# Patient Record
Sex: MALE | Race: BLACK OR AFRICAN AMERICAN | Employment: UNEMPLOYED | URBAN - METROPOLITAN AREA
[De-identification: names, ages, dates, MRNs, and addresses within clinical notes are randomized per-mention and may not be internally consistent; named-entity substitution may affect disease eponyms.]

---

## 2017-01-27 ENCOUNTER — OFFICE VISIT (OUTPATIENT)
Dept: FAMILY MEDICINE CLINIC | Age: 67
End: 2017-01-27

## 2017-01-27 VITALS
BODY MASS INDEX: 31.82 KG/M2 | DIASTOLIC BLOOD PRESSURE: 95 MMHG | HEART RATE: 72 BPM | TEMPERATURE: 97.1 F | SYSTOLIC BLOOD PRESSURE: 157 MMHG | WEIGHT: 191 LBS | RESPIRATION RATE: 18 BRPM | OXYGEN SATURATION: 97 % | HEIGHT: 65 IN

## 2017-01-27 DIAGNOSIS — R04.0 EPISTAXIS: ICD-10-CM

## 2017-01-27 DIAGNOSIS — R39.9 LOWER URINARY TRACT SYMPTOMS (LUTS): ICD-10-CM

## 2017-01-27 DIAGNOSIS — Z11.59 NEED FOR HEPATITIS C SCREENING TEST: ICD-10-CM

## 2017-01-27 DIAGNOSIS — R51.9 HEADACHE, UNSPECIFIED HEADACHE TYPE: ICD-10-CM

## 2017-01-27 DIAGNOSIS — F39 MOOD DISORDER (HCC): ICD-10-CM

## 2017-01-27 DIAGNOSIS — I10 ESSENTIAL HYPERTENSION: Primary | ICD-10-CM

## 2017-01-27 DIAGNOSIS — Z12.5 SCREENING FOR PROSTATE CANCER: ICD-10-CM

## 2017-01-27 RX ORDER — SERTRALINE HYDROCHLORIDE 50 MG/1
75 TABLET, FILM COATED ORAL DAILY
Qty: 135 TAB | Refills: 3 | Status: SHIPPED | OUTPATIENT
Start: 2017-01-27 | End: 2017-02-20 | Stop reason: SDUPTHER

## 2017-01-27 RX ORDER — SERTRALINE HYDROCHLORIDE 50 MG/1
TABLET, FILM COATED ORAL
Refills: 5 | COMMUNITY
Start: 2016-12-23 | End: 2017-01-27 | Stop reason: SDUPTHER

## 2017-01-27 RX ORDER — LOSARTAN POTASSIUM 50 MG/1
TABLET ORAL
Refills: 1 | COMMUNITY
Start: 2016-12-20 | End: 2017-01-27

## 2017-01-27 RX ORDER — PNEUMOCOCCAL 13-VALENT CONJUGATE VACCINE 2.2; 2.2; 2.2; 2.2; 2.2; 4.4; 2.2; 2.2; 2.2; 2.2; 2.2; 2.2; 2.2 UG/.5ML; UG/.5ML; UG/.5ML; UG/.5ML; UG/.5ML; UG/.5ML; UG/.5ML; UG/.5ML; UG/.5ML; UG/.5ML; UG/.5ML; UG/.5ML; UG/.5ML
INJECTION, SUSPENSION INTRAMUSCULAR
Refills: 0 | COMMUNITY
Start: 2017-01-09 | End: 2017-06-09

## 2017-01-27 RX ORDER — LOSARTAN POTASSIUM 100 MG/1
100 TABLET ORAL DAILY
Qty: 90 TAB | Refills: 1 | Status: SHIPPED | OUTPATIENT
Start: 2017-01-27 | End: 2017-08-30 | Stop reason: SDUPTHER

## 2017-01-27 RX ORDER — BUTALBITAL, ACETAMINOPHEN, CAFFEINE AND CODEINE PHOSPHATE 50; 325; 40; 30 MG/1; MG/1; MG/1; MG/1
1 CAPSULE ORAL
Qty: 30 CAP | Refills: 0 | Status: SHIPPED | OUTPATIENT
Start: 2017-01-27 | End: 2017-02-20

## 2017-01-27 RX ORDER — AMLODIPINE BESYLATE 10 MG/1
TABLET ORAL
Refills: 2 | COMMUNITY
Start: 2016-12-20 | End: 2017-10-25 | Stop reason: SDUPTHER

## 2017-01-27 NOTE — PATIENT INSTRUCTIONS
DASH Diet: Care Instructions  Your Care Instructions  The DASH diet is an eating plan that can help lower your blood pressure. DASH stands for Dietary Approaches to Stop Hypertension. Hypertension is high blood pressure. The DASH diet focuses on eating foods that are high in calcium, potassium, and magnesium. These nutrients can lower blood pressure. The foods that are highest in these nutrients are fruits, vegetables, low-fat dairy products, nuts, seeds, and legumes. But taking calcium, potassium, and magnesium supplements instead of eating foods that are high in those nutrients does not have the same effect. The DASH diet also includes whole grains, fish, and poultry. The DASH diet is one of several lifestyle changes your doctor may recommend to lower your high blood pressure. Your doctor may also want you to decrease the amount of sodium in your diet. Lowering sodium while following the DASH diet can lower blood pressure even further than just the DASH diet alone. Follow-up care is a key part of your treatment and safety. Be sure to make and go to all appointments, and call your doctor if you are having problems. It's also a good idea to know your test results and keep a list of the medicines you take. How can you care for yourself at home? Following the DASH diet  · Eat 4 to 5 servings of fruit each day. A serving is 1 medium-sized piece of fruit, ½ cup chopped or canned fruit, 1/4 cup dried fruit, or 4 ounces (½ cup) of fruit juice. Choose fruit more often than fruit juice. · Eat 4 to 5 servings of vegetables each day. A serving is 1 cup of lettuce or raw leafy vegetables, ½ cup of chopped or cooked vegetables, or 4 ounces (½ cup) of vegetable juice. Choose vegetables more often than vegetable juice. · Get 2 to 3 servings of low-fat and fat-free dairy each day. A serving is 8 ounces of milk, 1 cup of yogurt, or 1 ½ ounces of cheese. · Eat 6 to 8 servings of grains each day.  A serving is 1 slice of bread, 1 ounce of dry cereal, or ½ cup of cooked rice, pasta, or cooked cereal. Try to choose whole-grain products as much as possible. · Limit lean meat, poultry, and fish to 2 servings each day. A serving is 3 ounces, about the size of a deck of cards. · Eat 4 to 5 servings of nuts, seeds, and legumes (cooked dried beans, lentils, and split peas) each week. A serving is 1/3 cup of nuts, 2 tablespoons of seeds, or ½ cup of cooked beans or peas. · Limit fats and oils to 2 to 3 servings each day. A serving is 1 teaspoon of vegetable oil or 2 tablespoons of salad dressing. · Limit sweets and added sugars to 5 servings or less a week. A serving is 1 tablespoon jelly or jam, ½ cup sorbet, or 1 cup of lemonade. · Eat less than 2,300 milligrams (mg) of sodium a day. If you limit your sodium to 1,500 mg a day, you can lower your blood pressure even more. Tips for success  · Start small. Do not try to make dramatic changes to your diet all at once. You might feel that you are missing out on your favorite foods and then be more likely to not follow the plan. Make small changes, and stick with them. Once those changes become habit, add a few more changes. · Try some of the following:  ¨ Make it a goal to eat a fruit or vegetable at every meal and at snacks. This will make it easy to get the recommended amount of fruits and vegetables each day. ¨ Try yogurt topped with fruit and nuts for a snack or healthy dessert. ¨ Add lettuce, tomato, cucumber, and onion to sandwiches. ¨ Combine a ready-made pizza crust with low-fat mozzarella cheese and lots of vegetable toppings. Try using tomatoes, squash, spinach, broccoli, carrots, cauliflower, and onions. ¨ Have a variety of cut-up vegetables with a low-fat dip as an appetizer instead of chips and dip. ¨ Sprinkle sunflower seeds or chopped almonds over salads. Or try adding chopped walnuts or almonds to cooked vegetables. ¨ Try some vegetarian meals using beans and peas. Add garbanzo or kidney beans to salads. Make burritos and tacos with mashed ambrocio beans or black beans. Where can you learn more? Go to http://lavonne-frances.info/. Enter F474 in the search box to learn more about \"DASH Diet: Care Instructions. \"  Current as of: March 23, 2016  Content Version: 11.1  © 3856-2704 [employer]. Care instructions adapted under license by [employer] (which disclaims liability or warranty for this information). If you have questions about a medical condition or this instruction, always ask your healthcare professional. [place] [address] any warranty or liability for your use of this information. Headache: Care Instructions  Your Care Instructions    Headaches have many possible causes. Most headaches aren't a sign of a more serious problem, and they will get better on their own. Home treatment may help you feel better faster. The doctor has checked you carefully, but problems can develop later. If you notice any problems or new symptoms, get medical treatment right away. Follow-up care is a key part of your treatment and safety. Be sure to make and go to all appointments, and call your doctor if you are having problems. It's also a good idea to know your test results and keep a list of the medicines you take. How can you care for yourself at home? · Do not drive if you have taken a prescription pain medicine. · Rest in a quiet, dark room until your headache is gone. Close your eyes and try to relax or go to sleep. Don't watch TV or read. · Put a cold, moist cloth or cold pack on the painful area for 10 to 20 minutes at a time. Put a thin cloth between the cold pack and your skin. · Use a warm, moist towel or a heating pad set on low to relax tight shoulder and neck muscles. · Have someone gently massage your neck and shoulders. · Take pain medicines exactly as directed.   ¨ If the doctor gave you a prescription medicine for pain, take it as prescribed. ¨ If you are not taking a prescription pain medicine, ask your doctor if you can take an over-the-counter medicine. · Be careful not to take pain medicine more often than the instructions allow, because you may get worse or more frequent headaches when the medicine wears off. · Do not ignore new symptoms that occur with a headache, such as a fever, weakness or numbness, vision changes, or confusion. These may be signs of a more serious problem. To prevent headaches  · Keep a headache diary so you can figure out what triggers your headaches. Avoiding triggers may help you prevent headaches. Record when each headache began, how long it lasted, and what the pain was like (throbbing, aching, stabbing, or dull). Write down any other symptoms you had with the headache, such as nausea, flashing lights or dark spots, or sensitivity to bright light or loud noise. Note if the headache occurred near your period. List anything that might have triggered the headache, such as certain foods (chocolate, cheese, wine) or odors, smoke, bright light, stress, or lack of sleep. · Find healthy ways to deal with stress. Headaches are most common during or right after stressful times. Take time to relax before and after you do something that has caused a headache in the past.  · Try to keep your muscles relaxed by keeping good posture. Check your jaw, face, neck, and shoulder muscles for tension, and try relaxing them. When sitting at a desk, change positions often, and stretch for 30 seconds each hour. · Get plenty of sleep and exercise. · Eat regularly and well. Long periods without food can trigger a headache. · Treat yourself to a massage. Some people find that regular massages are very helpful in relieving tension. · Limit caffeine by not drinking too much coffee, tea, or soda. But don't quit caffeine suddenly, because that can also give you headaches.   · Reduce eyestrain from computers by blinking frequently and looking away from the computer screen every so often. Make sure you have proper eyewear and that your monitor is set up properly, about an arm's length away. · Seek help if you have depression or anxiety. Your headaches may be linked to these conditions. Treatment can both prevent headaches and help with symptoms of anxiety or depression. When should you call for help? Call 911 anytime you think you may need emergency care. For example, call if:  · You have signs of a stroke. These may include:  ¨ Sudden numbness, paralysis, or weakness in your face, arm, or leg, especially on only one side of your body. ¨ Sudden vision changes. ¨ Sudden trouble speaking. ¨ Sudden confusion or trouble understanding simple statements. ¨ Sudden problems with walking or balance. ¨ A sudden, severe headache that is different from past headaches. Call your doctor now or seek immediate medical care if:  · You have a new or worse headache. · Your headache gets much worse. Where can you learn more? Go to http://lavonne-frances.info/. Enter M271 in the search box to learn more about \"Headache: Care Instructions. \"  Current as of: February 19, 2016  Content Version: 11.1  © 6384-0175 [employer]. Care instructions adapted under license by [employer] (which disclaims liability or warranty for this information). If you have questions about a medical condition or this instruction, always ask your healthcare professional. [place] [address] any warranty or liability for your use of this information.
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
## 2017-01-27 NOTE — MR AVS SNAPSHOT
Visit Information Date & Time Provider Department Dept. Phone Encounter #  
 1/27/2017  9:30 AM Garethed Raymon Quevedo MD [facility] [phone] [id number] Follow-up Instructions Return in about 2 weeks (around 2/10/2017), or if symptoms worsen or fail to improve, for htn, luts. Upcoming Health Maintenance Date Due Hepatitis C Screening 1950 DTaP/Tdap/Td series (1 - Tdap) 12/12/1970 FOBT Q 1 YEAR AGE 50-75 12/12/1999 ZOSTER VACCINE AGE 60> 12/12/2009 GLAUCOMA SCREENING Q2Y 12/12/2014 MEDICARE YEARLY EXAM 12/12/2014 Pneumococcal 65+ Low/Medium Risk (2 of 2 - PPSV23) 1/19/2018 Allergies as of 1/27/2017  Review Complete On: 1/27/2017 By: Migel Pruett MD  
 No Known Allergies Current Immunizations  Never Reviewed No immunizations on file. Not reviewed this visit You Were Diagnosed With   
  
 Codes Comments Essential hypertension    -  Primary ICD-10-CM: I10 
ICD-9-CM: 401.9 Lower urinary tract symptoms (LUTS)     ICD-10-CM: R39.9 ICD-9-CM: 788.99 Headache, unspecified headache type     ICD-10-CM: R51 ICD-9-CM: 784.0 Mood disorder ([facility] [address]     ICD-10-CM: F39 
ICD-9-CM: 296.90 Screening for prostate cancer     ICD-10-CM: Z12.5 ICD-9-CM: V76.44 Need for hepatitis C screening test     ICD-10-CM: Z11.59 
ICD-9-CM: V73.89 Vitals BP Pulse Temp Resp Height(growth percentile) Weight(growth percentile) (!) 157/95 (BP 1 Location: Left arm, BP Patient Position: Sitting) 72 97.1 °F (36.2 °C) (Oral) 18 5' 5\" (1.651 m) 191 lb (86.6 kg) SpO2 BMI Smoking Status 97% 31.78 kg/m2 Never Smoker BMI and BSA Data Body Mass Index Body Surface Area 31.78 kg/m 2 1.99 m 2 Preferred Pharmacy Pharmacy Name Phone [facility], [address] [place] [address] [phone] Your Updated Medication List  
  
   
 This list is accurate as of: 1/27/17 10:48 AM.  Always use your most recent med list. amLODIPine 10 mg tablet Commonly known as:  Tad Mendel TK 1 T PO D FOR BP  
  
 codeine-butalbital-acetaminophen-caffeine -83-30 mg per capsule Commonly known as:  FIORICET WITH CODEINE Take 1 Cap by mouth every six (6) hours as needed for Headache. Max Daily Amount: 4 Caps. FLUAD 5533-2592 (65 YR UP)(PF) Syrg injection Generic drug:  influenza vaccine 2016-17 (65 yr+)(PF)  
ADM 0.5ML IM UTD  
  
 losartan 100 mg tablet Commonly known as:  COZAAR Take 1 Tab by mouth daily. Indications: Hypertension PREVNAR 13 (PF) 0.5 mL Syrg injection Generic drug:  pneumococcal 13 jim conj dip ADM 0.5ML IM UTD  
  
 sertraline 50 mg tablet Commonly known as:  ZOLOFT Take 1.5 Tabs by mouth daily. Prescriptions Printed Refills  
 codeine-butalbital-acetaminophen-caffeine (FIORICET WITH CODEINE) -12-30 mg per capsule 0 Sig: Take 1 Cap by mouth every six (6) hours as needed for Headache. Max Daily Amount: 4 Caps. Class: Print Route: Oral  
  
Prescriptions Sent to Pharmacy Refills  
 sertraline (ZOLOFT) 50 mg tablet 3 Sig: Take 1.5 Tabs by mouth daily. Class: Normal  
 Pharmacy: [address] Ph #: [phone] Route: Oral  
 losartan (COZAAR) 100 mg tablet 1 Sig: Take 1 Tab by mouth daily. Indications: Hypertension Class: Normal  
 Pharmacy: [address] Ph #: [phone] Route: Oral  
  
Follow-up Instructions Return in about 2 weeks (around 2/10/2017), or if symptoms worsen or fail to improve, for htn, luts. To-Do List   
 01/27/2017 Lab:  CBC WITH AUTOMATED DIFF   
  
 01/27/2017 Lab:  HEPATITIS C AB   
  
 01/27/2017 Lab:  LIPID PANEL   
  
 01/27/2017 Lab: METABOLIC PANEL, COMPREHENSIVE   
  
 01/27/2017 Lab:  PSA SCREENING (SCREENING) Patient Instructions DASH Diet: Care Instructions Your Care Instructions The DASH diet is an eating plan that can help lower your blood pressure. DASH stands for Dietary Approaches to Stop Hypertension. Hypertension is high blood pressure. The DASH diet focuses on eating foods that are high in calcium, potassium, and magnesium. These nutrients can lower blood pressure. The foods that are highest in these nutrients are fruits, vegetables, low-fat dairy products, nuts, seeds, and legumes. But taking calcium, potassium, and magnesium supplements instead of eating foods that are high in those nutrients does not have the same effect. The DASH diet also includes whole grains, fish, and poultry. The DASH diet is one of several lifestyle changes your doctor may recommend to lower your high blood pressure. Your doctor may also want you to decrease the amount of sodium in your diet. Lowering sodium while following the DASH diet can lower blood pressure even further than just the DASH diet alone. Follow-up care is a key part of your treatment and safety. Be sure to make and go to all appointments, and call your doctor if you are having problems. It's also a good idea to know your test results and keep a list of the medicines you take. How can you care for yourself at home? Following the DASH diet · Eat 4 to 5 servings of fruit each day. A serving is 1 medium-sized piece of fruit, ½ cup chopped or canned fruit, 1/4 cup dried fruit, or 4 ounces (½ cup) of fruit juice. Choose fruit more often than fruit juice. · Eat 4 to 5 servings of vegetables each day. A serving is 1 cup of lettuce or raw leafy vegetables, ½ cup of chopped or cooked vegetables, or 4 ounces (½ cup) of vegetable juice. Choose vegetables more often than vegetable juice. · Get 2 to 3 servings of low-fat and fat-free dairy each day.  A serving is 8 ounces of milk, 1 cup of yogurt, or 1 ½ ounces of cheese. · Eat 6 to 8 servings of grains each day. A serving is 1 slice of bread, 1 ounce of dry cereal, or ½ cup of cooked rice, pasta, or cooked cereal. Try to choose whole-grain products as much as possible. · Limit lean meat, poultry, and fish to 2 servings each day. A serving is 3 ounces, about the size of a deck of cards. · Eat 4 to 5 servings of nuts, seeds, and legumes (cooked dried beans, lentils, and split peas) each week. A serving is 1/3 cup of nuts, 2 tablespoons of seeds, or ½ cup of cooked beans or peas. · Limit fats and oils to 2 to 3 servings each day. A serving is 1 teaspoon of vegetable oil or 2 tablespoons of salad dressing. · Limit sweets and added sugars to 5 servings or less a week. A serving is 1 tablespoon jelly or jam, ½ cup sorbet, or 1 cup of lemonade. · Eat less than 2,300 milligrams (mg) of sodium a day. If you limit your sodium to 1,500 mg a day, you can lower your blood pressure even more. Tips for success · Start small. Do not try to make dramatic changes to your diet all at once. You might feel that you are missing out on your favorite foods and then be more likely to not follow the plan. Make small changes, and stick with them. Once those changes become habit, add a few more changes. · Try some of the following: ¨ Make it a goal to eat a fruit or vegetable at every meal and at snacks. This will make it easy to get the recommended amount of fruits and vegetables each day. ¨ Try yogurt topped with fruit and nuts for a snack or healthy dessert. ¨ Add lettuce, tomato, cucumber, and onion to sandwiches. ¨ Combine a ready-made pizza crust with low-fat mozzarella cheese and lots of vegetable toppings. Try using tomatoes, squash, spinach, broccoli, carrots, cauliflower, and onions. ¨ Have a variety of cut-up vegetables with a low-fat dip as an appetizer instead of chips and dip. ¨ Sprinkle sunflower seeds or chopped almonds over salads. Or try adding chopped walnuts or almonds to cooked vegetables. ¨ Try some vegetarian meals using beans and peas. Add garbanzo or kidney beans to salads. Make burritos and tacos with mashed ambrocio beans or black beans. Where can you learn more? Go to http://lavonne-frances.info/. Enter S827 in the search box to learn more about \"DASH Diet: Care Instructions. \" Current as of: March 23, 2016 Content Version: 11.1 © 7119-8232 [employer]. Care instructions adapted under license by [employer] (which disclaims liability or warranty for this information). If you have questions about a medical condition or this instruction, always ask your healthcare professional. [address] any warranty or liability for your use of this information. Headache: Care Instructions Your Care Instructions Headaches have many possible causes. Most headaches aren't a sign of a more serious problem, and they will get better on their own. Home treatment may help you feel better faster. The doctor has checked you carefully, but problems can develop later. If you notice any problems or new symptoms, get medical treatment right away. Follow-up care is a key part of your treatment and safety. Be sure to make and go to all appointments, and call your doctor if you are having problems. It's also a good idea to know your test results and keep a list of the medicines you take. How can you care for yourself at home? · Do not drive if you have taken a prescription pain medicine. · Rest in a quiet, dark room until your headache is gone. Close your eyes and try to relax or go to sleep. Don't watch TV or read. · Put a cold, moist cloth or cold pack on the painful area for 10 to 20 minutes at a time. Put a thin cloth between the cold pack and your skin. · Use a warm, moist towel or a heating pad set on low to relax tight shoulder and neck muscles. · Have someone gently massage your neck and shoulders. · Take pain medicines exactly as directed. ¨ If the doctor gave you a prescription medicine for pain, take it as prescribed. ¨ If you are not taking a prescription pain medicine, ask your doctor if you can take an over-the-counter medicine. · Be careful not to take pain medicine more often than the instructions allow, because you may get worse or more frequent headaches when the medicine wears off. · Do not ignore new symptoms that occur with a headache, such as a fever, weakness or numbness, vision changes, or confusion. These may be signs of a more serious problem. To prevent headaches · Keep a headache diary so you can figure out what triggers your headaches. Avoiding triggers may help you prevent headaches. Record when each headache began, how long it lasted, and what the pain was like (throbbing, aching, stabbing, or dull). Write down any other symptoms you had with the headache, such as nausea, flashing lights or dark spots, or sensitivity to bright light or loud noise. Note if the headache occurred near your period. List anything that might have triggered the headache, such as certain foods (chocolate, cheese, wine) or odors, smoke, bright light, stress, or lack of sleep. · Find healthy ways to deal with stress. Headaches are most common during or right after stressful times. Take time to relax before and after you do something that has caused a headache in the past. 
· Try to keep your muscles relaxed by keeping good posture. Check your jaw, face, neck, and shoulder muscles for tension, and try relaxing them. When sitting at a desk, change positions often, and stretch for 30 seconds each hour. · Get plenty of sleep and exercise. · Eat regularly and well. Long periods without food can trigger a headache. · Treat yourself to a massage. Some people find that regular massages are very helpful in relieving tension. · Limit caffeine by not drinking too much coffee, tea, or soda. But don't quit caffeine suddenly, because that can also give you headaches. · Reduce eyestrain from computers by blinking frequently and looking away from the computer screen every so often. Make sure you have proper eyewear and that your monitor is set up properly, about an arm's length away. · Seek help if you have depression or anxiety. Your headaches may be linked to these conditions. Treatment can both prevent headaches and help with symptoms of anxiety or depression. When should you call for help? Call 911 anytime you think you may need emergency care. For example, call if: 
· You have signs of a stroke. These may include: 
¨ Sudden numbness, paralysis, or weakness in your face, arm, or leg, especially on only one side of your body. ¨ Sudden vision changes. ¨ Sudden trouble speaking. ¨ Sudden confusion or trouble understanding simple statements. ¨ Sudden problems with walking or balance. ¨ A sudden, severe headache that is different from past headaches. Call your doctor now or seek immediate medical care if: 
· You have a new or worse headache. · Your headache gets much worse. Where can you learn more? Go to http://lavonne-frances.info/. Enter M271 in the search box to learn more about \"Headache: Care Instructions. \" Current as of: February 19, 2016 Content Version: 11.1 © 0325-3795 [employer]. Care instructions adapted under license by [employer] (which disclaims liability or warranty for this information). If you have questions about a medical condition or this instruction, always ask your healthcare professional. [place] [address] any warranty or liability for your use of this information. Introducing [facility]! [facility] introduces [employer] patient portal. Now you can access parts of your medical record, email your doctor's office, and request medication refills online. 1. In your internet browser, go to [email or web address] 2. Click on the First Time User? Click Here link in the Sign In box. You will see the New Member Sign Up page. 3. Enter your [employer] Access Code exactly as it appears below. You will not need to use this code after youve completed the sign-up process. If you do not sign up before the expiration date, you must request a new code. · [employer] Access Code: [email or web address] Expires: 4/27/2017  9:49 AM 
 
4. Enter the last four digits of your Social Security Number (xxxx) and Date of Birth (mm/dd/yyyy) as indicated and click Submit. You will be taken to the next sign-up page. 5. Create a [employer] ID. This will be your [employer] login ID and cannot be changed, so think of one that is secure and easy to remember. 6. Create a [employer] password. You can change your password at any time. 7. Enter your Password Reset Question and Answer. This can be used at a later time if you forget your password. 8. Enter your e-mail address. You will receive e-mail notification when new information is available in [address] 9. Click Sign Up. You can now view and download portions of your medical record. 10. Click the Download Summary menu link to download a portable copy of your medical information. If you have questions, please visit the Frequently Asked Questions section of the [employer] website. Remember, [employer] is NOT to be used for urgent needs. For medical emergencies, dial 911. Now available from your iPhone and Android! Please provide this summary of care documentation to your next provider. Your primary care clinician is listed as Cami Mcgee. If you have any questions after today's visit, please call [phone].

## 2017-01-28 LAB
ALBUMIN SERPL-MCNC: 4.4 G/DL (ref 3.6–4.8)
ALBUMIN/GLOB SERPL: 1.4 {RATIO} (ref 1.1–2.5)
ALP SERPL-CCNC: 102 IU/L (ref 39–117)
ALT SERPL-CCNC: 32 IU/L (ref 0–44)
AST SERPL-CCNC: 25 IU/L (ref 0–40)
BASOPHILS # BLD AUTO: 0 X10E3/UL (ref 0–0.2)
BASOPHILS NFR BLD AUTO: 0 %
BILIRUB SERPL-MCNC: 0.4 MG/DL (ref 0–1.2)
BUN SERPL-MCNC: 9 MG/DL (ref 8–27)
BUN/CREAT SERPL: 8 (ref 10–22)
CALCIUM SERPL-MCNC: 9.6 MG/DL (ref 8.6–10.2)
CHLORIDE SERPL-SCNC: 100 MMOL/L (ref 96–106)
CHOLEST SERPL-MCNC: 217 MG/DL (ref 100–199)
CO2 SERPL-SCNC: 27 MMOL/L (ref 18–29)
CREAT SERPL-MCNC: 1.11 MG/DL (ref 0.76–1.27)
EOSINOPHIL # BLD AUTO: 0.3 X10E3/UL (ref 0–0.4)
EOSINOPHIL NFR BLD AUTO: 8 %
ERYTHROCYTE [DISTWIDTH] IN BLOOD BY AUTOMATED COUNT: 15 % (ref 12.3–15.4)
GLOBULIN SER CALC-MCNC: 3.2 G/DL (ref 1.5–4.5)
GLUCOSE SERPL-MCNC: 104 MG/DL (ref 65–99)
HCT VFR BLD AUTO: 43.6 % (ref 37.5–51)
HCV AB S/CO SERPL IA: <0.1 S/CO RATIO (ref 0–0.9)
HDLC SERPL-MCNC: 54 MG/DL
HGB BLD-MCNC: 14.6 G/DL (ref 12.6–17.7)
IMM GRANULOCYTES # BLD: 0 X10E3/UL (ref 0–0.1)
IMM GRANULOCYTES NFR BLD: 0 %
INTERPRETATION, 910389: NORMAL
LDLC SERPL CALC-MCNC: 137 MG/DL (ref 0–99)
LYMPHOCYTES # BLD AUTO: 1.6 X10E3/UL (ref 0.7–3.1)
LYMPHOCYTES NFR BLD AUTO: 42 %
MCH RBC QN AUTO: 29.7 PG (ref 26.6–33)
MCHC RBC AUTO-ENTMCNC: 33.5 G/DL (ref 31.5–35.7)
MCV RBC AUTO: 89 FL (ref 79–97)
MONOCYTES # BLD AUTO: 0.4 X10E3/UL (ref 0.1–0.9)
MONOCYTES NFR BLD AUTO: 11 %
NEUTROPHILS # BLD AUTO: 1.5 X10E3/UL (ref 1.4–7)
NEUTROPHILS NFR BLD AUTO: 39 %
PLATELET # BLD AUTO: 218 X10E3/UL (ref 150–379)
POTASSIUM SERPL-SCNC: 4.1 MMOL/L (ref 3.5–5.2)
PROT SERPL-MCNC: 7.6 G/DL (ref 6–8.5)
PSA SERPL-MCNC: 1.2 NG/ML (ref 0–4)
RBC # BLD AUTO: 4.92 X10E6/UL (ref 4.14–5.8)
SODIUM SERPL-SCNC: 142 MMOL/L (ref 134–144)
TRIGL SERPL-MCNC: 128 MG/DL (ref 0–149)
VLDLC SERPL CALC-MCNC: 26 MG/DL (ref 5–40)
WBC # BLD AUTO: 3.9 X10E3/UL (ref 3.4–10.8)

## 2017-02-01 DIAGNOSIS — E78.5 DYSLIPIDEMIA: Primary | ICD-10-CM

## 2017-02-01 RX ORDER — ATORVASTATIN CALCIUM 40 MG/1
40 TABLET, FILM COATED ORAL DAILY
Qty: 30 TAB | Refills: 2 | Status: SHIPPED | OUTPATIENT
Start: 2017-02-01 | End: 2017-02-01 | Stop reason: SDUPTHER

## 2017-02-01 NOTE — PROGRESS NOTES
Please let patient know his cholesterol level is elevated. I will send in a cholesterol lowering medication to take once a day. He should also eat diet low in poly saturated fats and exercise. He should get his head CT scan done as discussed and f/u in clinic as scheduled.   Pipe Daniels MD

## 2017-02-03 NOTE — PROGRESS NOTES
Informed patient his cholesterol level is elevated. Dr. Florecita Lambert will send in a cholesterol lowering medication to take once a day. He should also eat a diet low in poly saturated fats and exercise. He should get his head CT scan done as discussed and follow up in clinica as scheduled. Patient verbalized understanding.

## 2017-02-20 ENCOUNTER — OFFICE VISIT (OUTPATIENT)
Dept: FAMILY MEDICINE CLINIC | Age: 67
End: 2017-02-20

## 2017-02-20 VITALS
SYSTOLIC BLOOD PRESSURE: 154 MMHG | BODY MASS INDEX: 31.06 KG/M2 | WEIGHT: 186.4 LBS | HEIGHT: 65 IN | DIASTOLIC BLOOD PRESSURE: 94 MMHG | HEART RATE: 69 BPM | RESPIRATION RATE: 18 BRPM | OXYGEN SATURATION: 98 % | TEMPERATURE: 96.7 F

## 2017-02-20 DIAGNOSIS — R51.9 HEADACHE, UNSPECIFIED HEADACHE TYPE: ICD-10-CM

## 2017-02-20 DIAGNOSIS — Z13.39 SCREENING FOR ALCOHOLISM: ICD-10-CM

## 2017-02-20 DIAGNOSIS — Z00.00 ROUTINE GENERAL MEDICAL EXAMINATION AT A HEALTH CARE FACILITY: ICD-10-CM

## 2017-02-20 DIAGNOSIS — B35.6 TINEA CRURIS: ICD-10-CM

## 2017-02-20 DIAGNOSIS — Z71.89 ACP (ADVANCE CARE PLANNING): ICD-10-CM

## 2017-02-20 DIAGNOSIS — I10 ESSENTIAL HYPERTENSION: ICD-10-CM

## 2017-02-20 DIAGNOSIS — Z13.5 SCREENING FOR GLAUCOMA: ICD-10-CM

## 2017-02-20 DIAGNOSIS — Z12.11 SCREEN FOR COLON CANCER: ICD-10-CM

## 2017-02-20 DIAGNOSIS — R39.9 LOWER URINARY TRACT SYMPTOMS (LUTS): ICD-10-CM

## 2017-02-20 DIAGNOSIS — R35.0 URINARY FREQUENCY: ICD-10-CM

## 2017-02-20 DIAGNOSIS — F39 MOOD DISORDER (HCC): Primary | ICD-10-CM

## 2017-02-20 DIAGNOSIS — R04.0 EPISTAXIS: ICD-10-CM

## 2017-02-20 DIAGNOSIS — H54.7 DECREASED VISUAL ACUITY: ICD-10-CM

## 2017-02-20 LAB
BILIRUB UR QL STRIP: NEGATIVE
GLUCOSE UR-MCNC: NEGATIVE MG/DL
KETONES P FAST UR STRIP-MCNC: NEGATIVE MG/DL
PH UR STRIP: 7 [PH] (ref 4.6–8)
PROT UR QL STRIP: NEGATIVE MG/DL
SP GR UR STRIP: 1.02 (ref 1–1.03)
UA UROBILINOGEN AMB POC: NORMAL (ref 0.2–1)
URINALYSIS CLARITY POC: CLEAR
URINALYSIS COLOR POC: NORMAL
URINE BLOOD POC: NEGATIVE
URINE LEUKOCYTES POC: NEGATIVE
URINE NITRITES POC: NEGATIVE

## 2017-02-20 RX ORDER — SERTRALINE HYDROCHLORIDE 50 MG/1
100 TABLET, FILM COATED ORAL DAILY
Qty: 180 TAB | Refills: 3 | Status: SHIPPED | OUTPATIENT
Start: 2017-02-20 | End: 2018-04-02 | Stop reason: SDUPTHER

## 2017-02-20 RX ORDER — HYDROCHLOROTHIAZIDE 25 MG/1
25 TABLET ORAL DAILY
Qty: 60 TAB | Refills: 1 | Status: SHIPPED | OUTPATIENT
Start: 2017-02-20 | End: 2017-02-20 | Stop reason: SDUPTHER

## 2017-02-20 RX ORDER — CLOTRIMAZOLE AND BETAMETHASONE DIPROPIONATE 10; .64 MG/G; MG/G
CREAM TOPICAL 2 TIMES DAILY
Qty: 45 G | Refills: 1 | Status: SHIPPED | OUTPATIENT
Start: 2017-02-20 | End: 2017-04-07

## 2017-02-20 RX ORDER — SUMATRIPTAN 50 MG/1
50 TABLET, FILM COATED ORAL
Qty: 20 TAB | Refills: 0 | Status: SHIPPED | OUTPATIENT
Start: 2017-02-20 | End: 2017-03-03 | Stop reason: SDUPTHER

## 2017-02-20 NOTE — PROGRESS NOTES
HPI  [employer]. comes in for f/u care. 1) HTN: BP still elevated. Discussed options. Will add HCTZ in addition to the amlodipine and losartan that he is taking. F/u in 2 weeks. 2) Headache: continues to have intractable headache x2 per week. Headache is right temporal and parietal areas. Slight visual disturbances and epistaxis noted with the episodes. taking fioricet. Initially this helped but now no longer doing much. Would like to try different medication. Will give imitrex. He is yet to do the head CT scan. Will get this done ASAP. I will also refer to neurology for evaluation. 3) Visual changes: these ongoing for a while. Feels some frontal sinus congestion and blurry vision both eyes. This has been ongoing for a long time. Will send to ophthalmology for evaluation. 4) Urinary urgency: patient has urgency of micturition. He denies dysuria or frequency. UA is stable. He also has some hesitancy. ? LUTS or OAB. Did PSA test few weeks ago that was normal. May need to see urologist.  5) Rash: patient has generalized itchy rash in the groin area. Also has rash on the arms and back. Would like some medication for this. Will give lotrisone cream to apply. 6) Epistaxis: comes with headache, lasts for about 5 minutes. He has had 2 episodes of this. Will refer to ENT for evaluation. 7) Mood d/o: continues to have anger though now less especially in the mornings. Wife wonders about increase in medication dose. Will go up on the zoloft to 100 mg daily. May take 50 mg in the AM and 50 mg in the PM.  8) Dyslipidemia: on lipitor, continue with current treatment. Past Medical History  Past Medical History   Diagnosis Date    BPH (benign prostatic hyperplasia)     Depression     Hypertension     Irritability and anger        Surgical History  No past surgical history on file.      Medications  Current Outpatient Prescriptions   Medication Sig Dispense Refill    sertraline (ZOLOFT) 50 mg tablet Take 2 Tabs by mouth daily. 180 Tab 3    hydroCHLOROthiazide (HYDRODIURIL) 25 mg tablet Take 1 Tab by mouth daily. 60 Tab 1    clotrimazole-betamethasone (LOTRISONE) topical cream Apply  to affected area two (2) times a day. 45 g 1    SUMAtriptan (IMITREX) 50 mg tablet Take 1 Tab by mouth once as needed for Migraine for up to 1 dose. May take another 50 mg tab in 2 hrs if headache persists. Max 2 doses in 24 hrs. 20 Tab 0    atorvastatin (LIPITOR) 40 mg tablet TAKE 1 TABLET BY MOUTH DAILY 90 Tab 2    amLODIPine (NORVASC) 10 mg tablet TK 1 T PO D FOR BP  2    losartan (COZAAR) 100 mg tablet Take 1 Tab by mouth daily. Indications: Hypertension 90 Tab 1    FLUAD 0116-0625, 65 YR UP,,PF, syrg injection ADM 0.5ML IM UTD  0    PREVNAR 13, PF, 0.5 mL syrg injection ADM 0.5ML IM UTD  0       Allergies  No Known Allergies    Family History  Family History   Problem Relation Age of Onset    Diabetes Mother     Heart Disease Father     Diabetes Father        Social History  Social History     Social History    Marital status: SINGLE     Spouse name: N/A    Number of children: N/A    Years of education: N/A     Occupational History    Not on file.      Social History Main Topics    Smoking status: Never Smoker    Smokeless tobacco: Not on file    Alcohol use No    Drug use: Not on file    Sexual activity: Yes     Partners: Female     Other Topics Concern    Not on file     Social History Narrative       Review of Systems  Review of Systems - History obtained from spouse, chart review and the patient  General ROS: positive for  - fatigue and malaise  negative for - chills or fever  Psychological ROS: positive for - behavioral disorder, irritability and mood swings  Ophthalmic ROS: positive for - blurry vision and decreased vision  ENT ROS: positive for - epistaxis, headaches, nasal congestion and sinus pain  Respiratory ROS: no cough, shortness of breath, or wheezing  Cardiovascular ROS: no chest pain or dyspnea on exertion  Gastrointestinal ROS: no abdominal pain, change in bowel habits, or black or bloody stools  Genito-Urinary ROS: positive for - urinary frequency/urgency  negative for - dysuria or hematuria  Musculoskeletal ROS: negative  Neurological ROS: positive for - headaches, speech problems and visual changes  negative for - confusion, numbness/tingling or weakness  Dermatological ROS: positive for - pruritus and rash    Vital Signs  Visit Vitals    BP (!) 154/94    Pulse 69    Temp 96.7 °F (35.9 °C) (Temporal)    Resp 18    Ht 5' 5\" (1.651 m)    Wt 186 lb 6.4 oz (84.6 kg)    SpO2 98%    BMI 31.02 kg/m2         Physical Exam  Physical Examination: General appearance - oriented to person, place, and time, acyanotic, in no respiratory distress and anxious  Mental status - alert, oriented to person, place, and time, affect appropriate to mood  Eyes - sclera anicteric  Nose - normal and patent, no erythema, discharge or polyps and normal nontender sinuses  Mouth - mucous membranes moist, pharynx normal without lesions  Neck - supple, no significant adenopathy  Lymphatics - no palpable lymphadenopathy  Chest - no tachypnea, retractions or cyanosis  Heart - S1 and S2 normal  Back exam - limited range of motion  Neurological - motor and sensory grossly normal bilaterally, normal muscle tone, no tremors, strength 5/5  Musculoskeletal - no muscular tenderness noted  Extremities - intact peripheral pulses  Skin - tinea cruris rash in groin area    Diagnostics  Orders Placed This Encounter    REFERRAL TO NEUROLOGY     Referral Priority:   Routine     Referral Type:   Consultation     Referral Reason:   Specialty Services Required    REFERRAL TO ENT-OTOLARYNGOLOGY     Referral Priority:   Routine     Referral Type:   Consultation     Referral Reason:   Specialty Services Required    REFERRAL FOR COLONOSCOPY     Referral Priority:   Routine     Referral Type:   Consultation     Referral Reason:   Specialty Services Required    REFERRAL TO OPHTHALMOLOGY     Referral Priority:   Routine     Referral Type:   Consultation     Referral Reason:   Specialty Services Required    AMB POC URINALYSIS DIP STICK AUTO W/O MICRO    sertraline (ZOLOFT) 50 mg tablet     Sig: Take 2 Tabs by mouth daily. Dispense:  180 Tab     Refill:  3    hydroCHLOROthiazide (HYDRODIURIL) 25 mg tablet     Sig: Take 1 Tab by mouth daily. Dispense:  60 Tab     Refill:  1    clotrimazole-betamethasone (LOTRISONE) topical cream     Sig: Apply  to affected area two (2) times a day. Dispense:  45 g     Refill:  1    SUMAtriptan (IMITREX) 50 mg tablet     Sig: Take 1 Tab by mouth once as needed for Migraine for up to 1 dose. May take another 50 mg tab in 2 hrs if headache persists. Max 2 doses in 24 hrs. Dispense:  20 Tab     Refill:  0         Results  Results for orders placed or performed in visit on 02/20/17   AMB POC URINALYSIS DIP STICK AUTO W/O MICRO   Result Value Ref Range    Color (UA POC) Dark Yellow     Clarity (UA POC) Clear     Glucose (UA POC) Negative Negative    Bilirubin (UA POC) Negative Negative    Ketones (UA POC) Negative Negative    Specific gravity (UA POC) 1.020 1.001 - 1.035    Blood (UA POC) Negative Negative    pH (UA POC) 7.0 4.6 - 8.0    Protein (UA POC) Negative Negative mg/dL    Urobilinogen (UA POC) 1 mg/dL 0.2 - 1    Nitrites (UA POC) Negative Negative    Leukocyte esterase (UA POC) Negative Negative     ASSESSMENT and PLAN  1. Mood disorder (HCC)  - sertraline (ZOLOFT) 50 mg tablet; Take 2 Tabs by mouth daily. Dispense: 180 Tab; Refill: 3    2. Headache, unspecified headache type  - REFERRAL TO NEUROLOGY  - SUMAtriptan (IMITREX) 50 mg tablet; Take 1 Tab by mouth once as needed for Migraine for up to 1 dose. May take another 50 mg tab in 2 hrs if headache persists. Max 2 doses in 24 hrs. Dispense: 20 Tab; Refill: 0    3.  Decreased visual acuity  - REFERRAL TO OPHTHALMOLOGY    4. Epistaxis  - REFERRAL TO ENT-OTOLARYNGOLOGY    5. Essential hypertension  - hydroCHLOROthiazide (HYDRODIURIL) 25 mg tablet; Take 1 Tab by mouth daily. Dispense: 60 Tab; Refill: 1    6. Tinea cruris  - clotrimazole-betamethasone (LOTRISONE) topical cream; Apply  to affected area two (2) times a day. Dispense: 45 g; Refill: 1    7. Urinary frequency  - AMB POC URINALYSIS DIP STICK AUTO W/O MICRO    lab results and schedule of future lab studies reviewed with patient  reviewed diet, exercise and weight control  reviewed medications and side effects in detail    I have discussed the diagnosis with the patient and the intended plan of care as seen in the above orders. The patient has received an after-visit summary and questions were answered concerning future plans. I have discussed medication, side effects, and warnings with the patient in detail. The patient verbalized understanding and is in agreement with the plan of care. The patient will contact the office with any additional concerns.     Migel Pruett MD

## 2017-02-20 NOTE — MR AVS SNAPSHOT
Visit Information Date & Time Provider Department Dept. Phone Encounter #  
 2/20/2017  3:45 PM Cami Pardo Ma, MD [facility] [phone] [id number] Follow-up Instructions Return in about 2 weeks (around 3/6/2017), or if symptoms worsen or fail to improve, for htn, rash, . Upcoming Health Maintenance Date Due DTaP/Tdap/Td series (1 - Tdap) 12/12/1970 FOBT Q 1 YEAR AGE 50-75 12/12/1999 ZOSTER VACCINE AGE 60> 12/12/2009 GLAUCOMA SCREENING Q2Y 12/12/2014 MEDICARE YEARLY EXAM 12/12/2014 Pneumococcal 65+ Low/Medium Risk (2 of 2 - PPSV23) 1/19/2018 Allergies as of 2/20/2017  Review Complete On: 2/20/2017 By: Elena Chauhan MD  
 No Known Allergies Current Immunizations  Never Reviewed No immunizations on file. Not reviewed this visit You Were Diagnosed With   
  
 Codes Comments Headache, unspecified headache type    -  Primary ICD-10-CM: R51 ICD-9-CM: 784.0 Routine general medical examination at a health care facility     ICD-10-CM: Z00.00 ICD-9-CM: V70.0 Screening for alcoholism     ICD-10-CM: Z13.89 ICD-9-CM: V79.1 Mood disorder ([address]     ICD-10-CM: F39 
ICD-9-CM: 296.90 Screening for glaucoma     ICD-10-CM: Z13.5 ICD-9-CM: V80.1 Decreased visual acuity     ICD-10-CM: H54.7 ICD-9-CM: 369.9 Screen for colon cancer     ICD-10-CM: Z12.11 ICD-9-CM: V76.51 Epistaxis     ICD-10-CM: R04.0 ICD-9-CM: 784.7 Essential hypertension     ICD-10-CM: I10 
ICD-9-CM: 401.9 Tinea cruris     ICD-10-CM: B35.6 ICD-9-CM: 110.3 Vitals BP Pulse Temp Resp Height(growth percentile) Weight(growth percentile) (!) 154/94 69 96.7 °F (35.9 °C) (Temporal) 18 5' 5\" (1.651 m) 186 lb 6.4 oz (84.6 kg) SpO2 BMI Smoking Status 98% 31.02 kg/m2 Never Smoker Vitals History BMI and BSA Data Body Mass Index Body Surface Area  31.02 kg/m 2 1.97 m 2  
  
  
 Preferred Pharmacy Pharmacy Name Phone [facility], [address] [place] [address] [phone] Your Updated Medication List  
  
   
This list is accurate as of: 2/20/17  4:19 PM.  Always use your most recent med list. amLODIPine 10 mg tablet Commonly known as:  Kayla Kofi TK 1 T PO D FOR BP  
  
 atorvastatin 40 mg tablet Commonly known as:  LIPITOR  
TAKE 1 TABLET BY MOUTH DAILY  
  
 clotrimazole-betamethasone topical cream  
Commonly known as:  Josr Calender Apply  to affected area two (2) times a day. codeine-butalbital-acetaminophen-caffeine -76-30 mg per capsule Commonly known as:  FIORICET WITH CODEINE Take 1 Cap by mouth every six (6) hours as needed for Headache. Max Daily Amount: 4 Caps. FLUAD [id number] (65 YR UP)(PF) Syrg injection Generic drug:  influenza vaccine 2016-17 (65 yr+)(PF)  
ADM 0.5ML IM UTD  
  
 hydroCHLOROthiazide 25 mg tablet Commonly known as:  HYDRODIURIL Take 1 Tab by mouth daily. losartan 100 mg tablet Commonly known as:  COZAAR Take 1 Tab by mouth daily. Indications: Hypertension PREVNAR 13 (PF) 0.5 mL Syrg injection Generic drug:  pneumococcal 13 jim conj dip ADM 0.5ML IM UTD  
  
 sertraline 50 mg tablet Commonly known as:  ZOLOFT Take 2 Tabs by mouth daily. Prescriptions Sent to Pharmacy Refills  
 sertraline (ZOLOFT) 50 mg tablet 3 Sig: Take 2 Tabs by mouth daily. Class: Normal  
 Pharmacy: [facility] [address] Ph #: [phone] Route: Oral  
 hydroCHLOROthiazide (HYDRODIURIL) 25 mg tablet 1 Sig: Take 1 Tab by mouth daily. Class: Normal  
 Pharmacy: [facility] [address] Ph #: [phone]  Route: Oral  
 clotrimazole-betamethasone (LOTRISONE) topical cream 1  
 Sig: Apply  to affected area two (2) times a day. Class: Normal  
 Pharmacy: [facility] [address] [facility] #: [phone] Route: Topical  
  
We Performed the Following REFERRAL FOR COLONOSCOPY [[id number] Custom] Comments:  
 Please evaluate patient for colon cancer screening. REFERRAL TO ENT-OTOLARYNGOLOGY [QCV00 Custom] Comments:  
 Please evaluate patient for epistaxis. REFERRAL TO NEUROLOGY [GMZ09 Custom] Comments:  
 Please evaluate patient for intractable headache. REFERRAL TO OPHTHALMOLOGY [REF57 Custom] Comments:  
 Please evaluate patient for glaucoma screening, diminished vision. Follow-up Instructions Return in about 2 weeks (around 3/6/2017), or if symptoms worsen or fail to improve, for htn, rash, . Referral Information Referral ID Referred By Referred To  
  
 [id number] Roambi N Not Available Visits Status Start Date End Date 1 New Request 2/20/17 2/20/18 If your referral has a status of pending review or denied, additional information will be sent to support the outcome of this decision. Referral ID Referred By Referred To  
 [id number] Roambi N Not Available Visits Status Start Date End Date 1 New Request 2/20/17 2/20/18 If your referral has a status of pending review or denied, additional information will be sent to support the outcome of this decision. Referral ID Referred By Referred To  
 [id number] Roambi N Not Available Visits Status Start Date End Date 1 New Request 2/20/17 2/20/18 If your referral has a status of pending review or denied, additional information will be sent to support the outcome of this decision. Referral ID Referred By Referred To  
 [id number] Roambi N Not Available Visits Status Start Date End Date 1 New Request 2/20/17 2/20/18 If your referral has a status of pending review or denied, additional information will be sent to support the outcome of this decision. Patient Instructions Medicare Part B Preventive Services Limitations Recommendation Scheduled Bone Mass Measurement 
(age 72 & older, biennial) Requires diagnosis related to osteoporosis or estrogen deficiency. Biennial benefit unless patient has history of long-term glucocorticoid tx or baseline is needed because initial test was by other method n/a Cardiovascular Screening Blood Tests (every 5 years) Total cholesterol, HDL, Triglycerides Order as a panel if possible UTD 1/21/17 Colorectal Cancer Screening 
-Fecal occult blood test (annual) -Flexible sigmoidoscopy (5y) 
-Screening colonoscopy (10y) -Barium Enema  Due    
Counseling to Prevent Tobacco Use (up to 8 sessions per year) - Counseling greater than 3 and up to 10 minutes - Counseling greater than 10 minutes Patients must be asymptomatic of tobacco-related conditions to receive as preventive service n/a Diabetes Screening Tests (at least every 3 years, Medicare covers annually or at 6-month intervals for prediabetic patients) Fasting blood sugar (FBS) or glucose tolerance test (GTT) Patient must be diagnosed with one of the following: 
-Hypertension, Dyslipidemia, obesity, previous impaired FBS or GTT 
Or any two of the following: overweight, FH of diabetes, age ? 72, history of gestational diabetes, birth of baby weighing more than 9 pounds n/a Diabetes Self-Management Training (DSMT) (no USPSTF recommendation) Requires referral by treating physician for patient with diabetes or renal disease. 10 hours of initial DSMT session of no less than 30 minutes each in a continuous 12-month period. 2 hours of follow-up DSMT in subsequent years. n/a Glaucoma Screening (no USPSTF recommendation) Diabetes mellitus, family history, , age 48 or over,  American, age 72 or over Due Human Immunodeficiency Virus (HIV) Screening (annually for increased risk patients) HIV-1 and HIV-2 by EIA, MERYL, rapid antibody test, or oral mucosa transudate Patient must be at increased risk for HIV infection per USPSTF guidelines or pregnant. Tests covered annually for patients at increased risk. Pregnant patients may receive up to 3 test during pregnancy. n/a Medical Nutrition Therapy (MNT) (for diabetes or renal disease not recommended schedule) Requires referral by treating physician for patient with diabetes or renal disease. Can be provided in same year as diabetes self-management training (DSMT), and CMS recommends medical nutrition therapy take place after DSMT. Up to 3 hours for initial year and 2 hours in subsequent years. n/a Prostate Cancer Screening (annually up to age 76) - Digital rectal exam (SHAKIRA) - Prostate specific antigen (PSA) Annually (age 48 or over), SHAKIRA not paid separately when covered E/M service is provided on same date Men up to age 76 may need a screening blood test for prostate cancer at certain intervals, depending on their personal and family history. This decision is between the patient and his provider. Due Seasonal Influenza Vaccination (annually)  UTD 1/19/17 Pneumococcal Vaccination (once after 65)  UTD 1/19/17 Hepatitis B Vaccinations (if medium/high risk) Medium/high risk factors:  End-stage renal disease, Hemophiliacs who received Factor VIII or IX concentrates, Clients of institutions for the mentally retarded, Persons who live in the same house as a HepB virus carrier, Homosexual men, Illicit injectable drug abusers. n/a Shingles Vaccination A shingles vaccine is also recommended once in a lifetime after age 61 Due Ultrasound Screening for Abdominal Aortic Aneurysm (AAA) (once) Patient must be referred through IPPE and not have had a screening for abdominal aortic aneurysm before under Medicare. Limited to patients who meet one of the following criteria: 
- Men who are 73-68 years old and have smoked more than 100 cigarettes in their lifetime. 
-Anyone with a FH of AAA 
-Anyone recommended for screening by USPSTF n/a Headache: Care Instructions Your Care Instructions Headaches have many possible causes. Most headaches aren't a sign of a more serious problem, and they will get better on their own. Home treatment may help you feel better faster. The doctor has checked you carefully, but problems can develop later. If you notice any problems or new symptoms, get medical treatment right away. Follow-up care is a key part of your treatment and safety. Be sure to make and go to all appointments, and call your doctor if you are having problems. It's also a good idea to know your test results and keep a list of the medicines you take. How can you care for yourself at home? · Do not drive if you have taken a prescription pain medicine. · Rest in a quiet, dark room until your headache is gone. Close your eyes and try to relax or go to sleep. Don't watch TV or read. · Put a cold, moist cloth or cold pack on the painful area for 10 to 20 minutes at a time. Put a thin cloth between the cold pack and your skin. · Use a warm, moist towel or a heating pad set on low to relax tight shoulder and neck muscles. · Have someone gently massage your neck and shoulders. · Take pain medicines exactly as directed. ¨ If the doctor gave you a prescription medicine for pain, take it as prescribed. ¨ If you are not taking a prescription pain medicine, ask your doctor if you can take an over-the-counter medicine. · Be careful not to take pain medicine more often than the instructions allow, because you may get worse or more frequent headaches when the medicine wears off.  
· Do not ignore new symptoms that occur with a headache, such as a fever, weakness or numbness, vision changes, or confusion. These may be signs of a more serious problem. To prevent headaches · Keep a headache diary so you can figure out what triggers your headaches. Avoiding triggers may help you prevent headaches. Record when each headache began, how long it lasted, and what the pain was like (throbbing, aching, stabbing, or dull). Write down any other symptoms you had with the headache, such as nausea, flashing lights or dark spots, or sensitivity to bright light or loud noise. Note if the headache occurred near your period. List anything that might have triggered the headache, such as certain foods (chocolate, cheese, wine) or odors, smoke, bright light, stress, or lack of sleep. · Find healthy ways to deal with stress. Headaches are most common during or right after stressful times. Take time to relax before and after you do something that has caused a headache in the past. 
· Try to keep your muscles relaxed by keeping good posture. Check your jaw, face, neck, and shoulder muscles for tension, and try relaxing them. When sitting at a desk, change positions often, and stretch for 30 seconds each hour. · Get plenty of sleep and exercise. · Eat regularly and well. Long periods without food can trigger a headache. · Treat yourself to a massage. Some people find that regular massages are very helpful in relieving tension. · Limit caffeine by not drinking too much coffee, tea, or soda. But don't quit caffeine suddenly, because that can also give you headaches. · Reduce eyestrain from computers by blinking frequently and looking away from the computer screen every so often. Make sure you have proper eyewear and that your monitor is set up properly, about an arm's length away. · Seek help if you have depression or anxiety. Your headaches may be linked to these conditions. Treatment can both prevent headaches and help with symptoms of anxiety or depression. When should you call for help? Call 911 anytime you think you may need emergency care. For example, call if: 
· You have signs of a stroke. These may include: 
¨ Sudden numbness, paralysis, or weakness in your face, arm, or leg, especially on only one side of your body. ¨ Sudden vision changes. ¨ Sudden trouble speaking. ¨ Sudden confusion or trouble understanding simple statements. ¨ Sudden problems with walking or balance. ¨ A sudden, severe headache that is different from past headaches. Call your doctor now or seek immediate medical care if: 
· You have a new or worse headache. · Your headache gets much worse. Where can you learn more? Go to http://lavonne-frances.info/. Enter M271 in the search box to learn more about \"Headache: Care Instructions. \" Current as of: February 19, 2016 Content Version: 11.1 © 4132-4686 [employer]. Care instructions adapted under license by [employer] (which disclaims liability or warranty for this information). If you have questions about a medical condition or this instruction, always ask your healthcare professional. [place] [address] any warranty or liability for your use of this information. High Blood Pressure: Care Instructions Your Care Instructions If your blood pressure is usually above 140/90, you have high blood pressure, or hypertension. That means the top number is 140 or higher or the bottom number is 90 or higher, or both. Despite what a lot of people think, high blood pressure usually doesn't cause headaches or make you feel dizzy or lightheaded. It usually has no symptoms. But it does increase your risk for heart attack, stroke, and kidney or eye damage. The higher your blood pressure, the more your risk increases. Your doctor will give you a goal for your blood pressure. Your goal will be based on your health and your age.  An example of a goal is to keep your blood pressure below 140/90. Lifestyle changes, such as eating healthy and being active, are always important to help lower blood pressure. You might also take medicine to reach your blood pressure goal. 
Follow-up care is a key part of your treatment and safety. Be sure to make and go to all appointments, and call your doctor if you are having problems. It's also a good idea to know your test results and keep a list of the medicines you take. How can you care for yourself at home? Medical treatment · If you stop taking your medicine, your blood pressure will go back up. You may take one or more types of medicine to lower your blood pressure. Be safe with medicines. Take your medicine exactly as prescribed. Call your doctor if you think you are having a problem with your medicine. · Talk to your doctor before you start taking aspirin every day. Aspirin can help certain people lower their risk of a heart attack or stroke. But taking aspirin isn't right for everyone, because it can cause serious bleeding. · See your doctor regularly. You may need to see the doctor more often at first or until your blood pressure comes down. · If you are taking blood pressure medicine, talk to your doctor before you take decongestants or anti-inflammatory medicine, such as ibuprofen. Some of these medicines can raise blood pressure. · Learn how to check your blood pressure at home. Lifestyle changes · Stay at a healthy weight. This is especially important if you put on weight around the waist. Losing even 10 pounds can help you lower your blood pressure. · If your doctor recommends it, get more exercise. Walking is a good choice. Bit by bit, increase the amount you walk every day. Try for at least 30 minutes on most days of the week. You also may want to swim, bike, or do other activities. · Avoid or limit alcohol. Talk to your doctor about whether you can drink any alcohol. · Try to limit how much sodium you eat to less than 2,300 milligrams (mg) a day. Your doctor may ask you to try to eat less than 1,500 mg a day. · Eat plenty of fruits (such as bananas and oranges), vegetables, legumes, whole grains, and low-fat dairy products. · Lower the amount of saturated fat in your diet. Saturated fat is found in animal products such as milk, cheese, and meat. Limiting these foods may help you lose weight and also lower your risk for heart disease. · Do not smoke. Smoking increases your risk for heart attack and stroke. If you need help quitting, talk to your doctor about stop-smoking programs and medicines. These can increase your chances of quitting for good. When should you call for help? Call 911 anytime you think you may need emergency care. This may mean having symptoms that suggest that your blood pressure is causing a serious heart or blood vessel problem. Your blood pressure may be over 180/110. For example, call 911 if: 
· You have symptoms of a heart attack. These may include: ¨ Chest pain or pressure, or a strange feeling in the chest. 
¨ Sweating. ¨ Shortness of breath. ¨ Nausea or vomiting. ¨ Pain, pressure, or a strange feeling in the back, neck, jaw, or upper belly or in one or both shoulders or arms. ¨ Lightheadedness or sudden weakness. ¨ A fast or irregular heartbeat. · You have symptoms of a stroke. These may include: 
¨ Sudden numbness, tingling, weakness, or loss of movement in your face, arm, or leg, especially on only one side of your body. ¨ Sudden vision changes. ¨ Sudden trouble speaking. ¨ Sudden confusion or trouble understanding simple statements. ¨ Sudden problems with walking or balance. ¨ A sudden, severe headache that is different from past headaches. · You have severe back or belly pain. Do not wait until your blood pressure comes down on its own. Get help right away. Call your doctor now or seek immediate care if: · Your blood pressure is much higher than normal (such as 180/110 or higher), but you don't have symptoms. · You think high blood pressure is causing symptoms, such as: ¨ Severe headache. ¨ Blurry vision. Watch closely for changes in your health, and be sure to contact your doctor if: 
· Your blood pressure measures 140/90 or higher at least 2 times. That means the top number is 140 or higher or the bottom number is 90 or higher, or both. · You think you may be having side effects from your blood pressure medicine. · Your blood pressure is usually normal, but it goes above normal at least 2 times. Where can you learn more? Go to http://lavonne-frances.info/. Enter U408 in the search box to learn more about \"High Blood Pressure: Care Instructions. \" Current as of: August 8, 2016 Content Version: 11.1 © 0366-2835 [employer]. Care instructions adapted under license by [employer] (which disclaims liability or warranty for this information). If you have questions about a medical condition or this instruction, always ask your healthcare professional. [address] any warranty or liability for your use of this information. Jock Itch: Care Instructions Your Care Instructions Jock itch is a fungal infection of the groin. The fungus that causes jock itch lives on your skin. It often affects male athletes, but anyone can get jock itch. You may get an itchy rash on your inner thighs and rear end (buttocks). It spreads and starts to itch when you sweat or are in steamy showers or locker rooms. Jock itch should end soon if you keep your skin dry after you clean it. You can treat jock itch at home with antifungal creams and powders that you can buy without a prescription. Follow-up care is a key part of your treatment and safety.  Be sure to make and go to all appointments, and call your doctor if you are having problems. Its also a good idea to know your test results and keep a list of the medicines you take. How can you care for yourself at home? · Wash the rash with soap and water. · Wet a soft cloth with cool water alone or mixed with baking soda or essential oils such as lavender or bari. Put the cool compress on the skin to relieve itching. · If you have large areas of blisters or sores, use compresses such as those made with Burow's solution (available without a prescription) to soothe and dry out the blisters. · Spread antifungal cream or powder over, and beyond the edge of, the rash. Follow the directions on the package. · If your doctor prescribed medicine, take it exactly as directed. Call your doctor if you have any problems with your medicine. · Try not to scratch the rash. · Shower or bathe daily and after you exercise. · Keep your skin dry as much as possible to allow it to heal. 
· Until your jock itch is cured, wear loose-fitting cotton clothing. Avoid tight underwear, pants, and panty hose. · Wash your supporters and shorts after every wearing. · Do not share clothing, sports equipment, towels, or sheets to avoid spreading the fungi to other people. To prevent jock itch · Put on socks before you put on underwear if you have athlete's foot. This action helps prevent the fungus on your feet from spreading to your groin. · Wash your workout clothes, underwear, socks, and towels after each use. · Keep your groin, inner thighs, and buttocks clean and dry, especially after you exercise and shower. · Use a powder on your groin, inner thighs, and buttocks to help keep these areas dry. · Do not borrow or lend clothing, sports equipment, towels, or sheets. · Wear slippers or sandals in locker rooms, showers, and public bathing areas. When should you call for help? Call your doctor now or seek immediate medical care if: 
· You have signs of infection, such as: ¨ Increased pain, swelling, warmth, or redness. ¨ Red streaks leading from the rash. ¨ Pus draining from the rash. ¨ A fever. Watch closely for changes in your health, and be sure to contact your doctor if: 
· You do not get better after 2 weeks of home care. · Your rash appears to spread even after treatment. Where can you learn more? Go to http://lavonne-frances.info/. Enter G303 in the search box to learn more about \"Jock Itch: Care Instructions. \" Current as of: February 5, 2016 Content Version: 11.1 © 4459-3271 [employer]. Care instructions adapted under license by [employer] (which disclaims liability or warranty for this information). If you have questions about a medical condition or this instruction, always ask your healthcare professional. [address] any warranty or liability for your use of this information. Introducing \[facility] [place] [facility]\"" & HEALTH SERVICES! [facility] introduces CloudArena patient portal. Now you can access parts of your medical record, email your doctor's office, and request medication refills online. 1. In your internet browser, go to [email or web address] 2. Click on the First Time User? Click Here link in the Sign In box. You will see the New Member Sign Up page. 3. Enter your CloudArena Access Code exactly as it appears below. You will not need to use this code after youve completed the sign-up process. If you do not sign up before the expiration date, you must request a new code. · CloudArena Access Code: [email or web address] Expires: 4/27/2017  9:49 AM 
 
4. Enter the last four digits of your Social Security Number (xxxx) and Date of Birth (mm/dd/yyyy) as indicated and click Submit. You will be taken to the next sign-up page. 5. Create a CloudArena ID. This will be your CloudArena login ID and cannot be changed, so think of one that is secure and easy to remember. 6. Create a [employer] password. You can change your password at any time. 7. Enter your Password Reset Question and Answer. This can be used at a later time if you forget your password. 8. Enter your e-mail address. You will receive e-mail notification when new information is available in [address] 9. Click Sign Up. You can now view and download portions of your medical record. 10. Click the Download Summary menu link to download a portable copy of your medical information. If you have questions, please visit the Frequently Asked Questions section of the [employer] website. Remember, [employer] is NOT to be used for urgent needs. For medical emergencies, dial 911. Now available from your iPhone and Android! Please provide this summary of care documentation to your next provider. Your primary care clinician is listed as Cami Mcgee. If you have any questions after today's visit, please call [phone].

## 2017-02-20 NOTE — PROGRESS NOTES
Chief Complaint   Patient presents with    Headache     Has been having bad headaches the past month    Epistaxis     Bled twice in the past couple weeks    [employer] Visit     1. Have you been to the ER, urgent care clinic since your last visit? Hospitalized since your last visit? No    2. Have you seen or consulted any other health care providers outside of the [facility] since your last visit? Include any pap smears or colon screening. No    This is an Initial Medicare Annual Wellness Exam (AWV) (Performed 12 months after IPPE or effective date of Medicare Part B enrollment, Once in a lifetime)    I have reviewed the patient's medical history in detail and updated the computerized patient record. History   [employer]. comes in for medicare wellness visit. Past Medical History   Diagnosis Date    BPH (benign prostatic hyperplasia)     Depression     Hypertension     Irritability and anger       No past surgical history on file. Current Outpatient Prescriptions   Medication Sig Dispense Refill    atorvastatin (LIPITOR) 40 mg tablet TAKE 1 TABLET BY MOUTH DAILY 90 Tab 2    amLODIPine (NORVASC) 10 mg tablet TK 1 T PO D FOR BP  2    sertraline (ZOLOFT) 50 mg tablet Take 1.5 Tabs by mouth daily. 135 Tab 3    losartan (COZAAR) 100 mg tablet Take 1 Tab by mouth daily. Indications: Hypertension 90 Tab 1    codeine-butalbital-acetaminophen-caffeine (FIORICET WITH CODEINE) -47-30 mg per capsule Take 1 Cap by mouth every six (6) hours as needed for Headache. Max Daily Amount: 4 Caps.  30 Cap 0    FLUAD 4435-3047, 65 YR UP,,PF, syrg injection ADM 0.5ML IM UTD  0    PREVNAR 13, PF, 0.5 mL syrg injection ADM 0.5ML IM UTD  0     No Known Allergies  Family History   Problem Relation Age of Onset    Diabetes Mother     Heart Disease Father     Diabetes Father      Social History   Substance Use Topics    Smoking status: Never Smoker    Smokeless tobacco: Not on file  Alcohol use No     There is no problem list on file for this patient. Depression Risk Factor Screening:     PHQ 2 / 9, over the last two weeks 2/20/2017   Little interest or pleasure in doing things Not at all   Feeling down, depressed or hopeless Not at all   Total Score PHQ 2 0     Alcohol Risk Factor Screening: On any occasion during the past 3 months, have you had more than 4 drinks containing alcohol? No    Do you average more than 14 drinks per week? No    Functional Ability and Level of Safety:     Hearing Loss   none    Activities of Daily Living   Self-care. Requires assistance with: no ADLs    Fall Risk     Fall Risk Assessment, last 12 mths 2/20/2017   Able to walk? Yes   Fall in past 12 months? No     Abuse Screen   Patient is not abused    Review of Systems   Pertinent items are noted in HPI. Physical Examination     No exam data present    Evaluation of Cognitive Function:  Mood/affect:  neutral  Appearance: age appropriate and casually dressed  Family member/caregiver input: 1    Visit Vitals    BP (!) 154/94    Pulse 69    Temp 96.7 °F (35.9 °C) (Temporal)    Resp 18    Ht 5' 5\" (1.651 m)    Wt 186 lb 6.4 oz (84.6 kg)    SpO2 98%    BMI 31.02 kg/m2     General appearance: alert, cooperative, appears stated age  Lungs: clear to auscultation bilaterally  Heart: S1, S2 normal  Pulses: 2+ and symmetric  Neurologic: Grossly normal    Patient Care Team:  Kong Thakur MD as PCP - General (Family Practice)    Advice/Referrals/Counseling   Education and counseling provided:  Colorectal cancer screening tests  Screening for glaucoma    Assessment/Plan     1. Routine general medical examination at a health care facility    2. Screening for alcoholism    3. Screening for glaucoma  - REFERRAL TO OPHTHALMOLOGY    4. Screen for colon cancer  - REFERRAL FOR COLONOSCOPY    5.  ACP (advance care planning)    reviewed diet, exercise and weight control  reviewed medications and side effects in detail. I have discussed the diagnosis with the patient and the intended plan of care as seen in the above orders. The patient has received an after-visit summary and questions were answered concerning future plans. I have discussed medication, side effects, and warnings with the patient in detail. The patient verbalized understanding and is in agreement with the plan of care. The patient will contact the office with any additional concerns. Personalized preventive plan of care was discussed, printed and given to patient.     Marjorie Berg MD

## 2017-02-20 NOTE — PATIENT INSTRUCTIONS
Medicare Part B Preventive Services Limitations Recommendation Scheduled   Bone Mass Measurement  (age 72 & older, biennial) Requires diagnosis related to osteoporosis or estrogen deficiency. Biennial benefit unless patient has history of long-term glucocorticoid tx or baseline is needed because initial test was by other method n/a    Cardiovascular Screening Blood Tests (every 5 years)  Total cholesterol, HDL, Triglycerides Order as a panel if possible UTD 1/21/17    Colorectal Cancer Screening  -Fecal occult blood test (annual)  -Flexible sigmoidoscopy (5y)  -Screening colonoscopy (10y)  -Barium Enema  Due     Counseling to Prevent Tobacco Use (up to 8 sessions per year)  - Counseling greater than 3 and up to 10 minutes  - Counseling greater than 10 minutes Patients must be asymptomatic of tobacco-related conditions to receive as preventive service n/a    Diabetes Screening Tests (at least every 3 years, Medicare covers annually or at 6-month intervals for prediabetic patients)    Fasting blood sugar (FBS) or glucose tolerance test (GTT) Patient must be diagnosed with one of the following:  -Hypertension, Dyslipidemia, obesity, previous impaired FBS or GTT  Or any two of the following: overweight, FH of diabetes, age ? 72, history of gestational diabetes, birth of baby weighing more than 9 pounds n/a    Diabetes Self-Management Training (DSMT) (no USPSTF recommendation) Requires referral by treating physician for patient with diabetes or renal disease. 10 hours of initial DSMT session of no less than 30 minutes each in a continuous 12-month period. 2 hours of follow-up DSMT in subsequent years.  n/a    Glaucoma Screening (no USPSTF recommendation) Diabetes mellitus, family history, , age 48 or over,  American, age 72 or over Due    Human Immunodeficiency Virus (HIV) Screening (annually for increased risk patients)  HIV-1 and HIV-2 by EIA, MERYL, rapid antibody test, or oral mucosa transudate Patient must be at increased risk for HIV infection per USPSTF guidelines or pregnant. Tests covered annually for patients at increased risk. Pregnant patients may receive up to 3 test during pregnancy. n/a    Medical Nutrition Therapy (MNT) (for diabetes or renal disease not recommended schedule) Requires referral by treating physician for patient with diabetes or renal disease. Can be provided in same year as diabetes self-management training (DSMT), and CMS recommends medical nutrition therapy take place after DSMT. Up to 3 hours for initial year and 2 hours in subsequent years. n/a    Prostate Cancer Screening (annually up to age 76)  - Digital rectal exam (SHAKIRA)  - Prostate specific antigen (PSA) Annually (age 48 or over), SHAKIRA not paid separately when covered E/M service is provided on same date  Men up to age 76 may need a screening blood test for prostate cancer at certain intervals, depending on their personal and family history. This decision is between the patient and his provider. Due    Seasonal Influenza Vaccination (annually)  UTD 1/19/17      Pneumococcal Vaccination (once after 72)  UTD 1/19/17    Hepatitis B Vaccinations (if medium/high risk) Medium/high risk factors:  End-stage renal disease,  Hemophiliacs who received Factor VIII or IX concentrates, Clients of institutions for the mentally retarded, Persons who live in the same house as a HepB virus carrier, Homosexual men, Illicit injectable drug abusers. n/a    Shingles Vaccination A shingles vaccine is also recommended once in a lifetime after age 61 Due    Ultrasound Screening for Abdominal Aortic Aneurysm (AAA) (once) Patient must be referred through [facility] and not have had a screening for abdominal aortic aneurysm before under Medicare.   Limited to patients who meet one of the following criteria:  - Men who are 73-68 years old and have smoked more than 100 cigarettes in their lifetime.  -Anyone with a FH of AAA  -Anyone recommended for screening by USPSTF n/a           Headache: Care Instructions  Your Care Instructions    Headaches have many possible causes. Most headaches aren't a sign of a more serious problem, and they will get better on their own. Home treatment may help you feel better faster. The doctor has checked you carefully, but problems can develop later. If you notice any problems or new symptoms, get medical treatment right away. Follow-up care is a key part of your treatment and safety. Be sure to make and go to all appointments, and call your doctor if you are having problems. It's also a good idea to know your test results and keep a list of the medicines you take. How can you care for yourself at home? · Do not drive if you have taken a prescription pain medicine. · Rest in a quiet, dark room until your headache is gone. Close your eyes and try to relax or go to sleep. Don't watch TV or read. · Put a cold, moist cloth or cold pack on the painful area for 10 to 20 minutes at a time. Put a thin cloth between the cold pack and your skin. · Use a warm, moist towel or a heating pad set on low to relax tight shoulder and neck muscles. · Have someone gently massage your neck and shoulders. · Take pain medicines exactly as directed. ¨ If the doctor gave you a prescription medicine for pain, take it as prescribed. ¨ If you are not taking a prescription pain medicine, ask your doctor if you can take an over-the-counter medicine. · Be careful not to take pain medicine more often than the instructions allow, because you may get worse or more frequent headaches when the medicine wears off. · Do not ignore new symptoms that occur with a headache, such as a fever, weakness or numbness, vision changes, or confusion. These may be signs of a more serious problem. To prevent headaches  · Keep a headache diary so you can figure out what triggers your headaches. Avoiding triggers may help you prevent headaches.  Record when each headache began, how long it lasted, and what the pain was like (throbbing, aching, stabbing, or dull). Write down any other symptoms you had with the headache, such as nausea, flashing lights or dark spots, or sensitivity to bright light or loud noise. Note if the headache occurred near your period. List anything that might have triggered the headache, such as certain foods (chocolate, cheese, wine) or odors, smoke, bright light, stress, or lack of sleep. · Find healthy ways to deal with stress. Headaches are most common during or right after stressful times. Take time to relax before and after you do something that has caused a headache in the past.  · Try to keep your muscles relaxed by keeping good posture. Check your jaw, face, neck, and shoulder muscles for tension, and try relaxing them. When sitting at a desk, change positions often, and stretch for 30 seconds each hour. · Get plenty of sleep and exercise. · Eat regularly and well. Long periods without food can trigger a headache. · Treat yourself to a massage. Some people find that regular massages are very helpful in relieving tension. · Limit caffeine by not drinking too much coffee, tea, or soda. But don't quit caffeine suddenly, because that can also give you headaches. · Reduce eyestrain from computers by blinking frequently and looking away from the computer screen every so often. Make sure you have proper eyewear and that your monitor is set up properly, about an arm's length away. · Seek help if you have depression or anxiety. Your headaches may be linked to these conditions. Treatment can both prevent headaches and help with symptoms of anxiety or depression. When should you call for help? Call 911 anytime you think you may need emergency care. For example, call if:  · You have signs of a stroke.  These may include:  ¨ Sudden numbness, paralysis, or weakness in your face, arm, or leg, especially on only one side of your body.  ¨ Sudden vision changes. ¨ Sudden trouble speaking. ¨ Sudden confusion or trouble understanding simple statements. ¨ Sudden problems with walking or balance. ¨ A sudden, severe headache that is different from past headaches. Call your doctor now or seek immediate medical care if:  · You have a new or worse headache. · Your headache gets much worse. Where can you learn more? Go to http://lavonne-frances.info/. Enter M271 in the search box to learn more about \"Headache: Care Instructions. \"  Current as of: February 19, 2016  Content Version: 11.1  © 6486-4706 [employer]. Care instructions adapted under license by [employer] (which disclaims liability or warranty for this information). If you have questions about a medical condition or this instruction, always ask your healthcare professional. [address] any warranty or liability for your use of this information. High Blood Pressure: Care Instructions  Your Care Instructions  If your blood pressure is usually above 140/90, you have high blood pressure, or hypertension. That means the top number is 140 or higher or the bottom number is 90 or higher, or both. Despite what a lot of people think, high blood pressure usually doesn't cause headaches or make you feel dizzy or lightheaded. It usually has no symptoms. But it does increase your risk for heart attack, stroke, and kidney or eye damage. The higher your blood pressure, the more your risk increases. Your doctor will give you a goal for your blood pressure. Your goal will be based on your health and your age. An example of a goal is to keep your blood pressure below 140/90. Lifestyle changes, such as eating healthy and being active, are always important to help lower blood pressure. You might also take medicine to reach your blood pressure goal.  Follow-up care is a key part of your treatment and safety.  Be sure to make and go to all appointments, and call your doctor if you are having problems. It's also a good idea to know your test results and keep a list of the medicines you take. How can you care for yourself at home? Medical treatment  · If you stop taking your medicine, your blood pressure will go back up. You may take one or more types of medicine to lower your blood pressure. Be safe with medicines. Take your medicine exactly as prescribed. Call your doctor if you think you are having a problem with your medicine. · Talk to your doctor before you start taking aspirin every day. Aspirin can help certain people lower their risk of a heart attack or stroke. But taking aspirin isn't right for everyone, because it can cause serious bleeding. · See your doctor regularly. You may need to see the doctor more often at first or until your blood pressure comes down. · If you are taking blood pressure medicine, talk to your doctor before you take decongestants or anti-inflammatory medicine, such as ibuprofen. Some of these medicines can raise blood pressure. · Learn how to check your blood pressure at home. Lifestyle changes  · Stay at a healthy weight. This is especially important if you put on weight around the waist. Losing even 10 pounds can help you lower your blood pressure. · If your doctor recommends it, get more exercise. Walking is a good choice. Bit by bit, increase the amount you walk every day. Try for at least 30 minutes on most days of the week. You also may want to swim, bike, or do other activities. · Avoid or limit alcohol. Talk to your doctor about whether you can drink any alcohol. · Try to limit how much sodium you eat to less than 2,300 milligrams (mg) a day. Your doctor may ask you to try to eat less than 1,500 mg a day. · Eat plenty of fruits (such as bananas and oranges), vegetables, legumes, whole grains, and low-fat dairy products. · Lower the amount of saturated fat in your diet.  Saturated fat is found in animal products such as milk, cheese, and meat. Limiting these foods may help you lose weight and also lower your risk for heart disease. · Do not smoke. Smoking increases your risk for heart attack and stroke. If you need help quitting, talk to your doctor about stop-smoking programs and medicines. These can increase your chances of quitting for good. When should you call for help? Call 911 anytime you think you may need emergency care. This may mean having symptoms that suggest that your blood pressure is causing a serious heart or blood vessel problem. Your blood pressure may be over 180/110. For example, call 911 if:  · You have symptoms of a heart attack. These may include:  ¨ Chest pain or pressure, or a strange feeling in the chest.  ¨ Sweating. ¨ Shortness of breath. ¨ Nausea or vomiting. ¨ Pain, pressure, or a strange feeling in the back, neck, jaw, or upper belly or in one or both shoulders or arms. ¨ Lightheadedness or sudden weakness. ¨ A fast or irregular heartbeat. · You have symptoms of a stroke. These may include:  ¨ Sudden numbness, tingling, weakness, or loss of movement in your face, arm, or leg, especially on only one side of your body. ¨ Sudden vision changes. ¨ Sudden trouble speaking. ¨ Sudden confusion or trouble understanding simple statements. ¨ Sudden problems with walking or balance. ¨ A sudden, severe headache that is different from past headaches. · You have severe back or belly pain. Do not wait until your blood pressure comes down on its own. Get help right away. Call your doctor now or seek immediate care if:  · Your blood pressure is much higher than normal (such as 180/110 or higher), but you don't have symptoms. · You think high blood pressure is causing symptoms, such as:  ¨ Severe headache. ¨ Blurry vision. Watch closely for changes in your health, and be sure to contact your doctor if:  · Your blood pressure measures 140/90 or higher at least 2 times.  That means the top number is 140 or higher or the bottom number is 90 or higher, or both. · You think you may be having side effects from your blood pressure medicine. · Your blood pressure is usually normal, but it goes above normal at least 2 times. Where can you learn more? Go to http://lavonne-frances.info/. Enter P661 in the search box to learn more about \"High Blood Pressure: Care Instructions. \"  Current as of: August 8, 2016  Content Version: 11.1  © 1879-4367 [employer] Care instructions adapted under license by [employer] (which disclaims liability or warranty for this information). If you have questions about a medical condition or this instruction, always ask your healthcare professional. [address] any warranty or liability for your use of this information. Jock Itch: Care Instructions  Your Care Instructions  Jock itch is a fungal infection of the groin. The fungus that causes jock itch lives on your skin. It often affects male athletes, but anyone can get jock itch. You may get an itchy rash on your inner thighs and rear end (buttocks). It spreads and starts to itch when you sweat or are in steamy showers or locker rooms. Jock itch should end soon if you keep your skin dry after you clean it. You can treat jock itch at home with antifungal creams and powders that you can buy without a prescription. Follow-up care is a key part of your treatment and safety. Be sure to make and go to all appointments, and call your doctor if you are having problems. Its also a good idea to know your test results and keep a list of the medicines you take. How can you care for yourself at home? · Wash the rash with soap and water. · Wet a soft cloth with cool water alone or mixed with baking soda or essential oils such as lavender or bari. Put the cool compress on the skin to relieve itching.   · If you have large areas of blisters or sores, use compresses such as those made with Burow's solution (available without a prescription) to soothe and dry out the blisters. · Spread antifungal cream or powder over, and beyond the edge of, the rash. Follow the directions on the package. · If your doctor prescribed medicine, take it exactly as directed. Call your doctor if you have any problems with your medicine. · Try not to scratch the rash. · Shower or bathe daily and after you exercise. · Keep your skin dry as much as possible to allow it to heal.  · Until your jock itch is cured, wear loose-fitting cotton clothing. Avoid tight underwear, pants, and panty hose. · Wash your supporters and shorts after every wearing. · Do not share clothing, sports equipment, towels, or sheets to avoid spreading the fungi to other people. To prevent jock itch  · Put on socks before you put on underwear if you have athlete's foot. This action helps prevent the fungus on your feet from spreading to your groin. · Wash your workout clothes, underwear, socks, and towels after each use. · Keep your groin, inner thighs, and buttocks clean and dry, especially after you exercise and shower. · Use a powder on your groin, inner thighs, and buttocks to help keep these areas dry. · Do not borrow or lend clothing, sports equipment, towels, or sheets. · Wear slippers or sandals in locker rooms, showers, and public bathing areas. When should you call for help? Call your doctor now or seek immediate medical care if:  · You have signs of infection, such as:  ¨ Increased pain, swelling, warmth, or redness. ¨ Red streaks leading from the rash. ¨ Pus draining from the rash. ¨ A fever. Watch closely for changes in your health, and be sure to contact your doctor if:  · You do not get better after 2 weeks of home care. · Your rash appears to spread even after treatment. Where can you learn more? Go to http://lavonne-frances.info/.   Enter G303 in the search box to learn more about \"Jock Itch: Care Instructions. \"  Current as of: February 5, 2016  Content Version: 11.1  © 2269-7868 [employer]. Care instructions adapted under license by [employer] (which disclaims liability or warranty for this information). If you have questions about a medical condition or this instruction, always ask your healthcare professional. [address] any warranty or liability for your use of this information.

## 2017-02-21 RX ORDER — HYDROCHLOROTHIAZIDE 25 MG/1
TABLET ORAL
Qty: 90 TAB | Refills: 1 | Status: SHIPPED | OUTPATIENT
Start: 2017-02-21 | End: 2017-09-27 | Stop reason: SDUPTHER

## 2017-02-21 NOTE — ACP (ADVANCE CARE PLANNING)
Advance Care Planning (ACP) Provider Conversation Snapshot    Date of ACP Conversation: 02/20/17  Persons included in Conversation:  patient  Length of ACP Conversation in minutes:  16 minutes    Authorized Decision Maker (if patient is incapable of making informed decisions): This person is:   patient will discuss with wife and will fill out forms and bring them back at next visit.           For Patients with Decision Making Capacity:   Values/Goals: Exploration of values, goals, and preferences if recovery is not expected, even with continued medical treatment in the event of:  Imminent death  Severe, permanent brain injury    Conversation Outcomes / Follow-Up Plan:   Recommended completion of Advance Directive form after review of ACP materials and conversation with prospective healthcare agent      Cami Roman MD

## 2017-03-03 DIAGNOSIS — R51.9 HEADACHE, UNSPECIFIED HEADACHE TYPE: ICD-10-CM

## 2017-03-03 RX ORDER — SUMATRIPTAN 50 MG/1
50 TABLET, FILM COATED ORAL
Qty: 9 TAB | Refills: 0 | Status: SHIPPED | OUTPATIENT
Start: 2017-03-03 | End: 2017-04-07

## 2017-03-06 ENCOUNTER — OFFICE VISIT (OUTPATIENT)
Dept: FAMILY MEDICINE CLINIC | Age: 67
End: 2017-03-06

## 2017-03-06 VITALS
RESPIRATION RATE: 18 BRPM | SYSTOLIC BLOOD PRESSURE: 150 MMHG | BODY MASS INDEX: 31.02 KG/M2 | WEIGHT: 186.2 LBS | DIASTOLIC BLOOD PRESSURE: 94 MMHG | HEART RATE: 81 BPM | OXYGEN SATURATION: 97 % | HEIGHT: 65 IN

## 2017-03-06 DIAGNOSIS — E78.5 DYSLIPIDEMIA: ICD-10-CM

## 2017-03-06 DIAGNOSIS — F39 MOOD DISORDER (HCC): ICD-10-CM

## 2017-03-06 DIAGNOSIS — R04.0 EPISTAXIS: ICD-10-CM

## 2017-03-06 DIAGNOSIS — R51.9 HEADACHE, UNSPECIFIED HEADACHE TYPE: ICD-10-CM

## 2017-03-06 DIAGNOSIS — I10 ESSENTIAL HYPERTENSION: Primary | ICD-10-CM

## 2017-03-06 RX ORDER — ATENOLOL 50 MG/1
50 TABLET ORAL DAILY
Qty: 90 TAB | Refills: 2 | Status: SHIPPED | OUTPATIENT
Start: 2017-03-06 | End: 2017-09-27 | Stop reason: ALTCHOICE

## 2017-03-06 RX ORDER — ATENOLOL 50 MG/1
50 TABLET ORAL DAILY
Qty: 30 TAB | Refills: 2 | Status: SHIPPED | OUTPATIENT
Start: 2017-03-06 | End: 2017-03-06 | Stop reason: SDUPTHER

## 2017-03-06 NOTE — PROGRESS NOTES
Chief Complaint   Patient presents with    Follow-up     Nose bleeds have improved      1. Have you been to the ER, urgent care clinic since your last visit? Hospitalized since your last visit? No    2. Have you seen or consulted any other health care providers outside of the [address] [place] [address] since your last visit? Include any pap smears or colon screening. No      Patient here today for a follow up visit. Patients blood pressure is elevated today. Patient states he feels much better today. Blood pressure on left arm the first time 164/99, second time elavated is 150/94. [facility]. comes in for f/u care. 1) Headache: still has headache on and off but rare. No nausea or vomiting. He is yet to get head CT scan done. He is on imitrex as needed. 2) HTN: His BP is still elevated. Will add atenolol to treatment. He is on amlodipine, HCTZ and losartan. F/u in 1 month. 3) Epistaxis: resolved. This came with headaches but is now improved. 4) Mood disorder: patient has h/o mood changes and anger. Taking zoloft and this helped him in calming down. He also prefers to walk away in stressful situations. 5) Dyslipidemia: on lipitor, stable. Past Medical History  Past Medical History:   Diagnosis Date    BPH (benign prostatic hyperplasia)     Depression     Hypertension     Irritability and anger        Surgical History  No past surgical history on file. Medications  Current Outpatient Prescriptions   Medication Sig Dispense Refill    atenolol (TENORMIN) 50 mg tablet Take 1 Tab by mouth daily. 30 Tab 2    SUMAtriptan (IMITREX) 50 mg tablet Take 1 Tab by mouth once as needed for Migraine for up to 1 dose. May take another 50 mg tab in 2 hrs for headache. Max 2 doses in 24 hrs. 9 Tab 0    hydroCHLOROthiazide (HYDRODIURIL) 25 mg tablet TAKE 1 TABLET BY MOUTH DAILY 90 Tab 1    sertraline (ZOLOFT) 50 mg tablet Take 2 Tabs by mouth daily.  180 Tab 3    clotrimazole-betamethasone (LOTRISONE) topical cream Apply  to affected area two (2) times a day. 45 g 1    atorvastatin (LIPITOR) 40 mg tablet TAKE 1 TABLET BY MOUTH DAILY 90 Tab 2    amLODIPine (NORVASC) 10 mg tablet TK 1 T PO D FOR BP  2    FLUAD [id number], 65 YR UP,,PF, syrg injection ADM 0.5ML IM UTD  0    PREVNAR 13, PF, 0.5 mL syrg injection ADM 0.5ML IM UTD  0    losartan (COZAAR) 100 mg tablet Take 1 Tab by mouth daily. Indications: Hypertension 90 Tab 1       Allergies  No Known Allergies    Family History  Family History   Problem Relation Age of Onset    Diabetes Mother     Heart Disease Father     Diabetes Father        Social History  Social History     Social History    Marital status: SINGLE     Spouse name: N/A    Number of children: N/A    Years of education: N/A     Occupational History    Not on file.      Social History Main Topics    Smoking status: Never Smoker    Smokeless tobacco: Not on file    Alcohol use No    Drug use: Not on file    Sexual activity: Yes     Partners: Female     Other Topics Concern    Not on file     Social History Narrative       Review of Systems  Review of Systems - History obtained from chart review and the patient  General ROS: negative for - chills, fatigue or fever  Psychological ROS: positive for - behavioral disorder, concentration difficulties, depression and mood swings  Ophthalmic ROS: negative  Respiratory ROS: no cough, shortness of breath, or wheezing  Cardiovascular ROS: no chest pain or dyspnea on exertion  Gastrointestinal ROS: no abdominal pain, change in bowel habits, or black or bloody stools  Genito-Urinary ROS: no dysuria, trouble voiding, or hematuria  Musculoskeletal ROS: negative  Neurological ROS: positive for - headaches  negative for - impaired coordination/balance, numbness/tingling or weakness    Vital Signs  Visit Vitals    BP (!) 150/94 (BP 1 Location: Left arm, BP Patient Position: Sitting)    Pulse 81    Resp 18    Ht 5' 5\" (1.651 m)    Wt 186 lb 3.2 oz (84.5 kg)    SpO2 97%    BMI 30.99 kg/m2         Physical Exam  Physical Examination: General appearance - oriented to person, place, and time, acyanotic, in no respiratory distress and well hydrated  Mental status - alert, oriented to person, place, and time, affect appropriate to mood  Mouth - mucous membranes moist, pharynx normal without lesions  Chest - no tachypnea, retractions or cyanosis  Heart - S1 and S2 normal  Neurological - alert, oriented, normal speech, no focal findings or movement disorder noted, motor and sensory grossly normal bilaterally  Musculoskeletal - no joint tenderness, deformity or swelling    Diagnostics  Orders Placed This Encounter    atenolol (TENORMIN) 50 mg tablet     Sig: Take 1 Tab by mouth daily. Dispense:  30 Tab     Refill:  2         Results  Results for orders placed or performed in visit on 02/20/17   AMB POC URINALYSIS DIP STICK AUTO W/O MICRO   Result Value Ref Range    Color (UA POC) Dark Yellow     Clarity (UA POC) Clear     Glucose (UA POC) Negative Negative    Bilirubin (UA POC) Negative Negative    Ketones (UA POC) Negative Negative    Specific gravity (UA POC) 1.020 1.001 - 1.035    Blood (UA POC) Negative Negative    pH (UA POC) 7.0 4.6 - 8.0    Protein (UA POC) Negative Negative mg/dL    Urobilinogen (UA POC) 1 mg/dL 0.2 - 1    Nitrites (UA POC) Negative Negative    Leukocyte esterase (UA POC) Negative Negative       ASSESSMENT and PLAN    ICD-10-CM ICD-9-CM    1. Essential hypertension I10 401.9 atenolol (TENORMIN) 50 mg tablet   2. Mood disorder (HCC) F39 296.90    3. Headache, unspecified headache type R51 784.0    4. Epistaxis R04.0 784.7    5.  Dyslipidemia E78.5 272.4      reviewed diet, exercise and weight control  cardiovascular risk and specific lipid/LDL goals reviewed  reviewed medications and side effects in detail  radiology results and schedule of future radiology studies reviewed with patient    I have discussed the diagnosis with the patient and the intended plan of care as seen in the above orders. The patient has received an after-visit summary and questions were answered concerning future plans. I have discussed medication, side effects, and warnings with the patient in detail. The patient verbalized understanding and is in agreement with the plan of care. The patient will contact the office with any additional concerns.     Kong Thakur MD

## 2017-03-06 NOTE — PATIENT INSTRUCTIONS

## 2017-03-06 NOTE — MR AVS SNAPSHOT
Visit Information Date & Time Provider Department Dept. Phone Encounter #  
 3/6/2017  4:00 PM Cami Mckinley MD [facility] [phone] [id number] Follow-up Instructions Return in about 1 month (around 4/6/2017), or if symptoms worsen or fail to improve, for htn. Your Appointments 3/15/2017  3:30 PM  
COLON SCREEN with TSS HBV NURSE VISIT [address]na [address] ([facility]) Appt Note: Dr David Ward referring / Sam Medeiros [address] [place] [address] [address] [place] [address]  
[facility] [address] [place] [address] Upcoming Health Maintenance Date Due DTaP/Tdap/Td series (1 - Tdap) 12/12/1970 FOBT Q 1 YEAR AGE 50-75 12/12/1999 ZOSTER VACCINE AGE 60> 12/12/2009 GLAUCOMA SCREENING Q2Y 12/12/2014 Pneumococcal 65+ Low/Medium Risk (2 of 2 - PPSV23) 1/19/2018 MEDICARE YEARLY EXAM 2/21/2018 Allergies as of 3/6/2017  Review Complete On: 3/6/2017 By: Hayley Dunaway MD  
 No Known Allergies Current Immunizations  Never Reviewed No immunizations on file. Not reviewed this visit You Were Diagnosed With   
  
 Codes Comments Essential hypertension    -  Primary ICD-10-CM: I10 
ICD-9-CM: 401.9 Vitals BP Pulse Resp Height(growth percentile) Weight(growth percentile) SpO2  
 (!) 150/94 (BP 1 Location: Left arm, BP Patient Position: Sitting) 81 18 5' 5\" (1.651 m) 186 lb 3.2 oz (84.5 kg) 97% BMI Smoking Status 30.99 kg/m2 Never Smoker Vitals History BMI and BSA Data Body Mass Index Body Surface Area 30.99 kg/m 2 1.97 m 2 Preferred Pharmacy Pharmacy Name Phone [address] [facility], [address] [place] [address] [phone] Your Updated Medication List  
  
   
This list is accurate as of: 3/6/17  4:49 PM.  Always use your most recent med list. amLODIPine 10 mg tablet Commonly known as:  Cr Presser TK 1 T PO D FOR BP  
  
 atenolol 50 mg tablet Commonly known as:  TENORMIN Take 1 Tab by mouth daily. atorvastatin 40 mg tablet Commonly known as:  LIPITOR  
TAKE 1 TABLET BY MOUTH DAILY  
  
 clotrimazole-betamethasone topical cream  
Commonly known as:  Brenda Pick Apply  to affected area two (2) times a day. FLUAD 0111-6831 (65 YR UP)(PF) Syrg injection Generic drug:  influenza vaccine 2016-17 (65 yr+)(PF)  
ADM 0.5ML IM UTD  
  
 hydroCHLOROthiazide 25 mg tablet Commonly known as:  HYDRODIURIL  
TAKE 1 TABLET BY MOUTH DAILY losartan 100 mg tablet Commonly known as:  COZAAR Take 1 Tab by mouth daily. Indications: Hypertension PREVNAR 13 (PF) 0.5 mL Syrg injection Generic drug:  pneumococcal 13 jim conj dip ADM 0.5ML IM UTD  
  
 sertraline 50 mg tablet Commonly known as:  ZOLOFT Take 2 Tabs by mouth daily. SUMAtriptan 50 mg tablet Commonly known as:  IMITREX Take 1 Tab by mouth once as needed for Migraine for up to 1 dose. May take another 50 mg tab in 2 hrs for headache. Max 2 doses in 24 hrs. Prescriptions Sent to Pharmacy Refills  
 atenolol (TENORMIN) 50 mg tablet 2 Sig: Take 1 Tab by mouth daily. Class: Normal  
 Pharmacy: [facility], [address] [facility] #: [phone] Route: Oral  
  
Follow-up Instructions Return in about 1 month (around 4/6/2017), or if symptoms worsen or fail to improve, for htn. Patient Instructions High Blood Pressure: Care Instructions Your Care Instructions If your blood pressure is usually above 140/90, you have high blood pressure, or hypertension. That means the top number is 140 or higher or the bottom number is 90 or higher, or both.  
Despite what a lot of people think, high blood pressure usually doesn't cause headaches or make you feel dizzy or lightheaded. It usually has no symptoms. But it does increase your risk for heart attack, stroke, and kidney or eye damage. The higher your blood pressure, the more your risk increases. Your doctor will give you a goal for your blood pressure. Your goal will be based on your health and your age. An example of a goal is to keep your blood pressure below 140/90. Lifestyle changes, such as eating healthy and being active, are always important to help lower blood pressure. You might also take medicine to reach your blood pressure goal. 
Follow-up care is a key part of your treatment and safety. Be sure to make and go to all appointments, and call your doctor if you are having problems. It's also a good idea to know your test results and keep a list of the medicines you take. How can you care for yourself at home? Medical treatment · If you stop taking your medicine, your blood pressure will go back up. You may take one or more types of medicine to lower your blood pressure. Be safe with medicines. Take your medicine exactly as prescribed. Call your doctor if you think you are having a problem with your medicine. · Talk to your doctor before you start taking aspirin every day. Aspirin can help certain people lower their risk of a heart attack or stroke. But taking aspirin isn't right for everyone, because it can cause serious bleeding. · See your doctor regularly. You may need to see the doctor more often at first or until your blood pressure comes down. · If you are taking blood pressure medicine, talk to your doctor before you take decongestants or anti-inflammatory medicine, such as ibuprofen. Some of these medicines can raise blood pressure. · Learn how to check your blood pressure at home. Lifestyle changes · Stay at a healthy weight. This is especially important if you put on weight around the waist. Losing even 10 pounds can help you lower your blood pressure. · If your doctor recommends it, get more exercise. Walking is a good choice. Bit by bit, increase the amount you walk every day. Try for at least 30 minutes on most days of the week. You also may want to swim, bike, or do other activities. · Avoid or limit alcohol. Talk to your doctor about whether you can drink any alcohol. · Try to limit how much sodium you eat to less than 2,300 milligrams (mg) a day. Your doctor may ask you to try to eat less than 1,500 mg a day. · Eat plenty of fruits (such as bananas and oranges), vegetables, legumes, whole grains, and low-fat dairy products. · Lower the amount of saturated fat in your diet. Saturated fat is found in animal products such as milk, cheese, and meat. Limiting these foods may help you lose weight and also lower your risk for heart disease. · Do not smoke. Smoking increases your risk for heart attack and stroke. If you need help quitting, talk to your doctor about stop-smoking programs and medicines. These can increase your chances of quitting for good. When should you call for help? Call 911 anytime you think you may need emergency care. This may mean having symptoms that suggest that your blood pressure is causing a serious heart or blood vessel problem. Your blood pressure may be over 180/110. For example, call 911 if: 
· You have symptoms of a heart attack. These may include: ¨ Chest pain or pressure, or a strange feeling in the chest. 
¨ Sweating. ¨ Shortness of breath. ¨ Nausea or vomiting. ¨ Pain, pressure, or a strange feeling in the back, neck, jaw, or upper belly or in one or both shoulders or arms. ¨ Lightheadedness or sudden weakness. ¨ A fast or irregular heartbeat. · You have symptoms of a stroke. These may include: 
¨ Sudden numbness, tingling, weakness, or loss of movement in your face, arm, or leg, especially on only one side of your body. ¨ Sudden vision changes. ¨ Sudden trouble speaking. ¨ Sudden confusion or trouble understanding simple statements. ¨ Sudden problems with walking or balance. ¨ A sudden, severe headache that is different from past headaches. · You have severe back or belly pain. Do not wait until your blood pressure comes down on its own. Get help right away. Call your doctor now or seek immediate care if: 
· Your blood pressure is much higher than normal (such as 180/110 or higher), but you don't have symptoms. · You think high blood pressure is causing symptoms, such as: ¨ Severe headache. ¨ Blurry vision. Watch closely for changes in your health, and be sure to contact your doctor if: 
· Your blood pressure measures 140/90 or higher at least 2 times. That means the top number is 140 or higher or the bottom number is 90 or higher, or both. · You think you may be having side effects from your blood pressure medicine. · Your blood pressure is usually normal, but it goes above normal at least 2 times. Where can you learn more? Go to http://lavonne-frances.info/. Enter X210 in the search box to learn more about \"High Blood Pressure: Care Instructions. \" Current as of: August 8, 2016 Content Version: 11.1 © 5972-2476 [employer]. Care instructions adapted under license by [employer] (which disclaims liability or warranty for this information). If you have questions about a medical condition or this instruction, always ask your healthcare professional. [place] [address] any warranty or liability for your use of this information. Introducing [facility] & HEALTH SERVICES! [facility] introduces [employer] patient portal. Now you can access parts of your medical record, email your doctor's office, and request medication refills online. 1. In your internet browser, go to [email or web address] 2. Click on the First Time User? Click Here link in the Sign In box. You will see the New Member Sign Up page. 3. Enter your Fanchimp Access Code exactly as it appears below. You will not need to use this code after youve completed the sign-up process. If you do not sign up before the expiration date, you must request a new code. · F[employer] Access Code: [email or web address] Expires: 4/27/2017  9:49 AM 
 
4. Enter the last four digits of your Social Security Number (xxxx) and Date of Birth (mm/dd/yyyy) as indicated and click Submit. You will be taken to the next sign-up page. 5. Create a Fanchimp ID. This will be your Fanchimp login ID and cannot be changed, so think of one that is secure and easy to remember. 6. Create a Fanchimp password. You can change your password at any time. 7. Enter your Password Reset Question and Answer. This can be used at a later time if you forget your password. 8. Enter your e-mail address. You will receive e-mail notification when new information is available in [address] 9. Click Sign Up. You can now view and download portions of your medical record. 10. Click the Download Summary menu link to download a portable copy of your medical information. If you have questions, please visit the Frequently Asked Questions section of the [employer] website. Remember, [employer]imp is NOT to be used for urgent needs. For medical emergencies, dial 911. Now available from your iPhone and Android! Please provide this summary of care documentation to your next provider. Your primary care clinician is listed as Cami Mcgee. If you have any questions after today's visit, please call [phone].

## 2017-03-15 ENCOUNTER — OFFICE VISIT (OUTPATIENT)
Dept: SURGERY | Age: 67
End: 2017-03-15

## 2017-03-15 VITALS
HEIGHT: 65 IN | WEIGHT: 187.39 LBS | BODY MASS INDEX: 31.22 KG/M2 | OXYGEN SATURATION: 99 % | RESPIRATION RATE: 16 BRPM | HEART RATE: 68 BPM | TEMPERATURE: 98.1 F

## 2017-03-15 DIAGNOSIS — Z12.11 COLON CANCER SCREENING: Primary | ICD-10-CM

## 2017-03-15 NOTE — MR AVS SNAPSHOT
Visit Information Date & Time Provider Department Dept. Phone Encounter #  
 3/15/2017  3:30 PM TSS HBV NURSE VISIT [facility] [phone] [id number] Your Appointments 4/3/2017  3:45 PM  
Follow Up with MD Radha Jimenez ([facility]) Appt Note: f/u  
 [address] [facility] [address]  
  
   
 [address] [facility] Upcoming Health Maintenance Date Due DTaP/Tdap/Td series (1 - Tdap) 12/12/1970 FOBT Q 1 YEAR AGE 50-75 12/12/1999 ZOSTER VACCINE AGE 60> 12/12/2009 GLAUCOMA SCREENING Q2Y 12/12/2014 Pneumococcal 65+ Low/Medium Risk (2 of 2 - PPSV23) 1/19/2018 MEDICARE YEARLY EXAM 2/21/2018 Allergies as of 3/15/2017  Review Complete On: 3/6/2017 By: Cary Blum MD  
 No Known Allergies Current Immunizations  Never Reviewed No immunizations on file. Not reviewed this visit Vitals Resp Height(growth percentile) Weight(growth percentile) BMI Smoking Status 16 5' 5\" (1.651 m) 187 lb 6.3 oz (85 kg) 31.18 kg/m2 Never Smoker Vitals History BMI and BSA Data Body Mass Index Body Surface Area  
 31.18 kg/m 2 1.97 m 2 Preferred Pharmacy Pharmacy Name Phone 417 [facility], [address] [place] [address] [phone] Your Updated Medication List  
  
   
This list is accurate as of: 3/15/17  3:38 PM.  Always use your most recent med list. amLODIPine 10 mg tablet Commonly known as:  Brito Del TK 1 T PO D FOR BP  
  
 atenolol 50 mg tablet Commonly known as:  TENORMIN Take 1 Tab by mouth daily. atorvastatin 40 mg tablet Commonly known as:  LIPITOR  
TAKE 1 TABLET BY MOUTH DAILY  
  
 clotrimazole-betamethasone topical cream  
Commonly known as:  Mcwilliams Scriver Apply  to affected area two (2) times a day. FLUAD [id number] (65 YR UP)(PF) Syrg injection Generic drug:  influenza vaccine 2016-17 (65 yr+)(PF)  
ADM 0.5ML IM UTD  
  
 hydroCHLOROthiazide 25 mg tablet Commonly known as:  HYDRODIURIL  
TAKE 1 TABLET BY MOUTH DAILY losartan 100 mg tablet Commonly known as:  COZAAR Take 1 Tab by mouth daily. Indications: Hypertension PREVNAR 13 (PF) 0.5 mL Syrg injection Generic drug:  pneumococcal 13 jim conj dip ADM 0.5ML IM UTD  
  
 sertraline 50 mg tablet Commonly known as:  ZOLOFT Take 2 Tabs by mouth daily. SUMAtriptan 50 mg tablet Commonly known as:  IMITREX Take 1 Tab by mouth once as needed for Migraine for up to 1 dose. May take another 50 mg tab in 2 hrs for headache. Max 2 doses in 24 hrs. Introducing [facility] & HEALTH SERVICES! Steve Abbott introduces [employer] patient portal. Now you can access parts of your medical record, email your doctor's office, and request medication refills online. 1. In your internet browser, go to [email or web address] 2. Click on the First Time User? Click Here link in the Sign In box. You will see the New Member Sign Up page. 3. Enter your Powerphotonic Access Code exactly as it appears below. You will not need to use this code after youve completed the sign-up process. If you do not sign up before the expiration date, you must request a new code. · Power[employer]onic Access Code: [email or web address] Expires: 4/27/2017 10:49 AM 
 
4. Enter the last four digits of your Social Security Number (xxxx) and Date of Birth (mm/dd/yyyy) as indicated and click Submit. You will be taken to the next sign-up page. 5. Create a Powerphotonic ID. This will be your Powerphotonic login ID and cannot be changed, so think of one that is secure and easy to remember. 6. Create a Powerphotonic password. You can change your password at any time. 7. Enter your Password Reset Question and Answer. This can be used at a later time if you forget your password. 8. Enter your e-mail address. You will receive e-mail notification when new information is available in [address] 9. Click Sign Up. You can now view and download portions of your medical record. 10. Click the Download Summary menu link to download a portable copy of your medical information. If you have questions, please visit the Frequently Asked Questions section of the [employer] website. Remember, [employer] is NOT to be used for urgent needs. For medical emergencies, dial 911. Now available from your iPhone and Android! Please provide this summary of care documentation to your next provider. Your primary care clinician is listed as Cami Mcgee. If you have any questions after today's visit, please call [phone].

## 2017-03-15 NOTE — PROGRESS NOTES
Review of Systems   Constitutional: Negative. HENT: Positive for nosebleeds. Negative for congestion, ear discharge, ear pain, hearing loss, sore throat and tinnitus. Eyes: Positive for blurred vision. Negative for double vision, photophobia, pain, discharge and redness. Respiratory: Positive for wheezing. Negative for cough, hemoptysis, sputum production, shortness of breath and stridor. Cardiovascular: Positive for chest pain. Negative for palpitations, orthopnea, claudication, leg swelling and PND. Gastrointestinal: Positive for heartburn. Negative for abdominal pain, nausea and vomiting. Genitourinary: Negative. Dark colored urine   Musculoskeletal: Positive for joint pain. Negative for back pain, falls, myalgias and neck pain. Skin: Positive for itching and rash. Fingers, back and thigh   Neurological: Positive for dizziness and headaches. Negative for tingling, tremors, sensory change, speech change, focal weakness, seizures and loss of consciousness. Endo/Heme/Allergies: Negative for environmental allergies and polydipsia. Bruises/bleeds easily. Psychiatric/Behavioral: Negative. Colon Screen    Patient: Zenon Walters MRN: [id number]  SSN: [id number]    YOB: 1950  Age: 78 y.o. Sex: male        Subjective: Zenon Orantes. was referred by his PCP, Haresh Arias MD.  Patient referred for colonoscopy for   Screening colonoscopy. Patient denies rectal pain or bleeding. Abdominal surgeries as described below, specifically none. Family history as described below, specifically none. Patient has never had a colonoscopy.     No Known Allergies    Past Medical History:   Diagnosis Date    BPH (benign prostatic hyperplasia)     Depression     Hypertension     Irritability and anger      Past Surgical History:   Procedure Laterality Date    HX OTHER SURGICAL  1993    plate placed in head due to injury      Family History   Problem Relation Age of Onset    Diabetes Mother     Heart Disease Father     Diabetes Father      Social History   Substance Use Topics    Smoking status: Never Smoker    Smokeless tobacco: Not on file    Alcohol use No      Prior to Admission medications    Medication Sig Start Date End Date Taking? Authorizing Provider   atenolol (TENORMIN) 50 mg tablet Take 1 Tab by mouth daily. 3/6/17  Yes Cami Mcgee MD   SUMAtriptan (IMITREX) 50 mg tablet Take 1 Tab by mouth once as needed for Migraine for up to 1 dose. May take another 50 mg tab in 2 hrs for headache. Max 2 doses in 24 hrs. 3/3/17  Yes Cami Mcgee MD   hydroCHLOROthiazide (HYDRODIURIL) 25 mg tablet TAKE 1 TABLET BY MOUTH DAILY 2/21/17  Yes Cami Galindo MD   sertraline (ZOLOFT) 50 mg tablet Take 2 Tabs by mouth daily. 2/20/17  Yes Cami Galindo MD   clotrimazole-betamethasone (LOTRISONE) topical cream Apply  to affected area two (2) times a day. 2/20/17  Yes Cami Mcgee MD   atorvastatin (LIPITOR) 40 mg tablet TAKE 1 TABLET BY MOUTH DAILY 2/1/17  Yes Cami Galindo MD   amLODIPine (NORVASC) 10 mg tablet TK 1 T PO D FOR BP 12/20/16  Yes Historical Provider   losartan (COZAAR) 100 mg tablet Take 1 Tab by mouth daily. Indications: Hypertension 1/27/17  Yes Cami Galindo MD   FLUAD [id number], 65 YR UP,,PF, syrg injection ADM 0.5ML IM UTD 1/9/17   Historical Provider   PREVNAR 13, PF, 0.5 mL syrg injection ADM 0.5ML IM UTD 1/9/17   Historical Provider          Review of Systems:    Risks colonoscopy described- colon injury, missed lesion, anesthesia problems, bleeding       Daisy Wilkinson, LPN  March 15, 2398  3:45 PM

## 2017-04-07 ENCOUNTER — OFFICE VISIT (OUTPATIENT)
Dept: FAMILY MEDICINE CLINIC | Age: 67
End: 2017-04-07

## 2017-04-07 VITALS
RESPIRATION RATE: 18 BRPM | BODY MASS INDEX: 30.32 KG/M2 | HEART RATE: 61 BPM | SYSTOLIC BLOOD PRESSURE: 128 MMHG | HEIGHT: 65 IN | TEMPERATURE: 98.3 F | WEIGHT: 182 LBS | DIASTOLIC BLOOD PRESSURE: 84 MMHG | OXYGEN SATURATION: 98 %

## 2017-04-07 DIAGNOSIS — F39 MOOD DISORDER (HCC): ICD-10-CM

## 2017-04-07 DIAGNOSIS — I10 ESSENTIAL HYPERTENSION: Primary | ICD-10-CM

## 2017-04-07 DIAGNOSIS — B35.3 TINEA PEDIS OF BOTH FEET: ICD-10-CM

## 2017-04-07 RX ORDER — CHLORPHENIRAMINE MALEATE 4 MG
TABLET ORAL 2 TIMES DAILY
Qty: 60 G | Refills: 0 | Status: SHIPPED | OUTPATIENT
Start: 2017-04-07 | End: 2017-06-09

## 2017-04-07 RX ORDER — ITRACONAZOLE 100 MG/1
200 CAPSULE ORAL DAILY
Qty: 28 CAP | Refills: 0 | Status: SHIPPED | OUTPATIENT
Start: 2017-04-07 | End: 2017-04-10

## 2017-04-07 NOTE — PROGRESS NOTES
Chief Complaint   Patient presents with    Hypertension     follow-up    Cholesterol Problem     follow-up     1. Have you been to the ER, urgent care clinic since your last visit? Hospitalized since your last visit? No    2. Have you seen or consulted any other health care providers outside of the [address] [place] [address] since your last visit? Include any pap smears or colon screening. No     HPI  [employer]. comes in for f/u care. 1) HTN: Controlled on medication. Will have him continue with current treatment plan. 2) Dyslipidemia: on statin, continue with medication. 3) Tinea pedis: patient has athletes foot both feet which is extensive. Will give itraconazole and clotrimazole topically. 4) Mood disorder: on zoloft, taking 100 mg daily. Continue the medication. Past Medical History  Past Medical History:   Diagnosis Date    BPH (benign prostatic hyperplasia)     Depression     Hypertension     Irritability and anger        Surgical History  Past Surgical History:   Procedure Laterality Date    HX OTHER SURGICAL  1993    plate placed in head due to injury        Medications  Current Outpatient Prescriptions   Medication Sig Dispense Refill    atenolol (TENORMIN) 50 mg tablet Take 1 Tab by mouth daily. 90 Tab 2    hydroCHLOROthiazide (HYDRODIURIL) 25 mg tablet TAKE 1 TABLET BY MOUTH DAILY 90 Tab 1    sertraline (ZOLOFT) 50 mg tablet Take 2 Tabs by mouth daily. 180 Tab 3    clotrimazole-betamethasone (LOTRISONE) topical cream Apply  to affected area two (2) times a day. 45 g 1    atorvastatin (LIPITOR) 40 mg tablet TAKE 1 TABLET BY MOUTH DAILY 90 Tab 2    amLODIPine (NORVASC) 10 mg tablet TK 1 T PO D FOR BP  2    FLUAD [id number], 65 YR UP,,PF, syrg injection ADM 0.5ML IM UTD  0    losartan (COZAAR) 100 mg tablet Take 1 Tab by mouth daily.  Indications: Hypertension 90 Tab 1    PREVNAR 13, PF, 0.5 mL syrg injection ADM 0.5ML IM UTD  0       Allergies  No Known Allergies    Family History  Family History   Problem Relation Age of Onset    Diabetes Mother     Heart Disease Father     Diabetes Father        Social History  Social History     Social History    Marital status: SINGLE     Spouse name: N/A    Number of children: N/A    Years of education: N/A     Occupational History    Not on file.      Social History Main Topics    Smoking status: Never Smoker    Smokeless tobacco: Not on file    Alcohol use No    Drug use: Not on file    Sexual activity: Yes     Partners: Female     Other Topics Concern    Not on file     Social History Narrative       Review of Systems  Review of Systems - History obtained from spouse, chart review and the patient  General ROS: negative  Psychological ROS: positive for - behavioral disorder and mood swings  Ophthalmic ROS: negative  Respiratory ROS: no cough, shortness of breath, or wheezing  Cardiovascular ROS: no chest pain or dyspnea on exertion  Gastrointestinal ROS: no abdominal pain, change in bowel habits, or black or bloody stools  Genito-Urinary ROS: no dysuria, trouble voiding, or hematuria  Musculoskeletal ROS: negative  Neurological ROS: no TIA or stroke symptoms  Dermatological ROS: rash feet    Vital Signs  Visit Vitals    /84 (BP 1 Location: Left arm, BP Patient Position: Sitting)    Pulse 61    Temp 98.3 °F (36.8 °C) (Oral)    Resp 18    Ht 5' 5\" (1.651 m)    Wt 182 lb (82.6 kg)    SpO2 98%    BMI 30.29 kg/m2         Physical Exam  Physical Examination: General appearance - oriented to person, place, and time and acyanotic, in no respiratory distress  Mental status - alert, oriented to person, place, and time, affect appropriate to mood  Chest - clear to auscultation, no wheezes, rales or rhonchi, symmetric air entry  Heart - S1 and S2 normal  Neurological - motor and sensory grossly normal bilaterally  Musculoskeletal - full range of motion without pain  Extremities - athletes foot rash both feet in the interdigital spaces. Diagnostics  No orders of the defined types were placed in this encounter. Results  Results for orders placed or performed in visit on 02/20/17   AMB POC URINALYSIS DIP STICK AUTO W/O MICRO   Result Value Ref Range    Color (UA POC) Dark Yellow     Clarity (UA POC) Clear     Glucose (UA POC) Negative Negative    Bilirubin (UA POC) Negative Negative    Ketones (UA POC) Negative Negative    Specific gravity (UA POC) 1.020 1.001 - 1.035    Blood (UA POC) Negative Negative    pH (UA POC) 7.0 4.6 - 8.0    Protein (UA POC) Negative Negative mg/dL    Urobilinogen (UA POC) 1 mg/dL 0.2 - 1    Nitrites (UA POC) Negative Negative    Leukocyte esterase (UA POC) Negative Negative       ASSESSMENT and PLAN    ICD-10-CM ICD-9-CM    1. Essential hypertension I10 401.9    2. Mood disorder (HCC) F39 296.90    3. Tinea pedis of both feet B35.3 110.4 itraconazole (SPORONAX) 100 mg capsule      clotrimazole (LOTRIMIN) 1 % topical cream     lab results and schedule of future lab studies reviewed with patient  reviewed diet, exercise and weight control  cardiovascular risk and specific lipid/LDL goals reviewed  reviewed medications and side effects in detail    I have discussed the diagnosis with the patient and the intended plan of care as seen in the above orders. The patient has received an after-visit summary and questions were answered concerning future plans. I have discussed medication, side effects, and warnings with the patient in detail. The patient verbalized understanding and is in agreement with the plan of care. The patient will contact the office with any additional concerns.     Jw Cagle MD

## 2017-04-07 NOTE — PATIENT INSTRUCTIONS
Athlete's Foot: Care Instructions  Your Care Instructions  Athlete's foot is an itchy rash on the foot caused by an infection with a fungus. You can get it by going barefoot in wet public areas, such as swimming pools or locker rooms. Many times there is no clear reason why you get athlete's foot. You can easily treat athlete's foot by putting medicine on your feet for 1 to 6 weeks. In some cases, a doctor may prescribe pills to kill the fungus. Follow-up care is a key part of your treatment and safety. Be sure to make and go to all appointments, and call your doctor if you are having problems. It's also a good idea to know your test results and keep a list of the medicines you take. How can you care for yourself at home? · Your doctor may suggest an over-the counter lotion or spray or may prescribe a medicine. Take your medicines exactly as prescribed. Call your doctor if you think you are having a problem with your medicine. · Keep your feet clean and dry. · When you get dressed, put your socks on before your underwear. This can prevent the fungus from spreading from your feet to your groin. To prevent athlete's foot  · Wear flip-flops or other shower sandals in public locker rooms and showers and by the pool. · Dry between your toes after swimming or bathing. · Wear leather shoes or sandals, which let air get to your feet. · Change your socks as needed so your feet stay as dry as possible. · Use antifungal powder on your feet. When should you call for help? Watch closely for changes in your health, and be sure to contact your doctor if:  · You do not get better as expected. Where can you learn more? Go to http://lavonne-frances.info/. Enter M498 in the search box to learn more about \"Athlete's Foot: Care Instructions. \"  Current as of: October 13, 2016  Content Version: 11.2  © 7401-7957 [employer].  Care instructions adapted under license by [employer] (which disclaims liability or warranty for this information). If you have questions about a medical condition or this instruction, always ask your healthcare professional. [address] any warranty or liability for your use of this information.

## 2017-04-07 NOTE — MR AVS SNAPSHOT
Visit Information Date & Time Provider Department Dept. Phone Encounter #  
 4/7/2017  4:45 PM [facility] [address] [place] Agustin Deal [phone] [id number] Follow-up Instructions Return in about 2 months (around 6/7/2017), or if symptoms worsen or fail to improve, for tinea pedis, HTN. Upcoming Health Maintenance Date Due DTaP/Tdap/Td series (1 - Tdap) 12/12/1970 FOBT Q 1 YEAR AGE 50-75 12/12/1999 ZOSTER VACCINE AGE 60> 12/12/2009 GLAUCOMA SCREENING Q2Y 12/12/2014 Pneumococcal 65+ Low/Medium Risk (2 of 2 - PPSV23) 1/19/2018 MEDICARE YEARLY EXAM 2/21/2018 Allergies as of 4/7/2017  Review Complete On: 4/7/2017 By: Chucho Hurst MD  
 No Known Allergies Current Immunizations  Never Reviewed No immunizations on file. Not reviewed this visit You Were Diagnosed With   
  
 Codes Comments Essential hypertension    -  Primary ICD-10-CM: I10 
ICD-9-CM: 401.9 [facility] ([facility]     ICD-10-CM: F39 
ICD-9-CM: 296.90 Tinea pedis of both feet     ICD-10-CM: B35.3 ICD-9-CM: 110.4 Vitals BP Pulse Temp Resp Height(growth percentile) Weight(growth percentile) 128/84 (BP 1 Location: Left arm, BP Patient Position: Sitting) 61 98.3 °F (36.8 °C) (Oral) 18 5' 5\" (1.651 m) 182 lb (82.6 kg) SpO2 BMI Smoking Status 98% 30.29 kg/m2 Never Smoker BMI and BSA Data Body Mass Index Body Surface Area  
 30.29 kg/m 2 1.95 m 2 Preferred Pharmacy Pharmacy Name Phone [facility], [address] [place] [address] [phone] Your Updated Medication List  
  
   
This list is accurate as of: 4/7/17  5:26 PM.  Always use your most recent med list. amLODIPine 10 mg tablet Commonly known as:  Claudell Hesselbach TK 1 T PO D FOR BP  
  
 atenolol 50 mg tablet Commonly known as:  TENORMIN Take 1 Tab by mouth daily. atorvastatin 40 mg tablet Commonly known as:  LIPITOR  
TAKE 1 TABLET BY MOUTH DAILY  
  
 clotrimazole 1 % topical cream  
Commonly known as:  Sourav [place] Apply  to affected area two (2) times a day. FLUAD [id number] (65 YR UP)(PF) Syrg injection Generic drug:  influenza vaccine 2016-17 (65 yr+)(PF)  
ADM 0.5ML IM UTD  
  
 hydroCHLOROthiazide 25 mg tablet Commonly known as:  HYDRODIURIL  
TAKE 1 TABLET BY MOUTH DAILY  
  
 itraconazole 100 mg capsule Commonly known as:  JOPLUFFG Take 2 Caps by mouth daily for 14 days. losartan 100 mg tablet Commonly known as:  COZAAR Take 1 Tab by mouth daily. Indications: Hypertension PREVNAR 13 (PF) 0.5 mL Syrg injection Generic drug:  pneumococcal 13 jim conj dip ADM 0.5ML IM UTD  
  
 sertraline 50 mg tablet Commonly known as:  ZOLOFT Take 2 Tabs by mouth daily. Prescriptions Sent to Pharmacy Refills  
 itraconazole (SPORONAX) 100 mg capsule 0 Sig: Take 2 Caps by mouth daily for 14 days. Class: Normal  
 Pharmacy: [facility] [address] Ph #: [phone] Route: Oral  
 clotrimazole (LOTRIMIN) 1 % topical cream 0 Sig: Apply  to affected area two (2) times a day. Class: Normal  
 Pharmacy: [facility] [address] Ph #: [phone] Route: Topical  
  
Follow-up Instructions Return in about 2 months (around 6/7/2017), or if symptoms worsen or fail to improve, for tinea pedis, HTN. Patient Instructions Athlete's Foot: Care Instructions Your Care Instructions Athlete's foot is an itchy rash on the foot caused by an infection with a fungus. You can get it by going barefoot in wet public areas, such as swimming pools or locker rooms. Many times there is no clear reason why you get athlete's foot.  You can easily treat athlete's foot by putting medicine on your feet for 1 to 6 weeks. In some cases, a doctor may prescribe pills to kill the fungus. Follow-up care is a key part of your treatment and safety. Be sure to make and go to all appointments, and call your doctor if you are having problems. It's also a good idea to know your test results and keep a list of the medicines you take. How can you care for yourself at home? · Your doctor may suggest an over-the counter lotion or spray or may prescribe a medicine. Take your medicines exactly as prescribed. Call your doctor if you think you are having a problem with your medicine. · Keep your feet clean and dry. · When you get dressed, put your socks on before your underwear. This can prevent the fungus from spreading from your feet to your groin. To prevent athlete's foot · Wear flip-flops or other shower sandals in public locker rooms and showers and by the pool. · Dry between your toes after swimming or bathing. · Wear leather shoes or sandals, which let air get to your feet. · Change your socks as needed so your feet stay as dry as possible. · Use antifungal powder on your feet. When should you call for help? Watch closely for changes in your health, and be sure to contact your doctor if: 
· You do not get better as expected. Where can you learn more? Go to http://lavonne-frances.info/. Enter M498 in the search box to learn more about \"Athlete's Foot: Care Instructions. \" Current as of: October 13, 2016 Content Version: 11.2 © 1204-5836 [employer]. Care instructions adapted under license by [employer] (which disclaims liability or warranty for this information). If you have questions about a medical condition or this instruction, always ask your healthcare professional. [place] [address] any warranty or liability for your use of this information. Introducing [facility]! Tonya Da Silva introduces [employer] Mobile patient portal. Now you can access parts of your medical record, email your doctor's office, and request medication refills online. 1. In your internet browser, go to [email or web address] 2. Click on the First Time User? Click Here link in the Sign In box. You will see the New Member Sign Up page. 3. Enter your [employer] Access Code exactly as it appears below. You will not need to use this code after youve completed the sign-up process. If you do not sign up before the expiration date, you must request a new code. · [employer] Access Code: [email or web address] Expires: 4/27/2017 10:49 AM 
 
4. Enter the last four digits of your Social Security Number (xxxx) and Date of Birth (mm/dd/yyyy) as indicated and click Submit. You will be taken to the next sign-up page. 5. Create a [employer] ID. This will be your [employer] login ID and cannot be changed, so think of one that is secure and easy to remember. 6. Create a [employer] password. You can change your password at any time. 7. Enter your Password Reset Question and Answer. This can be used at a later time if you forget your password. 8. Enter your e-mail address. You will receive e-mail notification when new information is available in [address] 9. Click Sign Up. You can now view and download portions of your medical record. 10. Click the Download Summary menu link to download a portable copy of your medical information. If you have questions, please visit the Frequently Asked Questions section of the [employer] website. Remember, [employer] is NOT to be used for urgent needs. For medical emergencies, dial 911. Now available from your iPhone and Android! Please provide this summary of care documentation to your next provider. Your primary care clinician is listed as Cami Mcgee. If you have any questions after today's visit, please call [phone].

## 2017-04-10 ENCOUNTER — TELEPHONE (OUTPATIENT)
Dept: FAMILY MEDICINE CLINIC | Age: 67
End: 2017-04-10

## 2017-04-10 RX ORDER — FLUCONAZOLE 150 MG/1
150 TABLET ORAL
Qty: 6 TAB | Refills: 0 | Status: SHIPPED | OUTPATIENT
Start: 2017-04-10 | End: 2017-05-16

## 2017-04-10 NOTE — TELEPHONE ENCOUNTER
Informed patient that one of his medication that was sent in on Friday is not covered under his insurance. Dr. Claudeen Glazier changed the medication to Diflucan. Patient verbalized understanding.

## 2017-05-05 DIAGNOSIS — R51.9 HEADACHE, UNSPECIFIED HEADACHE TYPE: ICD-10-CM

## 2017-06-09 ENCOUNTER — OFFICE VISIT (OUTPATIENT)
Dept: FAMILY MEDICINE CLINIC | Age: 67
End: 2017-06-09

## 2017-06-09 VITALS
WEIGHT: 179 LBS | RESPIRATION RATE: 18 BRPM | HEIGHT: 65 IN | OXYGEN SATURATION: 96 % | HEART RATE: 78 BPM | SYSTOLIC BLOOD PRESSURE: 135 MMHG | TEMPERATURE: 97.4 F | DIASTOLIC BLOOD PRESSURE: 81 MMHG | BODY MASS INDEX: 29.82 KG/M2

## 2017-06-09 DIAGNOSIS — I10 ESSENTIAL HYPERTENSION: Primary | ICD-10-CM

## 2017-06-09 DIAGNOSIS — R51.9 HEADACHE, UNSPECIFIED HEADACHE TYPE: ICD-10-CM

## 2017-06-09 DIAGNOSIS — M25.561 RIGHT KNEE PAIN, UNSPECIFIED CHRONICITY: ICD-10-CM

## 2017-06-09 DIAGNOSIS — R21 RASH: ICD-10-CM

## 2017-06-09 RX ORDER — KETOCONAZOLE 20 MG/G
CREAM TOPICAL DAILY
Qty: 60 G | Refills: 0 | Status: SHIPPED | OUTPATIENT
Start: 2017-06-09 | End: 2017-09-27

## 2017-06-09 RX ORDER — SUMATRIPTAN 50 MG/1
TABLET, FILM COATED ORAL
Refills: 0 | COMMUNITY
Start: 2017-03-02 | End: 2017-06-09

## 2017-06-09 RX ORDER — BUTALBITAL, ACETAMINOPHEN, CAFFEINE AND CODEINE PHOSPHATE 50; 325; 40; 30 MG/1; MG/1; MG/1; MG/1
1 CAPSULE ORAL
Qty: 30 CAP | Refills: 0 | Status: SHIPPED | OUTPATIENT
Start: 2017-06-09 | End: 2017-12-18 | Stop reason: SDUPTHER

## 2017-06-09 NOTE — PROGRESS NOTES
Chief Complaint   Patient presents with    Hypertension     follow-up    Fall     Patient in for HTN follow-up. Patient also had a fall yesterday. He states that his right knee gave out on him. 1. Have you been to the ER, urgent care clinic since your last visit? Hospitalized since your last visit? No    2. Have you seen or consulted any other health care providers outside of the [address] [place] [address] since your last visit? Include any pap smears or colon screening. No     HPI  [employer]. comes in for f/u care. 1) HTN: Patient has hypertension. He is on medication. Blood pressure today is stable. Will continue current treatment plan. 2) Headache: Patient continues to have headache on and off. This is a tension type headache mostly on the right parietal area. Feels like an intense throb and the tightness that lasts for hours then dissipates. Previously used Imitrex but this did not help. Using Fioricet for this headache. He has run out of medication. Would like a refill of the medication. I had done a CT scan of the head in the past and this was essentially stable. Patient has  3) Knee pain: Right knee pain. Has sensation of crepitus in the knee and the discomfort when going up and down stairs. Yesterday he felt like the knee locked up and he fell. He bumped his knee on the ground when he fell. Now having pain but no swelling. We will do an x-ray of the knee. He is still able to flex and extend it though with the discomfort. 4) Rash: Patient had tinea pedis rash. I did give him some clotrimazole cream to apply but this has not helped much. Would like to try different medication. We will give ketoconazole cream to apply and follow-up within a month.       Past Medical History  Past Medical History:   Diagnosis Date    BPH (benign prostatic hyperplasia)     Depression     Hypertension     Irritability and anger        Surgical History  Past Surgical History:   Procedure Laterality Date    HX OTHER SURGICAL  1993    plate placed in head due to injury        Medications  Current Outpatient Prescriptions   Medication Sig Dispense Refill    codeine-butalbital-acetaminophen-caffeine (FIORICET WITH CODEINE) -36-30 mg per capsule Take 1 Cap by mouth every six (6) hours as needed for Headache or Migraine. Max Daily Amount: 4 Caps. 30 Cap 0    ketoconazole (NIZORAL) 2 % topical cream Apply  to affected area daily. 60 g 0    atenolol (TENORMIN) 50 mg tablet Take 1 Tab by mouth daily. 90 Tab 2    hydroCHLOROthiazide (HYDRODIURIL) 25 mg tablet TAKE 1 TABLET BY MOUTH DAILY 90 Tab 1    sertraline (ZOLOFT) 50 mg tablet Take 2 Tabs by mouth daily. 180 Tab 3    atorvastatin (LIPITOR) 40 mg tablet TAKE 1 TABLET BY MOUTH DAILY 90 Tab 2    amLODIPine (NORVASC) 10 mg tablet TK 1 T PO D FOR BP  2    losartan (COZAAR) 100 mg tablet Take 1 Tab by mouth daily. Indications: Hypertension 90 Tab 1       Allergies  No Known Allergies    Family History  Family History   Problem Relation Age of Onset    Diabetes Mother     Heart Disease Father     Diabetes Father        Social History  Social History     Social History    Marital status: SINGLE     Spouse name: N/A    Number of children: N/A    Years of education: N/A     Occupational History    Not on file.      Social History Main Topics    Smoking status: Never Smoker    Smokeless tobacco: Not on file    Alcohol use No    Drug use: Not on file    Sexual activity: Yes     Partners: Female     Other Topics Concern    Not on file     Social History Narrative       Review of Systems  Review of Systems - History obtained from spouse, chart review and the patient  General ROS: negative for  - chills, fatigue OR malaise  Psychological ROS: positive for - behavioral disorder  Ophthalmic ROS: negative for - blurry vision or double vision  ENT ROS: negative  Respiratory ROS: no cough, shortness of breath, or wheezing  Cardiovascular ROS: no chest pain or dyspnea on exertion  Gastrointestinal ROS: no abdominal pain, change in bowel habits, or black or bloody stools  Genito-Urinary ROS: no dysuria, trouble voiding, or hematuria  Musculoskeletal ROS: positive for - joint pain, joint swelling and pain in knee - right  Neurological ROS: positive for - headaches  negative for - seizures or weakness  Dermatological ROS: positive for - pruritus and rash    Vital Signs  Visit Vitals    /81 (BP 1 Location: Left arm, BP Patient Position: Sitting)    Pulse 78    Temp 97.4 °F (36.3 °C) (Oral)    Resp 18    Ht 5' 5\" (1.651 m)    Wt 179 lb (81.2 kg)    SpO2 96%    BMI 29.79 kg/m2         Physical Exam  Physical Examination: General appearance - oriented to person, place, and time, acyanotic, in no respiratory distress and well hydrated  Mental status - alert, oriented to person, place, and time, affect appropriate to mood  Eyes - sclera anicteric  Mouth - mucous membranes moist, pharynx normal without lesions  Neck - supple, no significant adenopathy  Chest - no tachypnea, retractions or cyanosis  Heart - normal rate and regular rhythm  Back exam - limited range of motion  Neurological - alert, oriented, normal speech, no focal findings or movement disorder noted  Musculoskeletal -right knee with the tenderness around the patella margins to palpation. No swelling. Slight crepitus and clicking sensation. Ligamentous exam is stable. Extremities - intact peripheral pulses  Skin -tinea pedis rash on both feet. Diagnostics  Orders Placed This Encounter    XR KNEE RT MIN 4 V     Standing Status:   Future     Number of Occurrences:   1     Standing Expiration Date:   7/9/2018     Order Specific Question:   Reason for Exam     Answer:   knee pain     Order Specific Question:   Is Patient Allergic to Contrast Dye?      Answer:   Unknown    DISCONTD: SUMAtriptan (IMITREX) 50 mg tablet     Sig: TK 1 T PO ONCE PRF MIGRAINE. MAY TAKE ANOTHER T IN 2 H IF HA PERSISTS MAX OF 2 DOSES IN 24 H     Refill:  0    codeine-butalbital-acetaminophen-caffeine (FIORICET WITH CODEINE) -16-30 mg per capsule     Sig: Take 1 Cap by mouth every six (6) hours as needed for Headache or Migraine. Max Daily Amount: 4 Caps. Dispense:  30 Cap     Refill:  0    ketoconazole (NIZORAL) 2 % topical cream     Sig: Apply  to affected area daily. Dispense:  60 g     Refill:  0         Results  Results for orders placed or performed in visit on 02/20/17   AMB POC URINALYSIS DIP STICK AUTO W/O MICRO   Result Value Ref Range    Color (UA POC) Dark Yellow     Clarity (UA POC) Clear     Glucose (UA POC) Negative Negative    Bilirubin (UA POC) Negative Negative    Ketones (UA POC) Negative Negative    Specific gravity (UA POC) 1.020 1.001 - 1.035    Blood (UA POC) Negative Negative    pH (UA POC) 7.0 4.6 - 8.0    Protein (UA POC) Negative Negative mg/dL    Urobilinogen (UA POC) 1 mg/dL 0.2 - 1    Nitrites (UA POC) Negative Negative    Leukocyte esterase (UA POC) Negative Negative       ASSESSMENT and PLAN    ICD-10-CM ICD-9-CM    1. Essential hypertension I10 401.9    2. Headache, unspecified headache type R51 784.0 codeine-butalbital-acetaminophen-caffeine (FIORICET WITH CODEINE) -69-30 mg per capsule   3. Right knee pain, unspecified chronicity M25.561 719.46 XR KNEE RT MIN 4 V   4. Rash R21 782.1 ketoconazole (NIZORAL) 2 % topical cream     reviewed diet, exercise and weight control  reviewed medications and side effects in detail  radiology results and schedule of future radiology studies reviewed with patient      I have discussed the diagnosis with the patient and the intended plan of care as seen in the above orders. The patient has received an after-visit summary and questions were answered concerning future plans. I have discussed medication, side effects, and warnings with the patient in detail. The patient verbalized understanding and is in agreement with the plan of care. The patient will contact the office with any additional concerns.     Ed Javier MD

## 2017-06-09 NOTE — MR AVS SNAPSHOT
Visit Information Date & Time Provider Department Dept. Phone Encounter #  
 6/9/2017  4:30 PM Cami Koenig MD [facility] [phone] [id number] Follow-up Instructions Return in about 1 month (around 7/9/2017), or if symptoms worsen or fail to improve, for headache, rash, HTN. Upcoming Health Maintenance Date Due DTaP/Tdap/Td series (1 - Tdap) 12/12/1970 FOBT Q 1 YEAR AGE 50-75 12/12/1999 ZOSTER VACCINE AGE 60> 12/12/2009 GLAUCOMA SCREENING Q2Y 12/12/2014 INFLUENZA AGE 9 TO ADULT 8/1/2017 Pneumococcal 65+ Low/Medium Risk (2 of 2 - PPSV23) 1/19/2018 MEDICARE YEARLY EXAM 2/21/2018 Allergies as of 6/9/2017  Review Complete On: 6/9/2017 By: Prasanna Singh MD  
 No Known Allergies Current Immunizations  Never Reviewed No immunizations on file. Not reviewed this visit You Were Diagnosed With   
  
 Codes Comments Essential hypertension    -  Primary ICD-10-CM: I10 
ICD-9-CM: 401.9 Headache, unspecified headache type     ICD-10-CM: R51 ICD-9-CM: 784.0 Right knee pain, unspecified chronicity     ICD-10-CM: M25.561 ICD-9-CM: 719.46 Rash     ICD-10-CM: R21 
ICD-9-CM: 782.1 Vitals BP Pulse Temp Resp Height(growth percentile) Weight(growth percentile) 135/81 (BP 1 Location: Left arm, BP Patient Position: Sitting) 78 97.4 °F (36.3 °C) (Oral) 18 5' 5\" (1.651 m) 179 lb (81.2 kg) SpO2 BMI Smoking Status 96% 29.79 kg/m2 Never Smoker BMI and BSA Data Body Mass Index Body Surface Area  
 29.79 kg/m 2 1.93 m 2 Preferred Pharmacy Pharmacy Name Phone [address] [phone] Your Updated Medication List  
  
   
This list is accurate as of: 6/9/17  4:54 PM.  Always use your most recent med list. amLODIPine 10 mg tablet Commonly known as:  Elsa Alvarenga TK 1 T PO D FOR BP  
  
 atenolol 50 mg tablet Commonly known as:  TENORMIN Take 1 Tab by mouth daily. atorvastatin 40 mg tablet Commonly known as:  LIPITOR  
TAKE 1 TABLET BY MOUTH DAILY  
  
 codeine-butalbital-acetaminophen-caffeine -51-30 mg per capsule Commonly known as:  FIORICET WITH CODEINE Take 1 Cap by mouth every six (6) hours as needed for Headache or Migraine. Max Daily Amount: 4 Caps.  
  
 hydroCHLOROthiazide 25 mg tablet Commonly known as:  HYDRODIURIL  
TAKE 1 TABLET BY MOUTH DAILY  
  
 ketoconazole 2 % topical cream  
Commonly known as:  NIZORAL Apply  to affected area daily. losartan 100 mg tablet Commonly known as:  COZAAR Take 1 Tab by mouth daily. Indications: Hypertension  
  
 sertraline 50 mg tablet Commonly known as:  ZOLOFT Take 2 Tabs by mouth daily. Prescriptions Printed Refills  
 codeine-butalbital-acetaminophen-caffeine (FIORICET WITH CODEINE) -64-30 mg per capsule 0 Sig: Take 1 Cap by mouth every six (6) hours as needed for Headache or Migraine. Max Daily Amount: 4 Caps. Class: Print Route: Oral  
  
Prescriptions Sent to Pharmacy Refills  
 ketoconazole (NIZORAL) 2 % topical cream 0 Sig: Apply  to affected area daily. Class: Normal  
 Pharmacy: [facility] [employer] [facility] [place] [address] #: [phone] Route: Topical  
  
Follow-up Instructions Return in about 1 month (around 7/9/2017), or if symptoms worsen or fail to improve, for headache, rash, HTN. To-Do List   
 06/09/2017 Imaging:  XR KNEE RT MIN 4 V Introducing [facility]! o[employer]sa Cord[employer] introduces [employer] patient portal. Now you can access parts of your medical record, email your doctor's office, and request medication refills online. 1. In your internet browser, go to [email or web address] 2. Click on the First Time User? Click Here link in the Sign In box. You will see the New Member Sign Up page. 3. Enter your [employer] Access Code exactly as it appears below. You will not need to use this code after youve completed the sign-up process. If you do not sign up before the expiration date, you must request a new code. · [employer] Access Code: [facility] Expires: 9/7/2017  4:54 PM 
 
4. Enter the last four digits of your Social Security Number (xxxx) and Date of Birth (mm/dd/yyyy) as indicated and click Submit. You will be taken to the next sign-up page. 5. Create a [employer] ID. This will be your [employer] login ID and cannot be changed, so think of one that is secure and easy to remember. 6. Create a [employer] password. You can change your password at any time. 7. Enter your Password Reset Question and Answer. This can be used at a later time if you forget your password. 8. Enter your e-mail address. You will receive e-mail notification when new information is available in [address] 9. Click Sign Up. You can now view and download portions of your medical record. 10. Click the Download Summary menu link to download a portable copy of your medical information. If you have questions, please visit the Frequently Asked Questions section of the [employer] website. Remember, [employer] is NOT to be used for urgent needs. For medical emergencies, dial 911. Now available from your iPhone and Android! Please provide this summary of care documentation to your next provider. Your primary care clinician is listed as Cami Mcgee. If you have any questions after today's visit, please call [phone].

## 2017-07-02 DIAGNOSIS — I10 ESSENTIAL HYPERTENSION: ICD-10-CM

## 2017-07-03 RX ORDER — HYDROCHLOROTHIAZIDE 25 MG/1
TABLET ORAL
Qty: 60 TAB | Refills: 0 | Status: SHIPPED | OUTPATIENT
Start: 2017-07-03 | End: 2017-09-18 | Stop reason: SDUPTHER

## 2017-07-07 ENCOUNTER — OFFICE VISIT (OUTPATIENT)
Dept: FAMILY MEDICINE CLINIC | Age: 67
End: 2017-07-07

## 2017-07-07 VITALS
HEIGHT: 65 IN | HEART RATE: 53 BPM | BODY MASS INDEX: 29.82 KG/M2 | RESPIRATION RATE: 20 BRPM | TEMPERATURE: 96.6 F | SYSTOLIC BLOOD PRESSURE: 138 MMHG | DIASTOLIC BLOOD PRESSURE: 76 MMHG | OXYGEN SATURATION: 97 % | WEIGHT: 179 LBS

## 2017-07-07 DIAGNOSIS — G89.29 CHRONIC PAIN OF RIGHT KNEE: ICD-10-CM

## 2017-07-07 DIAGNOSIS — T14.8XXA ABRASION: ICD-10-CM

## 2017-07-07 DIAGNOSIS — Z23 ENCOUNTER FOR IMMUNIZATION: ICD-10-CM

## 2017-07-07 DIAGNOSIS — F39 MOOD DISORDER (HCC): ICD-10-CM

## 2017-07-07 DIAGNOSIS — I10 ESSENTIAL HYPERTENSION: Primary | ICD-10-CM

## 2017-07-07 DIAGNOSIS — M25.561 CHRONIC PAIN OF RIGHT KNEE: ICD-10-CM

## 2017-07-07 NOTE — PATIENT INSTRUCTIONS

## 2017-07-07 NOTE — PROGRESS NOTES
Chief Complaint   Patient presents with    Follow Up Chronic Condition     pt here for follow chronic conditions HTN     1. Have you been to the ER, urgent care clinic since your last visit? Hospitalized since your last visit? No    2. Have you seen or consulted any other health care providers outside of the [address] [place] [address] since your last visit? Include any pap smears or colon screening. No     HPI  [employer]. comes in for f/u care. 1) HTN: Patient comes in for follow-up care. He is accompanied by the wife. Has a history of hypertension. Blood pressure has been stable on medication. Will continue with the current medication plan. 2) Knee pain: Patient has right knee pain and at times this gives way. He does get discomfort when he is trying to flex the knee. Denies trauma or swelling. X-ray done did show mild degenerative disease. Will refer to orthopedic clinic for further evaluation and advice. Given the catching sensation he may have to have an MRI of the knee done. 3) Mood disorder: Patient has a history of anger management issues. These have been stable lately. He takes his Zoloft 100 mg daily and that does help. Will continue this medication. 4) Visual changes: patient has a h/o diminished visual acuity. He has been referred to the ophthalmologist but he is yet to go. He plans to go after he has had his colonoscopy done. 5) Abrasion: patient has abrasion left arm. Did get puncture wound in the area a few days ago. Unsure what caused this. Will give tetanus vaccine.       Past Medical History  Past Medical History:   Diagnosis Date    BPH (benign prostatic hyperplasia)     Depression     Hypertension     Irritability and anger        Surgical History  Past Surgical History:   Procedure Laterality Date    HX OTHER SURGICAL  1993    plate placed in head due to injury        Medications  Current Outpatient Prescriptions   Medication Sig Dispense Refill    hydroCHLOROthiazide (HYDRODIURIL) 25 mg tablet TAKE 1 TABLET BY MOUTH DAILY 60 Tab 0    codeine-butalbital-acetaminophen-caffeine (FIORICET WITH CODEINE) -87-30 mg per capsule Take 1 Cap by mouth every six (6) hours as needed for Headache or Migraine. Max Daily Amount: 4 Caps. 30 Cap 0    ketoconazole (NIZORAL) 2 % topical cream Apply  to affected area daily. 60 g 0    atenolol (TENORMIN) 50 mg tablet Take 1 Tab by mouth daily. 90 Tab 2    hydroCHLOROthiazide (HYDRODIURIL) 25 mg tablet TAKE 1 TABLET BY MOUTH DAILY 90 Tab 1    sertraline (ZOLOFT) 50 mg tablet Take 2 Tabs by mouth daily. 180 Tab 3    atorvastatin (LIPITOR) 40 mg tablet TAKE 1 TABLET BY MOUTH DAILY 90 Tab 2    amLODIPine (NORVASC) 10 mg tablet TK 1 T PO D FOR BP  2    losartan (COZAAR) 100 mg tablet Take 1 Tab by mouth daily. Indications: Hypertension 90 Tab 1       Allergies  No Known Allergies    Family History  Family History   Problem Relation Age of Onset    Diabetes Mother     Heart Disease Father     Diabetes Father        Social History  Social History     Social History    Marital status: SINGLE     Spouse name: N/A    Number of children: N/A    Years of education: N/A     Occupational History    Not on file.      Social History Main Topics    Smoking status: Never Smoker    Smokeless tobacco: Never Used    Alcohol use No    Drug use: Not on file    Sexual activity: Yes     Partners: Female     Other Topics Concern    Not on file     Social History Narrative       Review of Systems  Review of Systems - History obtained from chart review and the patient  General ROS: negative for - chills, fatigue, fever or malaise  Psychological ROS: positive for - mood swings  Ophthalmic ROS: positive for - decreased vision  ENT ROS: negative  Allergy and Immunology ROS: negative  Hematological and Lymphatic ROS: negative  Respiratory ROS: no cough, shortness of breath, or wheezing  Cardiovascular ROS: no chest pain or dyspnea on exertion  Gastrointestinal ROS: no abdominal pain, change in bowel habits, or black or bloody stools  Genito-Urinary ROS: no dysuria, trouble voiding, or hematuria  Musculoskeletal ROS: positive for - joint pain and pain in knee - right  Neurological ROS: positive for - headaches and numbness/tingling    Vital Signs  Visit Vitals    /76 (BP 1 Location: Left arm, BP Patient Position: Sitting)    Pulse (!) 53    Temp 96.6 °F (35.9 °C) (Oral)    Resp 20    Ht 5' 5\" (1.651 m)    Wt 179 lb (81.2 kg)    SpO2 97%    BMI 29.79 kg/m2         Physical Exam  Physical Examination: General appearance - oriented to person, place, and time and acyanotic, in no respiratory distress  Mental status - affect appropriate to mood  Eyes - sclera anicteric  Mouth - mucous membranes moist, pharynx normal without lesions  Neck - supple, no significant adenopathy  Chest - clear to auscultation, no wheezes, rales or rhonchi, symmetric air entry, no tachypnea, retractions or cyanosis  Heart - normal rate, regular rhythm, normal S1, S2, no murmurs, rubs, clicks or gallops  Neurological - motor and sensory grossly normal bilaterally  Musculoskeletal - right knee without swelling or tenderness to palpation, limited flexion due to discomfort  Extremities - no pedal edema noted, intact peripheral pulses  Dermatology: abrasion left arm.     Diagnostics  Orders Placed This Encounter    REFERRAL TO ORTHOPEDICS     Referral Priority:   Routine     Referral Type:   Consultation     Referral Reason:   Specialty Services Required         Results  Results for orders placed or performed in visit on 02/20/17   AMB POC URINALYSIS DIP STICK AUTO W/O MICRO   Result Value Ref Range    Color (UA POC) Dark Yellow     Clarity (UA POC) Clear     Glucose (UA POC) Negative Negative    Bilirubin (UA POC) Negative Negative    Ketones (UA POC) Negative Negative    Specific gravity (UA POC) 1.020 1.001 - 1.035    Blood (UA POC) Negative Negative    pH (UA POC) 7.0 4.6 - 8.0    Protein (UA POC) Negative Negative mg/dL    Urobilinogen (UA POC) 1 mg/dL 0.2 - 1    Nitrites (UA POC) Negative Negative    Leukocyte esterase (UA POC) Negative Negative     ASSESSMENT and PLAN    ICD-10-CM ICD-9-CM    1. Essential hypertension I10 401.9    2. Chronic pain of right knee M25.561 719.46 REFERRAL TO ORTHOPEDICS    G89.29 338.29    3. Abrasion T14.8 919.0    4. [facility] ([facility] F39 296.90      lab results and schedule of future lab studies reviewed with patient  reviewed diet, exercise and weight control  reviewed medications and side effects in detail  radiology results and schedule of future radiology studies reviewed with patient      I have discussed the diagnosis with the patient and the intended plan of care as seen in the above orders. The patient has received an after-visit summary and questions were answered concerning future plans. I have discussed medication, side effects, and warnings with the patient in detail. The patient verbalized understanding and is in agreement with the plan of care. The patient will contact the office with any additional concerns.     Camille Calvillo MD

## 2017-07-07 NOTE — MR AVS SNAPSHOT
Visit Information Date & Time Provider Department Dept. Phone Encounter #  
 7/7/2017  3:45 PM Daniel Luong. [address] [place]ustin Deal [phone] [id number] Follow-up Instructions Return if symptoms worsen or fail to improve, for HTN, Knee pain. Upcoming Health Maintenance Date Due DTaP/Tdap/Td series (1 - Tdap) 12/12/1970 FOBT Q 1 YEAR AGE 50-75 12/12/1999 ZOSTER VACCINE AGE 60> 12/12/2009 GLAUCOMA SCREENING Q2Y 12/12/2014 INFLUENZA AGE 9 TO ADULT 8/1/2017 Pneumococcal 65+ Low/Medium Risk (2 of 2 - PPSV23) 1/19/2018 MEDICARE YEARLY EXAM 2/21/2018 Allergies as of 7/7/2017  Review Complete On: 7/7/2017 By: Verona Escobar MD  
 No Known Allergies Current Immunizations  Never Reviewed No immunizations on file. Not reviewed this visit You Were Diagnosed With   
  
 Codes Comments Essential hypertension    -  Primary ICD-10-CM: I10 
ICD-9-CM: 401.9 Chronic pain of right knee     ICD-10-CM: M25.561, G89.29 ICD-9-CM: 719.46, 338.29 Abrasion     ICD-10-CM: T14.8 ICD-9-CM: 919.0 Mood disorder ([facility] [address]     ICD-10-CM: F39 
ICD-9-CM: 296.90 Vitals BP Pulse Temp Resp Height(growth percentile) Weight(growth percentile) 138/76 (BP 1 Location: Left arm, BP Patient Position: Sitting) (!) 53 96.6 °F (35.9 °C) (Oral) 20 5' 5\" (1.651 m) 179 lb (81.2 kg) SpO2 BMI Smoking Status 97% 29.79 kg/m2 Never Smoker Vitals History BMI and BSA Data Body Mass Index Body Surface Area  
 29.79 kg/m 2 1.93 m 2 Preferred Pharmacy Pharmacy Name Phone [facility], [address] [place] [address] [phone] Your Updated Medication List  
  
   
This list is accurate as of: 7/7/17  4:03 PM.  Always use your most recent med list. amLODIPine 10 mg tablet Commonly known as:  Levy Pop TK 1 T PO D FOR BP  
  
 atenolol 50 mg tablet Commonly known as:  TENORMIN Take 1 Tab by mouth daily. atorvastatin 40 mg tablet Commonly known as:  LIPITOR  
TAKE 1 TABLET BY MOUTH DAILY  
  
 codeine-butalbital-acetaminophen-caffeine -23-30 mg per capsule Commonly known as:  FIORICET WITH CODEINE Take 1 Cap by mouth every six (6) hours as needed for Headache or Migraine. Max Daily Amount: 4 Caps. * hydroCHLOROthiazide 25 mg tablet Commonly known as:  HYDRODIURIL  
TAKE 1 TABLET BY MOUTH DAILY * hydroCHLOROthiazide 25 mg tablet Commonly known as:  HYDRODIURIL  
TAKE 1 TABLET BY MOUTH DAILY  
  
 ketoconazole 2 % topical cream  
Commonly known as:  NIZORAL Apply  to affected area daily. losartan 100 mg tablet Commonly known as:  COZAAR Take 1 Tab by mouth daily. Indications: Hypertension  
  
 sertraline 50 mg tablet Commonly known as:  ZOLOFT Take 2 Tabs by mouth daily. * Notice: This list has 2 medication(s) that are the same as other medications prescribed for you. Read the directions carefully, and ask your doctor or other care provider to review them with you. We Performed the Following REFERRAL TO ORTHOPEDICS [HIZ173 Custom] Comments:  
 Please evaluate patient for chronic right knee pain. Follow-up Instructions Return if symptoms worsen or fail to improve, for HTN, Knee pain. Referral Information Referral ID Referred By Referred To  
  
 [id number] [facility]AMPO Not Available Visits Status Start Date End Date 1 New Request 7/7/17 7/7/18 If your referral has a status of pending review or denied, additional information will be sent to support the outcome of this decision. Patient Instructions High Blood Pressure: Care Instructions Your Care Instructions If your blood pressure is usually above 140/90, you have high blood pressure, or hypertension.  That means the top number is 140 or higher or the bottom number is 90 or higher, or both. Despite what a lot of people think, high blood pressure usually doesn't cause headaches or make you feel dizzy or lightheaded. It usually has no symptoms. But it does increase your risk for heart attack, stroke, and kidney or eye damage. The higher your blood pressure, the more your risk increases. Your doctor will give you a goal for your blood pressure. Your goal will be based on your health and your age. An example of a goal is to keep your blood pressure below 140/90. Lifestyle changes, such as eating healthy and being active, are always important to help lower blood pressure. You might also take medicine to reach your blood pressure goal. 
Follow-up care is a key part of your treatment and safety. Be sure to make and go to all appointments, and call your doctor if you are having problems. It's also a good idea to know your test results and keep a list of the medicines you take. How can you care for yourself at home? Medical treatment · If you stop taking your medicine, your blood pressure will go back up. You may take one or more types of medicine to lower your blood pressure. Be safe with medicines. Take your medicine exactly as prescribed. Call your doctor if you think you are having a problem with your medicine. · Talk to your doctor before you start taking aspirin every day. Aspirin can help certain people lower their risk of a heart attack or stroke. But taking aspirin isn't right for everyone, because it can cause serious bleeding. · See your doctor regularly. You may need to see the doctor more often at first or until your blood pressure comes down. · If you are taking blood pressure medicine, talk to your doctor before you take decongestants or anti-inflammatory medicine, such as ibuprofen. Some of these medicines can raise blood pressure. · Learn how to check your blood pressure at home. Lifestyle changes · Stay at a healthy weight. This is especially important if you put on weight around the waist. Losing even 10 pounds can help you lower your blood pressure. · If your doctor recommends it, get more exercise. Walking is a good choice. Bit by bit, increase the amount you walk every day. Try for at least 30 minutes on most days of the week. You also may want to swim, bike, or do other activities. · Avoid or limit alcohol. Talk to your doctor about whether you can drink any alcohol. · Try to limit how much sodium you eat to less than 2,300 milligrams (mg) a day. Your doctor may ask you to try to eat less than 1,500 mg a day. · Eat plenty of fruits (such as bananas and oranges), vegetables, legumes, whole grains, and low-fat dairy products. · Lower the amount of saturated fat in your diet. Saturated fat is found in animal products such as milk, cheese, and meat. Limiting these foods may help you lose weight and also lower your risk for heart disease. · Do not smoke. Smoking increases your risk for heart attack and stroke. If you need help quitting, talk to your doctor about stop-smoking programs and medicines. These can increase your chances of quitting for good. When should you call for help? Call 911 anytime you think you may need emergency care. This may mean having symptoms that suggest that your blood pressure is causing a serious heart or blood vessel problem. Your blood pressure may be over 180/110. For example, call 911 if: 
· You have symptoms of a heart attack. These may include: ¨ Chest pain or pressure, or a strange feeling in the chest. 
¨ Sweating. ¨ Shortness of breath. ¨ Nausea or vomiting. ¨ Pain, pressure, or a strange feeling in the back, neck, jaw, or upper belly or in one or both shoulders or arms. ¨ Lightheadedness or sudden weakness. ¨ A fast or irregular heartbeat. · You have symptoms of a stroke. These may include: ¨ Sudden numbness, tingling, weakness, or loss of movement in your face, arm, or leg, especially on only one side of your body. ¨ Sudden vision changes. ¨ Sudden trouble speaking. ¨ Sudden confusion or trouble understanding simple statements. ¨ Sudden problems with walking or balance. ¨ A sudden, severe headache that is different from past headaches. · You have severe back or belly pain. Do not wait until your blood pressure comes down on its own. Get help right away. Call your doctor now or seek immediate care if: 
· Your blood pressure is much higher than normal (such as 180/110 or higher), but you don't have symptoms. · You think high blood pressure is causing symptoms, such as: ¨ Severe headache. ¨ Blurry vision. Watch closely for changes in your health, and be sure to contact your doctor if: 
· Your blood pressure measures 140/90 or higher at least 2 times. That means the top number is 140 or higher or the bottom number is 90 or higher, or both. · You think you may be having side effects from your blood pressure medicine. · Your blood pressure is usually normal, but it goes above normal at least 2 times. Where can you learn more? Go to http://lavonne-frances.info/. Enter L108 in the search box to learn more about \"High Blood Pressure: Care Instructions. \" Current as of: August 8, 2016 Content Version: 11.3 © 7349-8519 [employer]. Care instructions adapted under license by [employer] (which disclaims liability or warranty for this information). If you have questions about a medical condition or this instruction, always ask your healthcare professional. [place] [address] any warranty or liability for your use of this information. Introducing [facility]! Kika Louise introduces Retrace patient portal. Now you can access parts of your medical record, email your doctor's office, and request medication refills online. 1. In your internet browser, go to [email or web address]t 2. Click on the First Time User? Click Here link in the Sign In box. You will see the New Member Sign Up page. 3. Enter your IntraStage Access Code exactly as it appears below. You will not need to use this code after youve completed the sign-up process. If you do not sign up before the expiration date, you must request a new code. · [email or web address] Access Code: [facility] Expires: 9/7/2017  4:54 PM 
 
4. Enter the last four digits of your Social Security Number (xxxx) and Date of Birth (mm/dd/yyyy) as indicated and click Submit. You will be taken to the next sign-up page. 5. Create a [email or web address] ID. This will be your IntraStage login ID and cannot be changed, so think of one that is secure and easy to remember. 6. Create a IntraStage password. You can change your password at any time. 7. Enter your Password Reset Question and Answer. This can be used at a later time if you forget your password. 8. Enter your e-mail address. You will receive e-mail notification when new information is available in [address] 9. Click Sign Up. You can now view and download portions of your medical record. 10. Click the Download Summary menu link to download a portable copy of your medical information. If you have questions, please visit the Frequently Asked Questions section of the IntraStage website. Remember, IntraStage is NOT to be used for urgent needs. For medical emergencies, dial 911. Now available from your iPhone and Android! Please provide this summary of care documentation to your next provider. Your primary care clinician is listed as Cami Mcgee. If you have any questions after today's visit, please call [phone].

## 2017-07-18 ENCOUNTER — ANESTHESIA EVENT (OUTPATIENT)
Dept: ENDOSCOPY | Age: 67
End: 2017-07-18
Payer: MEDICARE

## 2017-07-19 ENCOUNTER — HOSPITAL ENCOUNTER (OUTPATIENT)
Age: 67
Setting detail: OUTPATIENT SURGERY
Discharge: HOME | End: 2017-07-19
Attending: COLON & RECTAL SURGERY | Admitting: COLON & RECTAL SURGERY
Payer: MEDICARE

## 2017-07-19 ENCOUNTER — ANESTHESIA (OUTPATIENT)
Dept: ENDOSCOPY | Age: 67
End: 2017-07-19
Payer: MEDICARE

## 2017-07-19 VITALS
SYSTOLIC BLOOD PRESSURE: 140 MMHG | RESPIRATION RATE: 16 BRPM | HEIGHT: 65 IN | WEIGHT: 179 LBS | BODY MASS INDEX: 29.82 KG/M2 | HEART RATE: 60 BPM | DIASTOLIC BLOOD PRESSURE: 74 MMHG | TEMPERATURE: 97.4 F | OXYGEN SATURATION: 99 %

## 2017-07-19 PROCEDURE — 76060000032 HC ANESTHESIA 0.5 TO 1 HR: Performed by: COLON & RECTAL SURGERY

## 2017-07-19 PROCEDURE — 77030011640 HC PAD GRND REM COVD -A: Performed by: COLON & RECTAL SURGERY

## 2017-07-19 PROCEDURE — 74011250636 HC RX REV CODE- 250/636

## 2017-07-19 PROCEDURE — 77030009426 HC FCPS BIOP ENDOSC BSC -B: Performed by: COLON & RECTAL SURGERY

## 2017-07-19 PROCEDURE — 88305 TISSUE EXAM BY PATHOLOGIST: CPT | Performed by: COLON & RECTAL SURGERY

## 2017-07-19 PROCEDURE — 76040000019: Performed by: COLON & RECTAL SURGERY

## 2017-07-19 PROCEDURE — 74011250636 HC RX REV CODE- 250/636: Performed by: NURSE ANESTHETIST, CERTIFIED REGISTERED

## 2017-07-19 RX ORDER — SUMATRIPTAN 25 MG/1
25 TABLET, FILM COATED ORAL
COMMUNITY
End: 2018-02-20

## 2017-07-19 RX ORDER — CLOTRIMAZOLE AND BETAMETHASONE DIPROPIONATE 10; .64 MG/G; MG/G
CREAM TOPICAL 2 TIMES DAILY
COMMUNITY
End: 2017-09-27

## 2017-07-19 RX ORDER — SODIUM CHLORIDE, SODIUM LACTATE, POTASSIUM CHLORIDE, CALCIUM CHLORIDE 600; 310; 30; 20 MG/100ML; MG/100ML; MG/100ML; MG/100ML
75 INJECTION, SOLUTION INTRAVENOUS CONTINUOUS
Status: DISCONTINUED | OUTPATIENT
Start: 2017-07-19 | End: 2017-07-19 | Stop reason: HOSPADM

## 2017-07-19 RX ORDER — PROPOFOL 10 MG/ML
INJECTION, EMULSION INTRAVENOUS AS NEEDED
Status: DISCONTINUED | OUTPATIENT
Start: 2017-07-19 | End: 2017-07-19 | Stop reason: HOSPADM

## 2017-07-19 RX ADMIN — PROPOFOL 100 MG: 10 INJECTION, EMULSION INTRAVENOUS at 12:43

## 2017-07-19 RX ADMIN — PROPOFOL 30 MG: 10 INJECTION, EMULSION INTRAVENOUS at 12:55

## 2017-07-19 RX ADMIN — PROPOFOL 50 MG: 10 INJECTION, EMULSION INTRAVENOUS at 12:46

## 2017-07-19 RX ADMIN — PROPOFOL 50 MG: 10 INJECTION, EMULSION INTRAVENOUS at 12:49

## 2017-07-19 RX ADMIN — SODIUM CHLORIDE, SODIUM LACTATE, POTASSIUM CHLORIDE, AND CALCIUM CHLORIDE 75 ML/HR: 600; 310; 30; 20 INJECTION, SOLUTION INTRAVENOUS at 12:27

## 2017-07-19 RX ADMIN — PROPOFOL 30 MG: 10 INJECTION, EMULSION INTRAVENOUS at 12:58

## 2017-07-19 RX ADMIN — PROPOFOL 30 MG: 10 INJECTION, EMULSION INTRAVENOUS at 12:52

## 2017-07-19 NOTE — DISCHARGE INSTRUCTIONS
Colonoscopy: What to Expect at [address] [place] [address]  After you have a colonoscopy, you will stay at the clinic for 1 to 2 hours until the medicines wear off. Then you can go home. But you will need to arrange for a ride. Your doctor will tell you when you can eat and do your other usual activities. Your doctor will talk to you about when you will need your next colonoscopy. Your doctor can help you decide how often you need to be checked. This will depend on the results of your test and your risk for colorectal cancer. After the test, you may be bloated or have gas pains. You may need to pass gas. If a biopsy was done or a polyp was removed, you may have streaks of blood in your stool (feces) for a few days. This care sheet gives you a general idea about how long it will take for you to recover. But each person recovers at a different pace. Follow the steps below to get better as quickly as possible. How can you care for yourself at home? Activity  · Rest when you feel tired. · You can do your normal activities when it feels okay to do so. Diet  · Follow your doctor's directions for eating. · Unless your doctor has told you not to, drink plenty of fluids. This helps to replace the fluids that were lost during the colon prep. · Do not drink alcohol. Medicines  · If polyps were removed or a biopsy was done during the test, your doctor may tell you not to take aspirin or other anti-inflammatory medicines for a few days. These include ibuprofen (Advil, Motrin) and naproxen (Aleve). Other instructions  · For your safety, do not drive or operate machinery until the medicine wears off and you can think clearly. Your doctor may tell you not to drive or operate machinery until the day after your test.  · Do not sign legal documents or make major decisions until the medicine wears off and you can think clearly. The anesthesia can make it hard for you to fully understand what you are agreeing to.   Follow-up care is a key part of your treatment and safety. Be sure to make and go to all appointments, and call your doctor if you are having problems. It's also a good idea to know your test results and keep a list of the medicines you take. When should you call for help? Call 911 anytime you think you may need emergency care. For example, call if:  · You passed out (lost consciousness). · You pass maroon or bloody stools. · You have severe belly pain. Call your doctor now or seek immediate medical care if:  · Your stools are black and tarlike. · Your stools have streaks of blood, but you did not have a biopsy or any polyps removed. · You have belly pain, or your belly is swollen and firm. · You vomit. · You have a fever. · You are very dizzy. Watch closely for changes in your health, and be sure to contact your doctor if you have any problems. Where can you learn more? Go to TheySay.be  Enter E264 in the search box to learn more about \"Colonoscopy: What to Expect at Home. \"   © 8878-9706 Healthwise, Incorporated. Care instructions adapted under license by [facility] (which disclaims liability or warranty for this information). This care instruction is for use with your licensed healthcare professional. If you have questions about a medical condition or this instruction, always ask your healthcare professional. [address] any warranty or liability for your use of this information. Content Version: 25.3.411348; Current as of: November 14, 2014       Colon Polyps: Care Instructions  Your Care Instructions    Colon polyps are growths in the colon or the rectum. The cause of most colon polyps is not known, and most people who get them do not have any problems. But a certain kind can turn into cancer. For this reason, regular testing for colon polyps is important for people age 48 and older and anyone who has an increased risk for colon cancer.   Polyps are usually found through routine colon cancer screening tests. Although most colon polyps are not cancerous, they are usually removed and then tested for cancer. Screening for colon cancer saves lives because the cancer can usually be cured if it is caught early. If you have a polyp that is the type that can turn into cancer, you may need more tests to examine your entire colon. The doctor will remove any other polyps that he or she finds, and you will be tested more often. Follow-up care is a key part of your treatment and safety. Be sure to make and go to all appointments, and call your doctor if you are having problems. It's also a good idea to know your test results and keep a list of the medicines you take. How can you care for yourself at home? Regular exams to look for colon polyps are the best way to prevent polyps from turning into colon cancer. These can include stool tests, sigmoidoscopy, colonoscopy, and CT colonography. Talk with your doctor about a testing schedule that is right for you. To prevent polyps  There is no home treatment that can prevent colon polyps. But these steps may help lower your risk for cancer. · Stay active. Being active can help you get to and stay at a healthy weight. Try to exercise on most days of the week. Walking is a good choice. · Eat well. Choose a variety of vegetables, fruits, legumes (such as peas and beans), fish, poultry, and whole grains. · Do not smoke. If you need help quitting, talk to your doctor about stop-smoking programs and medicines. These can increase your chances of quitting for good. · If you drink alcohol, limit how much you drink. Limit alcohol to 2 drinks a day for men and 1 drink a day for women. When should you call for help? Call your doctor now or seek immediate medical care if:  · You have severe belly pain. · Your stools are maroon or very bloody.   Watch closely for changes in your health, and be sure to contact your doctor if:  · You have a fever.  · You have nausea or vomiting. · You have a change in bowel habits (new constipation or diarrhea). · Your symptoms get worse or are not improving as expected. Where can you learn more? Go to http://lavonne-frances.info/. Enter 95 [id number] in the search box to learn more about \"Colon Polyps: Care Instructions. \"  Current as of: May 5, 2017  Content Version: 11.3  © 7557-8916 [employer]. Care instructions adapted under license by [employer] (which disclaims liability or warranty for this information). If you have questions about a medical condition or this instruction, always ask your healthcare professional. [place] [address] any warranty or liability for your use of this information. DISCHARGE SUMMARY from Nurse     POST-PROCEDURE INSTRUCTIONS:    Call your Physician if you:  ? Observe any excess bleeding. ? Develop a temperature over 100.5o F.  ? Experience abdominal, shoulder or chest pain. ? Notice any signs of decreased circulation or nerve impairment to an extremity such as a change in color, persistent numbness, tingling, coldness or increase in pain. ? Vomit blood or you have nausea and vomiting lasting longer than 4 hours. ? Are unable to take medications. ? Are unable to urinate within 8 hours after discharge following general anesthesia or intravenous sedation. For the next 24 hours after receiving general anesthesia or intravenous sedation, or while taking prescription Narcotics, limit your activities:  ? Do NOT drive a motor vehicle, operate hazard machinery or power tools, or perform tasks that require coordination. The medication you received during your procedure may have some effect on your mental awareness. ? Do NOT make important personal or business decisions. The medication you received during your procedure may have some effect on your mental awareness. ? Do NOT drink alcoholic beverages.   These drinks do not mix well with the medications that have been given to you. ? Upon discharge from the hospital, you must be accompanied by a responsible adult. ? Resume your diet as directed by your physician. ? Resume medications as your physician has prescribed. ? Please give a list of your current medications to your Primary Care Provider. ? Please update this list whenever your medications are discontinued, doses are changed, or new medications (including over-the-counter products) are added. ? Please carry medication information at all times in case of emergency situations. These are general instructions for a healthy lifestyle:    No smoking/ No tobacco products/ Avoid exposure to second hand smoke.  Surgeon General's Warning:  Quitting smoking now greatly reduces serious risk to your health. Obesity, smoking, and a sedentary lifestyle greatly increase your risk for illness.  A healthy diet, regular physical exercise & weight monitoring are important for maintaining a healthy lifestyle   You may be retaining fluid if you have a history of heart failure or if you experience any of the following symptoms:  Weight gain of 3 pounds or more overnight or 5 pounds in a week, increased swelling in our hands or feet or shortness of breath while lying flat in bed. Please call your doctor as soon as you notice any of these symptoms; do not wait until your next office visit. Recognize signs and symptoms of STROKE:  F  -  Face looks uneven  A  -  Arms unable to move or move unevenly  S  -  Speech slurred or non-existent  T  -  Time to call 911 - as soon as signs and symptoms begin - DO NOT go back to bed or wait to see If you get better - TIME IS BRAIN. Colorectal Screening   Colorectal cancer almost always develops from precancerous polyps (abnormal growths) in the colon or rectum. Screening tests can find precancerous polyps, so that they can be removed before they turn into cancer.  Screening tests can also find colorectal cancer early, when treatment works best.  Cesia Cortes Speak with your physician about when you should begin screening and how often you should be tested. Additional Information    If you have questions, please call [phone]. Remember, MyChart is NOT to be used for urgent needs. For medical emergencies, dial 911. Educational references and/or instructions provided during this visit included:    Colon Polyps      APPOINTMENTS:    Please make a follow-up appointment with your physician. Discharge information has been reviewed with the patient. The patient verbalized understanding.

## 2017-07-19 NOTE — ANESTHESIA PREPROCEDURE EVALUATION
Anesthetic History   No history of anesthetic complications       Comments:   Risk Factors for Postoperative nausea/vomiting:       History of postoperative nausea/vomiting? NO       Female? NO       Motion sickness? NO       Intended opioid administration for postoperative analgesia? NO      Smoking Abstinence  Current Smoker? NO  Elective Surgery? YES  Seen preoperatively by anesthesiologist or proxy prior to day of surgery? YES  Pt abstained from smoking 24 hours prior to anesthesia?  YES     Review of Systems / Medical History  Patient summary reviewed and pertinent labs reviewed    Pulmonary  Within defined limits                 Neuro/Psych         Psychiatric history     Cardiovascular    Hypertension: poorly controlled          Hyperlipidemia    Exercise tolerance: >4 METS     GI/Hepatic/Renal  Within defined limits              Endo/Other  Within defined limits           Other Findings            Physical Exam    Airway  Mallampati: II  TM Distance: 4 - 6 cm  Neck ROM: normal range of motion        Cardiovascular  Regular rate and rhythm,  S1 and S2 normal,  no murmur, click, rub, or gallop  Rhythm: regular  Rate: normal         Dental    Dentition: Poor dentition     Pulmonary  Breath sounds clear to auscultation               Abdominal  GI exam deferred       Other Findings            Anesthetic Plan    ASA: 2  Anesthesia type: MAC          Induction: Intravenous  Anesthetic plan and risks discussed with: Patient

## 2017-07-19 NOTE — H&P
HPI: Juju Riley is a 78 y.o. male presenting with chief complain of need for colorectal cancer screening    Past Medical History:   Diagnosis Date    BPH (benign prostatic hyperplasia)     Depression     Hypertension     Irritability and anger        Past Surgical History:   Procedure Laterality Date    HX OTHER SURGICAL  1993    plate placed in head due to injury       Family History   Problem Relation Age of Onset    Diabetes Mother     Heart Disease Father     Diabetes Father        Social History     Social History    Marital status: SINGLE     Spouse name: N/A    Number of children: N/A    Years of education: N/A     Social History Main Topics    Smoking status: Never Smoker    Smokeless tobacco: Never Used    Alcohol use No    Drug use: No    Sexual activity: Yes     Partners: Female     Other Topics Concern    None     Social History Narrative       Review of Systems - negative    Outpatient Prescriptions Marked as Taking for the 7/19/17 encounter [facility] Encounter)   Medication Sig Dispense Refill    SUMAtriptan (IMITREX) 25 mg tablet Take 25 mg by mouth once as needed for Migraine.  clotrimazole-betamethasone (LOTRISONE) topical cream Apply  to affected area two (2) times a day.  codeine-butalbital-acetaminophen-caffeine (FIORICET WITH CODEINE) -65-30 mg per capsule Take 1 Cap by mouth every six (6) hours as needed for Headache or Migraine. Max Daily Amount: 4 Caps. 30 Cap 0    atenolol (TENORMIN) 50 mg tablet Take 1 Tab by mouth daily. 90 Tab 2    hydroCHLOROthiazide (HYDRODIURIL) 25 mg tablet TAKE 1 TABLET BY MOUTH DAILY 90 Tab 1    sertraline (ZOLOFT) 50 mg tablet Take 2 Tabs by mouth daily. 180 Tab 3    atorvastatin (LIPITOR) 40 mg tablet TAKE 1 TABLET BY MOUTH DAILY 90 Tab 2    amLODIPine (NORVASC) 10 mg tablet TK 1 T PO D FOR BP  2    losartan (COZAAR) 100 mg tablet Take 1 Tab by mouth daily.  Indications: Hypertension 90 Tab 1       No Known Allergies    Vitals:    07/12/17 1609 07/19/17 1208   BP:  (!) 152/94   Pulse:  73   Resp:  16   Temp:  97.4 °F (36.3 °C)   SpO2:  100%   Weight: 81.2 kg (179 lb) 81.2 kg (179 lb)   Height: 5' 5\" (1.651 m) 5' 5\" (1.651 m)       Physical Exam   Constitutional: He appears well-developed and well-nourished. HENT:   Head: Normocephalic and atraumatic. Eyes: Conjunctivae and EOM are normal.   Abdominal: Soft. He exhibits no distension and no mass. There is no tenderness. Musculoskeletal: Normal range of motion. Lymphadenopathy:     He has no cervical adenopathy. Right: No inguinal adenopathy present. Left: No inguinal adenopathy present. Neurological: He exhibits normal muscle tone. Skin: Skin is warm and dry. Psychiatric: He has a normal mood and affect. His speech is normal.       Assessment / Plan    colonoscopy    The diagnoses and plan were discussed with the patient. All questions answered. Plan of care agreed to by all concerned.

## 2017-07-19 NOTE — ANESTHESIA POSTPROCEDURE EVALUATION
Post-Anesthesia Evaluation and Assessment    Patient: Diana Duncan. MRN: [id number]  SSN: [id number]    YOB: 1950  Age: 78 y.o. Sex: male       Cardiovascular Function/Vital Signs  Visit Vitals    /74    Pulse 60    Temp 36.3 °C (97.4 °F)    Resp 16    Ht 5' 5\" (1.651 m)    Wt 81.2 kg (179 lb)    SpO2 99%    BMI 29.79 kg/m2       Patient is status post MAC anesthesia for Procedure(s):  COLONOSCOPY / polypectomy. Nausea/Vomiting: None    Postoperative hydration reviewed and adequate. Pain:0  Pain Scale 1: Numeric (0 - 10) (07/19/17 1330)  Pain Intensity 1: 0 (07/19/17 1330)   Managed    Neurological Status: At baseline    Mental Status and Level of Consciousness: Arousable    Pulmonary Status:   O2 Device: Room air (07/19/17 1330)   Adequate oxygenation and airway patent    Complications related to anesthesia: None    Post-anesthesia assessment completed.  No concerns    Signed By: Lavonne Lynch MD     July 19, 2017

## 2017-07-19 NOTE — IP AVS SNAPSHOT
Gibran [place] 
 
 
 [address] [address] 
[phone] Patient: Benjamin Guerra. MRN: [id number] :1950 You are allergic to the following No active allergies Recent Documentation Height Weight BMI Smoking Status 1.651 m 81.2 kg 29.79 kg/m2 Never Smoker Emergency Contacts Name Discharge Info Relation Home Work Mobile Adriana Jo [5] [phone] About your hospitalization You were admitted on:  2017 You last received care in the:  [facility] You were discharged on:  2017 Unit phone number:  [phone] Why you were hospitalized Your primary diagnosis was:  Not on File Providers Seen During Your Hospitalizations Provider Role Specialty Primary office phone Jayne Simmonds, MD Attending Provider Colon and Rectal Surgery [phone] Your Primary Care Physician (PCP) Primary Care Physician Office Phone Office Fax Aida Lopez [phone] Follow-up Information Follow up With Details Comments Contact Info Dori Acevedo MD   [facility] [address] [place] [address] Primary [facility] [address] [facility] [address] 
[phone] Jayne Simmonds, MD  No aspirin or ibuprofen (e.g. Aleve, Motrin, Advil) for 5 days. Repeat colonoscopy in 5 years. [place] [address] [address] [facility] [address] [facility] [address] 
[phone] Current Discharge Medication List  
  
CONTINUE these medications which have NOT CHANGED Dose & Instructions Dispensing Information Comments Morning Noon Evening Bedtime  
 amLODIPine 10 mg tablet Commonly known as:  Job Dub Your last dose was: Your next dose is:    
   
   
 TK 1 T PO D FOR BP Refills:  2  
     
   
   
   
  
 atenolol 50 mg tablet Commonly known as:  TENORMIN  
   
 Your last dose was: Your next dose is:    
   
   
 Dose:  50 mg Take 1 Tab by mouth daily. Quantity:  90 Tab Refills:  2  
     
   
   
   
  
 atorvastatin 40 mg tablet Commonly known as:  LIPITOR Your last dose was: Your next dose is: TAKE 1 TABLET BY MOUTH DAILY Quantity:  90 Tab Refills:  2  
 **Patient requests 90 days supply**  
    
   
   
   
  
 clotrimazole-betamethasone topical cream  
Commonly known as:  Amanda Loffler Your last dose was: Your next dose is:    
   
   
 Apply  to affected area two (2) times a day. Refills:  0  
     
   
   
   
  
 codeine-butalbital-acetaminophen-caffeine -34-30 mg per capsule Commonly known as:  FIORICET WITH CODEINE Your last dose was: Your next dose is:    
   
   
 Dose:  1 Cap Take 1 Cap by mouth every six (6) hours as needed for Headache or Migraine. Max Daily Amount: 4 Caps. Quantity:  30 Cap Refills:  0  
     
   
   
   
  
 * hydroCHLOROthiazide 25 mg tablet Commonly known as:  HYDRODIURIL Your last dose was: Your next dose is: TAKE 1 TABLET BY MOUTH DAILY Quantity:  90 Tab Refills:  1 **Patient requests 90 days supply**  
    
   
   
   
  
 * hydroCHLOROthiazide 25 mg tablet Commonly known as:  HYDRODIURIL Your last dose was: Your next dose is: TAKE 1 TABLET BY MOUTH DAILY Quantity:  60 Tab Refills:  0  
     
   
   
   
  
 ketoconazole 2 % topical cream  
Commonly known as:  NIZORAL Your last dose was: Your next dose is:    
   
   
 Apply  to affected area daily. Quantity:  60 g Refills:  0  
     
   
   
   
  
 losartan 100 mg tablet Commonly known as:  COZAAR Your last dose was: Your next dose is:    
   
   
 Dose:  100 mg Take 1 Tab by mouth daily. Indications: Hypertension Quantity:  90 Tab Refills:  1 sertraline 50 mg tablet Commonly known as:  ZOLOFT Your last dose was: Your next dose is:    
   
   
 Dose:  100 mg Take 2 Tabs by mouth daily. Quantity:  180 Tab Refills:  3 SUMAtriptan 25 mg tablet Commonly known as:  IMITREX Your last dose was: Your next dose is:    
   
   
 Dose:  25 mg Take 25 mg by mouth once as needed for Migraine. Refills:  0  
     
   
   
   
  
 * Notice: This list has 2 medication(s) that are the same as other medications prescribed for you. Read the directions carefully, and ask your doctor or other care provider to review them with you. Discharge Instructions Colonoscopy: What to Expect at [facility] Your Recovery After you have a colonoscopy, you will stay at the clinic for 1 to 2 hours until the medicines wear off. Then you can go home. But you will need to arrange for a ride. Your doctor will tell you when you can eat and do your other usual activities. Your doctor will talk to you about when you will need your next colonoscopy. Your doctor can help you decide how often you need to be checked. This will depend on the results of your test and your risk for colorectal cancer. After the test, you may be bloated or have gas pains. You may need to pass gas. If a biopsy was done or a polyp was removed, you may have streaks of blood in your stool (feces) for a few days. This care sheet gives you a general idea about how long it will take for you to recover. But each person recovers at a different pace. Follow the steps below to get better as quickly as possible. How can you care for yourself at home? Activity · Rest when you feel tired. · You can do your normal activities when it feels okay to do so. Diet · Follow your doctor's directions for eating. · Unless your doctor has told you not to, drink plenty of fluids. This helps to replace the fluids that were lost during the colon prep. · Do not drink alcohol. Medicines · If polyps were removed or a biopsy was done during the test, your doctor may tell you not to take aspirin or other anti-inflammatory medicines for a few days. These include ibuprofen (Advil, Motrin) and naproxen (Aleve). Other instructions · For your safety, do not drive or operate machinery until the medicine wears off and you can think clearly. Your doctor may tell you not to drive or operate machinery until the day after your test. 
· Do not sign legal documents or make major decisions until the medicine wears off and you can think clearly. The anesthesia can make it hard for you to fully understand what you are agreeing to. Follow-up care is a key part of your treatment and safety. Be sure to make and go to all appointments, and call your doctor if you are having problems. It's also a good idea to know your test results and keep a list of the medicines you take. When should you call for help? Call 911 anytime you think you may need emergency care. For example, call if: 
· You passed out (lost consciousness). · You pass maroon or bloody stools. · You have severe belly pain. Call your doctor now or seek immediate medical care if: 
· Your stools are black and tarlike. · Your stools have streaks of blood, but you did not have a biopsy or any polyps removed. · You have belly pain, or your belly is swollen and firm. · You vomit. · You have a fever. · You are very dizzy. Watch closely for changes in your health, and be sure to contact your doctor if you have any problems. Where can you learn more? Go to Newforma.be Enter E264 in the search box to learn more about \"Colonoscopy: What to Expect at Home. \"  
© 1178-5219 Healthwise, Incorporated. Care instructions adapted under license by Maye Haddad (which disclaims liability or warranty for this information).  This care instruction is for use with your licensed healthcare professional. If you have questions about a medical condition or this instruction, always ask your healthcare professional. [address] any warranty or liability for your use of this information. Content Version: 94.5.864282; Current as of: November 14, 2014 Colon Polyps: Care Instructions Your Care Instructions Colon polyps are growths in the colon or the rectum. The cause of most colon polyps is not known, and most people who get them do not have any problems. But a certain kind can turn into cancer. For this reason, regular testing for colon polyps is important for people age 48 and older and anyone who has an increased risk for colon cancer. Polyps are usually found through routine colon cancer screening tests. Although most colon polyps are not cancerous, they are usually removed and then tested for cancer. Screening for colon cancer saves lives because the cancer can usually be cured if it is caught early. If you have a polyp that is the type that can turn into cancer, you may need more tests to examine your entire colon. The doctor will remove any other polyps that he or she finds, and you will be tested more often. Follow-up care is a key part of your treatment and safety. Be sure to make and go to all appointments, and call your doctor if you are having problems. It's also a good idea to know your test results and keep a list of the medicines you take. How can you care for yourself at home? Regular exams to look for colon polyps are the best way to prevent polyps from turning into colon cancer. These can include stool tests, sigmoidoscopy, colonoscopy, and CT colonography. Talk with your doctor about a testing schedule that is right for you. To prevent polyps There is no home treatment that can prevent colon polyps. But these steps may help lower your risk for cancer. · Stay active.  Being active can help you get to and stay at a healthy weight. Try to exercise on most days of the week. Walking is a good choice. · Eat well. Choose a variety of vegetables, fruits, legumes (such as peas and beans), fish, poultry, and whole grains. · Do not smoke. If you need help quitting, talk to your doctor about stop-smoking programs and medicines. These can increase your chances of quitting for good. · If you drink alcohol, limit how much you drink. Limit alcohol to 2 drinks a day for men and 1 drink a day for women. When should you call for help? Call your doctor now or seek immediate medical care if: 
· You have severe belly pain. · Your stools are maroon or very bloody. Watch closely for changes in your health, and be sure to contact your doctor if: 
· You have a fever. · You have nausea or vomiting. · You have a change in bowel habits (new constipation or diarrhea). · Your symptoms get worse or are not improving as expected. Where can you learn more? Go to http://lavonne-frances.info/. Enter 95 [id number] in the search box to learn more about \"Colon Polyps: Care Instructions. \" Current as of: May 5, 2017 Content Version: 11.3 © 5019-4438 [employer]. Care instructions adapted under license by [employer] (which disclaims liability or warranty for this information). If you have questions about a medical condition or this instruction, always ask your healthcare professional. [place] [address] any warranty or liability for your use of this information. DISCHARGE SUMMARY from Nurse POST-PROCEDURE INSTRUCTIONS: 
 
Call your Physician if you: 
? Observe any excess bleeding. ? Develop a temperature over 100.5o F. 
? Experience abdominal, shoulder or chest pain. ? Notice any signs of decreased circulation or nerve impairment to an extremity such as a change in color, persistent numbness, tingling, coldness or increase in pain. ? Vomit blood or you have nausea and vomiting lasting longer than 4 hours. ? Are unable to take medications. ? Are unable to urinate within 8 hours after discharge following general anesthesia or intravenous sedation. For the next 24 hours after receiving general anesthesia or intravenous sedation, or while taking prescription Narcotics, limit your activities: 
? Do NOT drive a motor vehicle, operate hazard machinery or power tools, or perform tasks that require coordination. The medication you received during your procedure may have some effect on your mental awareness. ? Do NOT make important personal or business decisions. The medication you received during your procedure may have some effect on your mental awareness. ? Do NOT drink alcoholic beverages. These drinks do not mix well with the medications that have been given to you. ? Upon discharge from the hospital, you must be accompanied by a responsible adult. ? Resume your diet as directed by your physician. ? Resume medications as your physician has prescribed. ? Please give a list of your current medications to your Primary Care Provider. ? Please update this list whenever your medications are discontinued, doses are changed, or new medications (including over-the-counter products) are added. ? Please carry medication information at all times in case of emergency situations. These are general instructions for a healthy lifestyle: No smoking/ No tobacco products/ Avoid exposure to second hand smoke. ? Surgeon General's Warning:  Quitting smoking now greatly reduces serious risk to your health. Obesity, smoking, and a sedentary lifestyle greatly increase your risk for illness. ? A healthy diet, regular physical exercise & weight monitoring are important for maintaining a healthy lifestyle ?  You may be retaining fluid if you have a history of heart failure or if you experience any of the following symptoms:  Weight gain of 3 pounds or more overnight or 5 pounds in a week, increased swelling in our hands or feet or shortness of breath while lying flat in bed. Please call your doctor as soon as you notice any of these symptoms; do not wait until your next office visit. Recognize signs and symptoms of STROKE: 
F  -  Face looks uneven A  -  Arms unable to move or move unevenly S  -  Speech slurred or non-existent T  -  Time to call 911 - as soon as signs and symptoms begin - DO NOT go back to bed or wait to see If you get better - TIME IS BRAIN. Colorectal Screening ? Colorectal cancer almost always develops from precancerous polyps (abnormal growths) in the colon or rectum. Screening tests can find precancerous polyps, so that they can be removed before they turn into cancer. Screening tests can also find colorectal cancer early, when treatment works best. 
? Speak with your physician about when you should begin screening and how often you should be tested. Additional Information If you have questions, please call [phone]. Remember, Simpleshow is NOT to be used for urgent needs. For medical emergencies, dial 911. Educational references and/or instructions provided during this visit included: 
 
Colon Polyps APPOINTMENTS: 
 
Please make a follow-up appointment with your physician. Discharge information has been reviewed with the patient. The patient verbalized understanding. Discharge Orders None Introducing [address] Faraz Ontiveros introduces Simpleshow patient portal. Now you can access parts of your medical record, email your doctor's office, and request medication refills online. 1. In your internet browser, go to [email or web address] 2. Click on the First Time User? Click Here link in the Sign In box. You will see the New Member Sign Up page. 3. Enter your LookAcross Access Code exactly as it appears below. You will not need to use this code after youve completed the sign-up process. If you do not sign up before the expiration date, you must request a new code. · LookAcross Access Code: [facility] Expires: 9/7/2017  4:54 PM 
 
4. Enter the last four digits of your Social Security Number (xxxx) and Date of Birth (mm/dd/yyyy) as indicated and click Submit. You will be taken to the next sign-up page. 5. Create a LookAcross ID. This will be your LookAcross login ID and cannot be changed, so think of one that is secure and easy to remember. 6. Create a LookAcross password. You can change your password at any time. 7. Enter your Password Reset Question and Answer. This can be used at a later time if you forget your password. 8. Enter your e-mail address. You will receive e-mail notification when new information is available in [address] 9. Click Sign Up. You can now view and download portions of your medical record. 10. Click the Download Summary menu link to download a portable copy of your medical information. If you have questions, please visit the Frequently Asked Questions section of the LookAcross website. Remember, LookAcross is NOT to be used for urgent needs. For medical emergencies, dial 911. Now available from your iPhone and Android! General Information Please provide this summary of care documentation to your next provider. Patient Signature:  ____________________________________________________________ Date:  ____________________________________________________________  
  
Wilson Livings Provider Signature:  ____________________________________________________________ Date:  ____________________________________________________________

## 2017-07-19 NOTE — PROCEDURES
[facility]  [address] [facility], [address]  [phone]                    Colonoscopy Procedure Note      Zaida Rondon.  1950  [id number]                Date of Procedure: 7/19/2017    Preoperative diagnosis: Z12.11,  Colon cancer Screening    Postoperative diagnosis: Descending Colon Polyp    :  Michelle Marmolejo MD    Assistant(s): Endoscopy Technician-1: Shaila Castillo  Endoscopy Technician-2: Walter Tran  Endoscopy RN-1: Khushi Travis RN    Sedation: MAC    Procedure Details:  Prior to the procedure, a history and physical were performed. The patients medications, allergies and sensitivities were reviewed and all questions were answered. After informed consent was obtained for the procedure, with all risks and benefits of procedure explained. The patient was taken to the endoscopy suite and placed in the left lateral decubitus position. Patient identification and proposed procedure were verified prior to the procedure by the nurse and I. Following sequential administration of sedation as per Anesthesia, a digital rectal exam was performed and was normal.  The Olympus video colonoscope was introduced through the anus and advanced to cecum, which was identified by the ileocecal valve and appendiceal orifice. The quality of preparation was good. The colonoscope was slowly withdrawn and the mucosa examined for any abnormalities. Cecal withdrawl time was greater than six minutes. The patient tolerated the procedure well. Findings/Interventions:   Polyps - #1, 7 mm in size, located in the descending colon, removed by hot biopsy and sent for pathology    EBL: none    Recommendations: -Repeat colonoscopy in 5 years. NO aspirin for 5 days.      Discharge Disposition:  Aleksandra Eric MD  7/19/2017  1:02 PM

## 2017-08-10 ENCOUNTER — TELEPHONE (OUTPATIENT)
Dept: SURGERY | Age: 67
End: 2017-08-10

## 2017-08-10 NOTE — TELEPHONE ENCOUNTER
----- Message from Shannan Cabrera MD sent at 7/24/2017 10:03 AM EDT -----  Benign polyp(s). Repeat colonoscopy in 5 years as planned. Notified patient of pathology results. Monika in [place] for 5 years. Patient understands.

## 2017-08-30 DIAGNOSIS — I10 ESSENTIAL HYPERTENSION: ICD-10-CM

## 2017-08-30 RX ORDER — LOSARTAN POTASSIUM 100 MG/1
TABLET ORAL
Qty: 90 TAB | Refills: 0 | Status: SHIPPED | OUTPATIENT
Start: 2017-08-30 | End: 2017-12-26 | Stop reason: SDUPTHER

## 2017-09-18 DIAGNOSIS — I10 ESSENTIAL HYPERTENSION: ICD-10-CM

## 2017-09-18 RX ORDER — HYDROCHLOROTHIAZIDE 25 MG/1
TABLET ORAL
Qty: 60 TAB | Refills: 0 | Status: SHIPPED | OUTPATIENT
Start: 2017-09-18 | End: 2017-12-10 | Stop reason: SDUPTHER

## 2017-09-27 ENCOUNTER — OFFICE VISIT (OUTPATIENT)
Dept: FAMILY MEDICINE CLINIC | Age: 67
End: 2017-09-27

## 2017-09-27 VITALS
SYSTOLIC BLOOD PRESSURE: 137 MMHG | RESPIRATION RATE: 18 BRPM | OXYGEN SATURATION: 98 % | BODY MASS INDEX: 30.46 KG/M2 | WEIGHT: 182.8 LBS | HEIGHT: 65 IN | DIASTOLIC BLOOD PRESSURE: 82 MMHG | TEMPERATURE: 96.7 F | HEART RATE: 67 BPM

## 2017-09-27 DIAGNOSIS — R19.7 DIARRHEA, UNSPECIFIED TYPE: ICD-10-CM

## 2017-09-27 DIAGNOSIS — I10 ESSENTIAL HYPERTENSION: ICD-10-CM

## 2017-09-27 DIAGNOSIS — G89.29 CHRONIC PAIN OF RIGHT KNEE: ICD-10-CM

## 2017-09-27 DIAGNOSIS — M25.561 CHRONIC PAIN OF RIGHT KNEE: ICD-10-CM

## 2017-09-27 DIAGNOSIS — G89.29 CHRONIC PAIN OF RIGHT KNEE: Primary | ICD-10-CM

## 2017-09-27 DIAGNOSIS — M25.561 CHRONIC PAIN OF RIGHT KNEE: Primary | ICD-10-CM

## 2017-09-27 RX ORDER — METOPROLOL SUCCINATE 50 MG/1
TABLET, EXTENDED RELEASE ORAL
Qty: 90 TAB | Refills: 3 | Status: SHIPPED | OUTPATIENT
Start: 2017-09-27 | End: 2020-10-06 | Stop reason: SDUPTHER

## 2017-09-27 RX ORDER — DICLOFENAC SODIUM 50 MG/1
TABLET, DELAYED RELEASE ORAL
Qty: 180 TAB | Refills: 0 | Status: SHIPPED | OUTPATIENT
Start: 2017-09-27 | End: 2018-02-20

## 2017-09-27 RX ORDER — OMEPRAZOLE 20 MG/1
CAPSULE, DELAYED RELEASE ORAL
Qty: 90 CAP | Refills: 0 | Status: SHIPPED | OUTPATIENT
Start: 2017-09-27 | End: 2017-12-26 | Stop reason: SDUPTHER

## 2017-09-27 RX ORDER — METOPROLOL SUCCINATE 50 MG/1
50 TABLET, EXTENDED RELEASE ORAL DAILY
Qty: 30 TAB | Refills: 3 | Status: SHIPPED | OUTPATIENT
Start: 2017-09-27 | End: 2017-09-27 | Stop reason: SDUPTHER

## 2017-09-27 RX ORDER — PHENOL/SODIUM PHENOLATE
20 AEROSOL, SPRAY (ML) MUCOUS MEMBRANE DAILY
Qty: 30 TAB | Refills: 0 | Status: SHIPPED | OUTPATIENT
Start: 2017-09-27 | End: 2017-09-27 | Stop reason: SDUPTHER

## 2017-09-27 RX ORDER — DICLOFENAC SODIUM 50 MG/1
50 TABLET, DELAYED RELEASE ORAL
Qty: 60 TAB | Refills: 0 | Status: SHIPPED | OUTPATIENT
Start: 2017-09-27 | End: 2017-09-27 | Stop reason: SDUPTHER

## 2017-09-27 NOTE — PATIENT INSTRUCTIONS
Diarrhea: Care Instructions  Your Care Instructions    Diarrhea is loose, watery stools (bowel movements). The exact cause is often hard to find. Sometimes diarrhea is your body's way of getting rid of what caused an upset stomach. Viruses, food poisoning, and many medicines can cause diarrhea. Some people get diarrhea in response to emotional stress, anxiety, or certain foods. Almost everyone has diarrhea now and then. It usually isn't serious, and your stools will return to normal soon. The important thing to do is replace the fluids you have lost, so you can prevent dehydration. The doctor has checked you carefully, but problems can develop later. If you notice any problems or new symptoms, get medical treatment right away. Follow-up care is a key part of your treatment and safety. Be sure to make and go to all appointments, and call your doctor if you are having problems. It's also a good idea to know your test results and keep a list of the medicines you take. How can you care for yourself at home? · Watch for signs of dehydration, which means your body has lost too much water. Dehydration is a serious condition and should be treated right away. Signs of dehydration are:  ¨ Increasing thirst and dry eyes and mouth. ¨ Feeling faint or lightheaded. ¨ Darker urine, and a smaller amount of urine than normal.  · To prevent dehydration, drink plenty of fluids, enough so that your urine is light yellow or clear like water. Choose water and other caffeine-free clear liquids until you feel better. If you have kidney, heart, or liver disease and have to limit fluids, talk with your doctor before you increase the amount of fluids you drink. · Begin eating small amounts of mild foods the next day, if you feel like it. ¨ Try yogurt that has live cultures of Lactobacillus. (Check the label.)  ¨ Avoid spicy foods, fruits, alcohol, and caffeine until 48 hours after all symptoms are gone.   ¨ Avoid chewing gum that contains sorbitol. ¨ Avoid dairy products (except for yogurt with Lactobacillus) while you have diarrhea and for 3 days after symptoms are gone. · The doctor may recommend that you take over-the-counter medicine, such as loperamide (Imodium), if you still have diarrhea after 6 hours. Read and follow all instructions on the label. Do not use this medicine if you have bloody diarrhea, a high fever, or other signs of serious illness. Call your doctor if you think you are having a problem with your medicine. When should you call for help? Call 911 anytime you think you may need emergency care. For example, call if:  · You passed out (lost consciousness). · Your stools are maroon or very bloody. Call your doctor now or seek immediate medical care if:  · You are dizzy or lightheaded, or you feel like you may faint. · Your stools are black and look like tar, or they have streaks of blood. · You have new or worse belly pain. · You have symptoms of dehydration, such as:  ¨ Dry eyes and a dry mouth. ¨ Passing only a little dark urine. ¨ Feeling thirstier than usual.  · You have a new or higher fever. Watch closely for changes in your health, and be sure to contact your doctor if:  · Your diarrhea is getting worse. · You see pus in the diarrhea. · You are not getting better after 2 days (48 hours). Where can you learn more? Go to http://lavonne-frances.info/. Enter A169 in the search box to learn more about \"Diarrhea: Care Instructions. \"  Current as of: March 20, 2017  Content Version: 11.3  © 0727-4140 [employer]. Care instructions adapted under license by [employer] (which disclaims liability or warranty for this information). If you have questions about a medical condition or this instruction, always ask your healthcare professional. [address] any warranty or liability for your use of this information.        Knee Pain or Injury: Care Instructions  Your Care Instructions    Injuries are a common cause of knee problems. Sudden (acute) injuries may be caused by a direct blow to the knee. They can also be caused by abnormal twisting, bending, or falling on the knee. Pain, bruising, or swelling may be severe, and may start within minutes of the injury. Overuse is another cause of knee pain. Other causes are climbing stairs, kneeling, and other activities that use the knee. Everyday wear and tear, especially as you get older, also can cause knee pain. Rest, along with home treatment, often relieves pain and allows your knee to heal. If you have a serious knee injury, you may need tests and treatment. Follow-up care is a key part of your treatment and safety. Be sure to make and go to all appointments, and call your doctor if you are having problems. It's also a good idea to know your test results and keep a list of the medicines you take. How can you care for yourself at home? · Be safe with medicines. Read and follow all instructions on the label. ¨ If the doctor gave you a prescription medicine for pain, take it as prescribed. ¨ If you are not taking a prescription pain medicine, ask your doctor if you can take an over-the-counter medicine. · Rest and protect your knee. Take a break from any activity that may cause pain. · Put ice or a cold pack on your knee for 10 to 20 minutes at a time. Put a thin cloth between the ice and your skin. · Prop up a sore knee on a pillow when you ice it or anytime you sit or lie down for the next 3 days. Try to keep it above the level of your heart. This will help reduce swelling. · If your knee is not swollen, you can put moist heat, a heating pad, or a warm cloth on your knee. · If your doctor recommends an elastic bandage, sleeve, or other type of support for your knee, wear it as directed. · Follow your doctor's instructions about how much weight you can put on your leg.  Use a cane, crutches, or a walker as instructed. · Follow your doctor's instructions about activity during your healing process. If you can do mild exercise, slowly increase your activity. · Reach and stay at a healthy weight. Extra weight can strain the joints, especially the knees and hips, and make the pain worse. Losing even a few pounds may help. When should you call for help? Call 911 anytime you think you may need emergency care. For example, call if:  · You have symptoms of a blood clot in your lung (called a pulmonary embolism). These may include:  ¨ Sudden chest pain. ¨ Trouble breathing. ¨ Coughing up blood. Call your doctor now or seek immediate medical care if:  · You have severe or increasing pain. · Your leg or foot turns cold or changes color. · You cannot stand or put weight on your knee. · Your knee looks twisted or bent out of shape. · You cannot move your knee. · You have signs of infection, such as:  ¨ Increased pain, swelling, warmth, or redness. ¨ Red streaks leading from the knee. ¨ Pus draining from a place on your knee. ¨ A fever. · You have signs of a blood clot in your leg (called a deep vein thrombosis), such as:  ¨ Pain in your calf, back of the knee, thigh, or groin. ¨ Redness and swelling in your leg or groin. Watch closely for changes in your health, and be sure to contact your doctor if:  · You have tingling, weakness, or numbness in your knee. · You have any new symptoms, such as swelling. · You have bruises from a knee injury that last longer than 2 weeks. · You do not get better as expected. Where can you learn more? Go to http://lavonne-frances.info/. Enter K195 in the search box to learn more about \"Knee Pain or Injury: Care Instructions. \"  Current as of: March 20, 2017  Content Version: 11.3  © 0060-3759 [employer]. Care instructions adapted under license by [employer] (which disclaims liability or warranty for this information).  If you have questions about a medical condition or this instruction, always ask your healthcare professional. [place] [address] any warranty or liability for your use of this information.

## 2017-09-27 NOTE — PROGRESS NOTES
Chief Complaint   Patient presents with    Knee Pain     Fall on Monday     Diarrhea     x 1 month      1. Have you been to the ER, urgent care clinic since your last visit? Hospitalized since your last visit? No    2. Have you seen or consulted any other health care providers outside of the [address] [place] [address] since your last visit? Include any pap smears or colon screening. No     HPI  [employer]. comes in for follow-up care. Knee pain: Patient has right knee pain. This has been chronic. Pain comes on walking and this has limited his activities of daily living. he did have an x-ray done that showed degenerative joint disease. Pain has become worse. At times when he is walking he does fall because of discomfort in the knee and the it at times locks up. He likely also has patellofemoral syndrome. I will have him take diclofenac for pain. I will refer him to the orthopedic clinic. He denies any swelling or erythema around the knee. Diarrhea: Patient has been having diarrhea for the past 1 month. Diarrhea is watery and bloody. It comes after meals. Not associated with any particular type of food. There is abdominal cramping. He has started using antidiarrheal medication over-the-counter but this has not helped much. He denies fever or chills. No nausea or vomiting. He denies abdominal distention or weight loss. I will do stool cultures. I will do his CBC and CMP. I will follow-up in the clinic. Recently did have a colonoscopy done that had a few polyps that were removed and were benign. Was advised to get this rechecked in 5 years. May consider referral to gastroenterologist if symptoms persist.  Hypertension: Patient's blood pressure is stable. He is on atenolol, HCTZ, losartan and amlodipine. He has been out of the atenolol and this is not available at the moment. I will substitute with metoprolol XL 50 mg daily. Headache: Patient has a history of migrainous headache. These have been stable. He does have Imitrex to take as needed. Will continue with the current treatment plan. Past Medical History  Past Medical History:   Diagnosis Date    BPH (benign prostatic hyperplasia)     Depression     Hypertension     Irritability and anger        Surgical History  Past Surgical History:   Procedure Laterality Date    COLONOSCOPY N/A 7/19/2017    COLONOSCOPY / polypectomy performed by Rocío Ibarra MD at [facility] OTHER SURGICAL  1993    plate placed in head due to injury    MO COLSC FLX W/REMOVAL LESION BY HOT BX FORCEPS N/A 07/19/2017    Dr. Mitzi Severs        Medications  Current Outpatient Prescriptions   Medication Sig Dispense Refill    metoprolol succinate (TOPROL-XL) 50 mg XL tablet Take 1 Tab by mouth daily. 30 Tab 3    diclofenac EC (VOLTAREN) 50 mg EC tablet Take 1 Tab by mouth two (2) times daily as needed. 60 Tab 0    Omeprazole delayed release (PRILOSEC D/R) 20 mg tablet Take 1 Tab by mouth daily. 30 Tab 0    hydroCHLOROthiazide (HYDRODIURIL) 25 mg tablet TAKE 1 TABLET BY MOUTH DAILY 60 Tab 0    losartan (COZAAR) 100 mg tablet TAKE 1 TABLET BY MOUTH DAILY FOR HIGH BLOOD PRESSURE 90 Tab 0    SUMAtriptan (IMITREX) 25 mg tablet Take 25 mg by mouth once as needed for Migraine.  codeine-butalbital-acetaminophen-caffeine (FIORICET WITH CODEINE) -63-30 mg per capsule Take 1 Cap by mouth every six (6) hours as needed for Headache or Migraine. Max Daily Amount: 4 Caps. 30 Cap 0    sertraline (ZOLOFT) 50 mg tablet Take 2 Tabs by mouth daily.  180 Tab 3    atorvastatin (LIPITOR) 40 mg tablet TAKE 1 TABLET BY MOUTH DAILY 90 Tab 2    amLODIPine (NORVASC) 10 mg tablet TK 1 T PO D FOR BP  2       Allergies  No Known Allergies    Family History  Family History   Problem Relation Age of Onset    Diabetes Mother     Heart Disease Father     Diabetes Father        Social History  Social History     Social History    Marital status: SINGLE Spouse name: N/A    Number of children: N/A    Years of education: N/A     Occupational History    Not on file.      Social History Main Topics    Smoking status: Never Smoker    Smokeless tobacco: Never Used    Alcohol use No    Drug use: No    Sexual activity: Yes     Partners: Female     Other Topics Concern    Not on file     Social History Narrative       Review of Systems  Review of Systems - History obtained from spouse, chart review and the patient  General ROS: positive for  - fatigue and malaise  negative for - chills, fever or weight loss  Psychological ROS: positive for - behavioral disorder and mood swings  Ophthalmic ROS: positive for - decreased vision  Allergy and Immunology ROS: negative  Hematological and Lymphatic ROS: negative  Respiratory ROS: no cough, shortness of breath, or wheezing  Cardiovascular ROS: no chest pain or dyspnea on exertion  Gastrointestinal ROS: positive for - abdominal pain and diarrhea  negative for - appetite loss, blood in stools, heartburn, hematemesis or nausea/vomiting  Genito-Urinary ROS: negative  Musculoskeletal ROS: positive for - joint pain and pain in knee - right  Neurological ROS: negative    Vital Signs  Visit Vitals    /82 (BP 1 Location: Left arm, BP Patient Position: Sitting)    Pulse 67    Temp 96.7 °F (35.9 °C) (Oral)    Resp 18    Ht 5' 5\" (1.651 m)    Wt 182 lb 12.8 oz (82.9 kg)    SpO2 98%    BMI 30.42 kg/m2         Physical Exam  Physical Examination: General appearance - oriented to person, place, and time, acyanotic, in no respiratory distress and well hydrated  Mental status - alert, oriented to person, place, and time, affect appropriate to mood  Nose - normal and patent, no erythema, discharge or polyps  Neck - supple, no significant adenopathy  Lymphatics - no palpable lymphadenopathy  Chest - clear to auscultation, no wheezes, rales or rhonchi, symmetric air entry  Heart - normal rate and regular rhythm, S1 and S2 normal  Back exam - limited range of motion  Neurological - alert, oriented, normal speech, no focal findings or movement disorder noted  Musculoskeletal -right knee with discomfort to palpation around the patella margins more so medially. Limited range of motion especially flexion and extension due to discomfort. No swelling or erythema noted. Diagnostics  Orders Placed This Encounter    CULTURE, STOOL     Standing Status:   Future     Standing Expiration Date:   9/28/2018    CBC WITH AUTOMATED DIFF     Standing Status:   Future     Standing Expiration Date:   9/28/2018    LIPID PANEL     Standing Status:   Future     Standing Expiration Date:   9/51/2627    METABOLIC PANEL, COMPREHENSIVE     Standing Status:   Future     Standing Expiration Date:   9/28/2018    [address]     Referral Priority:   Routine     Referral Type:   Consultation     Referral Reason:   Specialty Services Required     Referred to Provider:   Moose Leon MD    metoprolol succinate (TOPROL-XL) 50 mg XL tablet     Sig: Take 1 Tab by mouth daily. Dispense:  30 Tab     Refill:  3    diclofenac EC (VOLTAREN) 50 mg EC tablet     Sig: Take 1 Tab by mouth two (2) times daily as needed. Dispense:  60 Tab     Refill:  0    Omeprazole delayed release (PRILOSEC D/R) 20 mg tablet     Sig: Take 1 Tab by mouth daily.      Dispense:  30 Tab     Refill:  0         Results  Results for orders placed or performed in visit on 02/20/17   AMB POC URINALYSIS DIP STICK AUTO W/O MICRO   Result Value Ref Range    Color (UA POC) Dark Yellow     Clarity (UA POC) Clear     Glucose (UA POC) Negative Negative    Bilirubin (UA POC) Negative Negative    Ketones (UA POC) Negative Negative    Specific gravity (UA POC) 1.020 1.001 - 1.035    Blood (UA POC) Negative Negative    pH (UA POC) 7.0 4.6 - 8.0    Protein (UA POC) Negative Negative mg/dL    Urobilinogen (UA POC) 1 mg/dL 0.2 - 1    Nitrites (UA POC) Negative Negative    Leukocyte esterase (UA POC) Negative Negative       ASSESSMENT and PLAN    ICD-10-CM ICD-9-CM    1. Chronic pain of right knee M25.561 719.46 REFERRAL TO ORTHOPEDICS    G89.29 338.29 DISCONTINUED: metoprolol succinate (TOPROL-XL) 50 mg XL tablet      DISCONTINUED: diclofenac EC (VOLTAREN) 50 mg EC tablet   2. Essential hypertension I10 401.9 CBC WITH AUTOMATED DIFF      LIPID PANEL      METABOLIC PANEL, COMPREHENSIVE   3. Diarrhea, unspecified type R19.7 787.91 CBC WITH AUTOMATED DIFF      CULTURE, STOOL      NC COLLECTION VENOUS BLOOD,VENIPUNCTURE     lab results and schedule of future lab studies reviewed with patient  reviewed diet, exercise and weight control  cardiovascular risk and specific lipid/LDL goals reviewed  reviewed medications and side effects in detail    I have discussed the diagnosis with the patient and the intended plan of care as seen in the above orders. The patient has received an after-visit summary and questions were answered concerning future plans. I have discussed medication, side effects, and warnings with the patient in detail. The patient verbalized understanding and is in agreement with the plan of care. The patient will contact the office with any additional concerns.     Sasha Jason MD

## 2017-09-27 NOTE — MR AVS SNAPSHOT
Visit Information Date & Time Provider Department Dept. Phone Encounter #  
 9/27/2017 11:30 AM MD Radha Mclaughlin [phone] [id number] Follow-up Instructions Return in about 3 weeks (around 10/18/2017), or if symptoms worsen or fail to improve, for knee pain, diarrhea. Your Appointments 10/6/2017  3:45 PM  
ROUTINE CARE with MD Radha Mclaughlin ([facility]) Appt Note: Return in about 3 months (around 10/7/2017), or if symptoms worsen or fail to improve, for HTN, Knee pain. [address]  
  
   
 [address] Upcoming Health Maintenance Date Due FOBT Q 1 YEAR AGE 50-75 12/12/1999 ZOSTER VACCINE AGE 60> 10/12/2009 GLAUCOMA SCREENING Q2Y 12/12/2014 INFLUENZA AGE 9 TO ADULT 8/1/2017 Pneumococcal 65+ Low/Medium Risk (2 of 2 - PPSV23) 1/19/2018 MEDICARE YEARLY EXAM 2/21/2018 DTaP/Tdap/Td series (2 - Td) 7/12/2027 Allergies as of 9/27/2017  Review Complete On: 9/27/2017 By: Ajit Harrell MD  
 No Known Allergies Current Immunizations  Reviewed on 7/12/2017 Name Date Tdap 7/12/2017 Not reviewed this visit You Were Diagnosed With   
  
 Codes Comments Chronic pain of right knee    -  Primary ICD-10-CM: M25.561, V34.59 ICD-9-CM: 719.46, 338.29 Essential hypertension     ICD-10-CM: I10 
ICD-9-CM: 401.9 Diarrhea, unspecified type     ICD-10-CM: R19.7 ICD-9-CM: 787.91 Vitals BP Pulse Temp Resp Height(growth percentile) Weight(growth percentile) 137/82 (BP 1 Location: Left arm, BP Patient Position: Sitting) 67 96.7 °F (35.9 °C) (Oral) 18 5' 5\" (1.651 m) 182 lb 12.8 oz (82.9 kg) SpO2 BMI Smoking Status 98% 30.42 kg/m2 Never Smoker BMI and BSA Data  Body Mass Index Body Surface Area  
 30.42 kg/m 2 1.95 m 2  
  
 Preferred Pharmacy Pharmacy Name Phone [address] [facility], [address] [facility] [phone] Your Updated Medication List  
  
   
This list is accurate as of: 9/27/17 12:11 PM.  Always use your most recent med list. amLODIPine 10 mg tablet Commonly known as:  Nickolas Rings TK 1 T PO D FOR BP  
  
 atorvastatin 40 mg tablet Commonly known as:  LIPITOR  
TAKE 1 TABLET BY MOUTH DAILY  
  
 codeine-butalbital-acetaminophen-caffeine -31-30 mg per capsule Commonly known as:  FIORICET WITH CODEINE Take 1 Cap by mouth every six (6) hours as needed for Headache or Migraine. Max Daily Amount: 4 Caps. diclofenac EC 50 mg EC tablet Commonly known as:  VOLTAREN Take 1 Tab by mouth two (2) times daily as needed. hydroCHLOROthiazide 25 mg tablet Commonly known as:  HYDRODIURIL  
TAKE 1 TABLET BY MOUTH DAILY losartan 100 mg tablet Commonly known as:  COZAAR  
TAKE 1 TABLET BY MOUTH DAILY FOR HIGH BLOOD PRESSURE  
  
 metoprolol succinate 50 mg XL tablet Commonly known as:  TOPROL-XL Take 1 Tab by mouth daily. Omeprazole delayed release 20 mg tablet Commonly known as:  PRILOSEC D/R Take 1 Tab by mouth daily. sertraline 50 mg tablet Commonly known as:  ZOLOFT Take 2 Tabs by mouth daily. SUMAtriptan 25 mg tablet Commonly known as:  IMITREX Take 25 mg by mouth once as needed for Migraine. Prescriptions Sent to Pharmacy Refills  
 metoprolol succinate (TOPROL-XL) 50 mg XL tablet 3 Sig: Take 1 Tab by mouth daily. Class: Normal  
 Pharmacy: [facility] [facility] Ph #: [phone] Route: Oral  
 diclofenac EC (VOLTAREN) 50 mg EC tablet 0 Sig: Take 1 Tab by mouth two (2) times daily as needed.   
 Class: Normal  
 Pharmacy: [address], [place]  [facility] #: [phone] Route: Oral  
 Omeprazole delayed release (PRILOSEC D/R) 20 mg tablet 0 Sig: Take 1 Tab by mouth daily. Class: Normal  
 Pharmacy: [address] [facility] #: [phone] Route: Oral  
  
We Performed the Following REFERRAL TO ORTHOPEDICS [LJX737 Custom] Comments:  
 Please evaluate for chronic right knee pain. Follow-up Instructions Return in about 3 weeks (around 10/18/2017), or if symptoms worsen or fail to improve, for knee pain, diarrhea. To-Do List   
 09/27/2017 Lab:  CBC WITH AUTOMATED DIFF   
  
 09/27/2017 Microbiology:  CULTURE, STOOL   
  
 09/27/2017 Lab:  LIPID PANEL   
  
 09/27/2017 Lab:  METABOLIC PANEL, COMPREHENSIVE Referral Information Referral ID Referred By Referred To  
  
 [id number] [facility] Bety Larson MD   
   [address] [facility] [address] [place], [address] Phone: [phone] Fax: [phone] Visits Status Start Date End Date 1 New Request 9/27/17 9/27/18 If your referral has a status of pending review or denied, additional information will be sent to support the outcome of this decision. Patient Instructions Diarrhea: Care Instructions Your Care Instructions Diarrhea is loose, watery stools (bowel movements). The exact cause is often hard to find. Sometimes diarrhea is your body's way of getting rid of what caused an upset stomach. Viruses, food poisoning, and many medicines can cause diarrhea. Some people get diarrhea in response to emotional stress, anxiety, or certain foods. Almost everyone has diarrhea now and then. It usually isn't serious, and your stools will return to normal soon. The important thing to do is replace the fluids you have lost, so you can prevent dehydration. The doctor has checked you carefully, but problems can develop later. If you notice any problems or new symptoms, get medical treatment right away. Follow-up care is a key part of your treatment and safety. Be sure to make and go to all appointments, and call your doctor if you are having problems. It's also a good idea to know your test results and keep a list of the medicines you take. How can you care for yourself at home? · Watch for signs of dehydration, which means your body has lost too much water. Dehydration is a serious condition and should be treated right away. Signs of dehydration are: 
¨ Increasing thirst and dry eyes and mouth. ¨ Feeling faint or lightheaded. ¨ Darker urine, and a smaller amount of urine than normal. 
· To prevent dehydration, drink plenty of fluids, enough so that your urine is light yellow or clear like water. Choose water and other caffeine-free clear liquids until you feel better. If you have kidney, heart, or liver disease and have to limit fluids, talk with your doctor before you increase the amount of fluids you drink. · Begin eating small amounts of mild foods the next day, if you feel like it. ¨ Try yogurt that has live cultures of Lactobacillus. (Check the label.) ¨ Avoid spicy foods, fruits, alcohol, and caffeine until 48 hours after all symptoms are gone. ¨ Avoid chewing gum that contains sorbitol. ¨ Avoid dairy products (except for yogurt with Lactobacillus) while you have diarrhea and for 3 days after symptoms are gone. · The doctor may recommend that you take over-the-counter medicine, such as loperamide (Imodium), if you still have diarrhea after 6 hours. Read and follow all instructions on the label. Do not use this medicine if you have bloody diarrhea, a high fever, or other signs of serious illness. Call your doctor if you think you are having a problem with your medicine. When should you call for help? Call 911 anytime you think you may need emergency care. For example, call if: 
· You passed out (lost consciousness). · Your stools are maroon or very bloody. Call your doctor now or seek immediate medical care if: 
· You are dizzy or lightheaded, or you feel like you may faint. · Your stools are black and look like tar, or they have streaks of blood. · You have new or worse belly pain. · You have symptoms of dehydration, such as: ¨ Dry eyes and a dry mouth. ¨ Passing only a little dark urine. ¨ Feeling thirstier than usual. 
· You have a new or higher fever. Watch closely for changes in your health, and be sure to contact your doctor if: 
· Your diarrhea is getting worse. · You see pus in the diarrhea. · You are not getting better after 2 days (48 hours). Where can you learn more? Go to http://lavonne-frances.info/. Enter Q771 in the search box to learn more about \"Diarrhea: Care Instructions. \" Current as of: March 20, 2017 Content Version: 11.3 © 2634-3880 nubelo. Care instructions adapted under license by [employer] (which disclaims liability or warranty for this information). If you have questions about a medical condition or this instruction, always ask your healthcare professional. [place] [address] any warranty or liability for your use of this information. Knee Pain or Injury: Care Instructions Your Care Instructions Injuries are a common cause of knee problems. Sudden (acute) injuries may be caused by a direct blow to the knee. They can also be caused by abnormal twisting, bending, or falling on the knee. Pain, bruising, or swelling may be severe, and may start within minutes of the injury. Overuse is another cause of knee pain. Other causes are climbing stairs, kneeling, and other activities that use the knee. Everyday wear and tear, especially as you get older, also can cause knee pain. Rest, along with home treatment, often relieves pain and allows your knee to heal. If you have a serious knee injury, you may need tests and treatment. Follow-up care is a key part of your treatment and safety. Be sure to make and go to all appointments, and call your doctor if you are having problems. It's also a good idea to know your test results and keep a list of the medicines you take. How can you care for yourself at home? · Be safe with medicines. Read and follow all instructions on the label. ¨ If the doctor gave you a prescription medicine for pain, take it as prescribed. ¨ If you are not taking a prescription pain medicine, ask your doctor if you can take an over-the-counter medicine. · Rest and protect your knee. Take a break from any activity that may cause pain. · Put ice or a cold pack on your knee for 10 to 20 minutes at a time. Put a thin cloth between the ice and your skin. · Prop up a sore knee on a pillow when you ice it or anytime you sit or lie down for the next 3 days. Try to keep it above the level of your heart. This will help reduce swelling. · If your knee is not swollen, you can put moist heat, a heating pad, or a warm cloth on your knee. · If your doctor recommends an elastic bandage, sleeve, or other type of support for your knee, wear it as directed. · Follow your doctor's instructions about how much weight you can put on your leg. Use a cane, crutches, or a walker as instructed. · Follow your doctor's instructions about activity during your healing process. If you can do mild exercise, slowly increase your activity. · Reach and stay at a healthy weight. Extra weight can strain the joints, especially the knees and hips, and make the pain worse. Losing even a few pounds may help. When should you call for help? Call 911 anytime you think you may need emergency care.  For example, call if: 
· You have symptoms of a blood clot in your lung (called a pulmonary embolism). These may include: 
¨ Sudden chest pain. ¨ Trouble breathing. ¨ Coughing up blood. Call your doctor now or seek immediate medical care if: 
· You have severe or increasing pain. · Your leg or foot turns cold or changes color. · You cannot stand or put weight on your knee. · Your knee looks twisted or bent out of shape. · You cannot move your knee. · You have signs of infection, such as: 
¨ Increased pain, swelling, warmth, or redness. ¨ Red streaks leading from the knee. ¨ Pus draining from a place on your knee. ¨ A fever. · You have signs of a blood clot in your leg (called a deep vein thrombosis), such as: 
¨ Pain in your calf, back of the knee, thigh, or groin. ¨ Redness and swelling in your leg or groin. Watch closely for changes in your health, and be sure to contact your doctor if: 
· You have tingling, weakness, or numbness in your knee. · You have any new symptoms, such as swelling. · You have bruises from a knee injury that last longer than 2 weeks. · You do not get better as expected. Where can you learn more? Go to http://lavonne-frances.info/. Enter K195 in the search box to learn more about \"Knee Pain or Injury: Care Instructions. \" Current as of: March 20, 2017 Content Version: 11.3 © 3870-3683 Novadiol. Care instructions adapted under license by [employer] (which disclaims liability or warranty for this information). If you have questions about a medical condition or this instruction, always ask your healthcare professional. [place] [address] any warranty or liability for your use of this information. Introducing [facility]! [facility] introduces [employer] patient portal. Now you can access parts of your medical record, email your doctor's office, and request medication refills online. 1. In your internet browser, go to [email or web address] 2. Click on the First Time User? Click Here link in the Sign In box. You will see the New Member Sign Up page. 3. Enter your [employer] Access Code exactly as it appears below. You will not need to use this code after youve completed the sign-up process. If you do not sign up before the expiration date, you must request a new code. · [employer] Access Code: [email or web address] Expires: 12/26/2017 12:11 PM 
 
4. Enter the last four digits of your Social Security Number (xxxx) and Date of Birth (mm/dd/yyyy) as indicated and click Submit. You will be taken to the next sign-up page. 5. Create a [employer] ID. This will be your [employer] login ID and cannot be changed, so think of one that is secure and easy to remember. 6. Create a [employer] password. You can change your password at any time. 7. Enter your Password Reset Question and Answer. This can be used at a later time if you forget your password. 8. Enter your e-mail address. You will receive e-mail notification when new information is available in [address] 9. Click Sign Up. You can now view and download portions of your medical record. 10. Click the Download Summary menu link to download a portable copy of your medical information. If you have questions, please visit the Frequently Asked Questions section of the [employer] website. Remember, [employer] is NOT to be used for urgent needs. For medical emergencies, dial 911. Now available from your iPhone and Android! Please provide this summary of care documentation to your next provider. Your primary care clinician is listed as Cami Mcgee. If you have any questions after today's visit, please call [phone].

## 2017-09-28 LAB
ALBUMIN SERPL-MCNC: 4.5 G/DL (ref 3.6–4.8)
ALBUMIN/GLOB SERPL: 1.4 {RATIO} (ref 1.2–2.2)
ALP SERPL-CCNC: 106 IU/L (ref 39–117)
ALT SERPL-CCNC: 23 IU/L (ref 0–44)
AST SERPL-CCNC: 26 IU/L (ref 0–40)
BASOPHILS # BLD AUTO: 0 X10E3/UL (ref 0–0.2)
BASOPHILS NFR BLD AUTO: 0 %
BILIRUB SERPL-MCNC: 0.4 MG/DL (ref 0–1.2)
BUN SERPL-MCNC: 10 MG/DL (ref 8–27)
BUN/CREAT SERPL: 11 (ref 10–24)
CALCIUM SERPL-MCNC: 9.4 MG/DL (ref 8.6–10.2)
CAMPYLOBACTER STL CULT: NORMAL
CHLORIDE SERPL-SCNC: 98 MMOL/L (ref 96–106)
CHOLEST SERPL-MCNC: 123 MG/DL (ref 100–199)
CO2 SERPL-SCNC: 25 MMOL/L (ref 18–29)
CREAT SERPL-MCNC: 0.91 MG/DL (ref 0.76–1.27)
E COLI SXT STL QL IA: NORMAL
EOSINOPHIL # BLD AUTO: 0.1 X10E3/UL (ref 0–0.4)
EOSINOPHIL NFR BLD AUTO: 5 %
ERYTHROCYTE [DISTWIDTH] IN BLOOD BY AUTOMATED COUNT: 14.8 % (ref 12.3–15.4)
GLOBULIN SER CALC-MCNC: 3.3 G/DL (ref 1.5–4.5)
GLUCOSE SERPL-MCNC: 95 MG/DL (ref 65–99)
HCT VFR BLD AUTO: 42.1 % (ref 37.5–51)
HDLC SERPL-MCNC: 42 MG/DL
HGB BLD-MCNC: 14 G/DL (ref 12.6–17.7)
IMM GRANULOCYTES # BLD: 0 X10E3/UL (ref 0–0.1)
IMM GRANULOCYTES NFR BLD: 0 %
INTERPRETATION, 910389: NORMAL
LDLC SERPL CALC-MCNC: 39 MG/DL (ref 0–99)
LYMPHOCYTES # BLD AUTO: 1.4 X10E3/UL (ref 0.7–3.1)
LYMPHOCYTES NFR BLD AUTO: 45 %
MCH RBC QN AUTO: 29.7 PG (ref 26.6–33)
MCHC RBC AUTO-ENTMCNC: 33.3 G/DL (ref 31.5–35.7)
MCV RBC AUTO: 89 FL (ref 79–97)
MONOCYTES # BLD AUTO: 0.3 X10E3/UL (ref 0.1–0.9)
MONOCYTES NFR BLD AUTO: 9 %
NEUTROPHILS # BLD AUTO: 1.3 X10E3/UL (ref 1.4–7)
NEUTROPHILS NFR BLD AUTO: 41 %
PLATELET # BLD AUTO: 203 X10E3/UL (ref 150–379)
PLEASE NOTE:, 188601: NORMAL
POTASSIUM SERPL-SCNC: 3.8 MMOL/L (ref 3.5–5.2)
PROT SERPL-MCNC: 7.8 G/DL (ref 6–8.5)
RBC # BLD AUTO: 4.71 X10E6/UL (ref 4.14–5.8)
SALM + SHIG STL CULT: NORMAL
SODIUM SERPL-SCNC: 142 MMOL/L (ref 134–144)
TRIGL SERPL-MCNC: 212 MG/DL (ref 0–149)
VLDLC SERPL CALC-MCNC: 42 MG/DL (ref 5–40)
WBC # BLD AUTO: 3.1 X10E3/UL (ref 3.4–10.8)

## 2017-10-03 DIAGNOSIS — K52.9 CHRONIC DIARRHEA: Primary | ICD-10-CM

## 2017-10-03 LAB
CAMPYLOBACTER STL CULT: NORMAL
E COLI SXT STL QL IA: NORMAL
PLEASE NOTE:, 188601: NORMAL
SALM + SHIG STL CULT: NORMAL

## 2017-10-03 NOTE — PROGRESS NOTES
Please let patient know stool culture was not able to be done. I will refer him to the gastroenterologist for evaluation of the chronic diarrhea.   Verona Escobar MD

## 2017-10-19 DIAGNOSIS — R19.7 DIARRHEA, UNSPECIFIED TYPE: ICD-10-CM

## 2017-10-19 DIAGNOSIS — I10 ESSENTIAL HYPERTENSION: ICD-10-CM

## 2017-10-20 ENCOUNTER — OFFICE VISIT (OUTPATIENT)
Dept: FAMILY MEDICINE CLINIC | Age: 67
End: 2017-10-20

## 2017-10-20 VITALS
DIASTOLIC BLOOD PRESSURE: 84 MMHG | SYSTOLIC BLOOD PRESSURE: 148 MMHG | HEIGHT: 65 IN | BODY MASS INDEX: 30.66 KG/M2 | OXYGEN SATURATION: 97 % | HEART RATE: 56 BPM | TEMPERATURE: 96.3 F | WEIGHT: 184 LBS | RESPIRATION RATE: 16 BRPM

## 2017-10-20 DIAGNOSIS — R19.7 DIARRHEA, UNSPECIFIED TYPE: ICD-10-CM

## 2017-10-20 DIAGNOSIS — R51.9 NONINTRACTABLE HEADACHE, UNSPECIFIED CHRONICITY PATTERN, UNSPECIFIED HEADACHE TYPE: ICD-10-CM

## 2017-10-20 DIAGNOSIS — G89.29 CHRONIC PAIN OF RIGHT KNEE: ICD-10-CM

## 2017-10-20 DIAGNOSIS — M25.561 CHRONIC PAIN OF RIGHT KNEE: ICD-10-CM

## 2017-10-20 DIAGNOSIS — Z23 ENCOUNTER FOR IMMUNIZATION: ICD-10-CM

## 2017-10-20 DIAGNOSIS — I10 ESSENTIAL HYPERTENSION: Primary | ICD-10-CM

## 2017-10-20 NOTE — PROGRESS NOTES
Chief Complaint   Patient presents with    Diarrhea     follow-up    Knee Pain    Hypertension     Patient in today for HTN, diarrhea and right knee pain follow-up. Per patient- he said that diarrhea has resolved. He has an appt next week with ortho for his right knee pain. He reports taking all his medications as prescribed. 1. Have you been to the ER, urgent care clinic since your last visit? Hospitalized since your last visit? No    2. Have you seen or consulted any other health care providers outside of the [address] [place] [address] since your last visit? Include any pap smears or colon screening. No     [facility]. comes in for follow-up care. Hypertension: Patient takes his blood pressure medication. His blood pressure has been stable. Will continue with the current treatment plan. Diarrhea: Patient had chronic diarrhea. We had placed referral for him to see the gastroenterologist.  Stool cultures were unrevealing. No abdominal pain and the diarrhea has resolved. She is eating and tolerating. Will continue to monitor and if symptoms recur he will come in for reevaluation. Headache: This now improved. He rarely gets headaches and does have Fioricet to use as needed for this. Knee pain: Patient has pain right knee with catching sensation. This has caused him to fall several times. Over the past few months since I last saw him he has not had a catching sensation or the pain. We have referred him to the orthopedic physician whom he sees next week.       Past Medical History  Past Medical History:   Diagnosis Date    BPH (benign prostatic hyperplasia)     Depression     Hypertension     Irritability and anger        Surgical History  Past Surgical History:   Procedure Laterality Date    COLONOSCOPY N/A 7/19/2017    COLONOSCOPY / polypectomy performed by Marisol Self MD at [address] [place]    plate placed in head due to injury    AR COLSC FLX W/REMOVAL LESION BY HOT BX FORCEPS N/A 07/19/2017    Dr. Edelmira Phan        Medications  Current Outpatient Prescriptions   Medication Sig Dispense Refill    metoprolol succinate (TOPROL-XL) 50 mg XL tablet TAKE 1 TABLET BY MOUTH DAILY 90 Tab 3    diclofenac EC (VOLTAREN) 50 mg EC tablet TAKE 1 TABLET BY MOUTH TWICE DAILY AS NEEDED 180 Tab 0    omeprazole (PRILOSEC) 20 mg capsule TAKE ONE CAPSULE BY MOUTH DAILY 90 Cap 0    hydroCHLOROthiazide (HYDRODIURIL) 25 mg tablet TAKE 1 TABLET BY MOUTH DAILY 60 Tab 0    losartan (COZAAR) 100 mg tablet TAKE 1 TABLET BY MOUTH DAILY FOR HIGH BLOOD PRESSURE 90 Tab 0    SUMAtriptan (IMITREX) 25 mg tablet Take 25 mg by mouth once as needed for Migraine.  codeine-butalbital-acetaminophen-caffeine (FIORICET WITH CODEINE) -41-30 mg per capsule Take 1 Cap by mouth every six (6) hours as needed for Headache or Migraine. Max Daily Amount: 4 Caps. 30 Cap 0    sertraline (ZOLOFT) 50 mg tablet Take 2 Tabs by mouth daily. 180 Tab 3    atorvastatin (LIPITOR) 40 mg tablet TAKE 1 TABLET BY MOUTH DAILY 90 Tab 2    amLODIPine (NORVASC) 10 mg tablet TK 1 T PO D FOR BP  2       Allergies  No Known Allergies    Family History  Family History   Problem Relation Age of Onset    Diabetes Mother     Heart Disease Father     Diabetes Father        Social History  Social History     Social History    Marital status: SINGLE     Spouse name: N/A    Number of children: N/A    Years of education: N/A     Occupational History    Not on file.      Social History Main Topics    Smoking status: Never Smoker    Smokeless tobacco: Never Used    Alcohol use No    Drug use: No    Sexual activity: Yes     Partners: Female     Other Topics Concern    Not on file     Social History Narrative       Review of Systems  Review of Systems - History obtained from chart review and the patient  General ROS: negative  Psychological ROS: positive for - behavioral disorder  Ophthalmic ROS: negative  ENT ROS: negative  Allergy and Immunology ROS: negative  Hematological and Lymphatic ROS: negative  Respiratory ROS: no cough, shortness of breath, or wheezing  Cardiovascular ROS: no chest pain or dyspnea on exertion  Gastrointestinal ROS: no abdominal pain, change in bowel habits, or black or bloody stools  Genito-Urinary ROS: negative  Musculoskeletal ROS: positive for - joint pain and pain in knee - right  Neurological ROS: no TIA or stroke symptoms    Vital Signs  Visit Vitals    /84 (BP 1 Location: Left arm, BP Patient Position: Sitting)    Pulse (!) 56    Temp 96.3 °F (35.7 °C) (Oral)    Resp 16    Ht 5' 5\" (1.651 m)    Wt 184 lb (83.5 kg)    SpO2 97%    BMI 30.62 kg/m2         Physical Exam  Physical Examination: General appearance - oriented to person, place, and time, acyanotic, in no respiratory distress and well hydrated  Mental status - alert, oriented to person, place, and time, affect appropriate to mood  Mouth - mucous membranes moist, pharynx normal without lesions  Neck - supple, no significant adenopathy  Lymphatics - no palpable lymphadenopathy  Chest - clear to auscultation, no wheezes, rales or rhonchi, symmetric air entry, no tachypnea, retractions or cyanosis  Heart - normal rate and regular rhythm, S1 and S2 normal  Neurological - alert, oriented, normal speech, no focal findings or movement disorder noted  Musculoskeletal - osteoarthritic changes noted in both hands, right knee with limited range of motion especially flexion due to discomfort. Ligamentous exam is stable  Extremities - no pedal edema noted, intact peripheral pulses    Diagnostics  No orders of the defined types were placed in this encounter.         Results  Results for orders placed or performed in visit on 09/27/17   CULTURE, STOOL   Result Value Ref Range    Salmonella/Shigella Screen CANCELED     Campylobacter Culture CANCELED     E coli Shiga toxin CANCELED    CULTURE, STOOL   Result Value Ref Range Salmonella/Shigella Screen CANCELED     Campylobacter Culture CANCELED     E coli Shiga toxin CANCELED    CBC WITH AUTOMATED DIFF   Result Value Ref Range    WBC 3.1 (L) 3.4 - 10.8 x10E3/uL    RBC 4.71 4.14 - 5.80 x10E6/uL    HGB 14.0 12.6 - 17.7 g/dL    HCT 42.1 37.5 - 51.0 %    MCV 89 79 - 97 fL    MCH 29.7 26.6 - 33.0 pg    MCHC 33.3 31.5 - 35.7 g/dL    RDW 14.8 12.3 - 15.4 %    PLATELET 524 675 - 478 x10E3/uL    NEUTROPHILS 41 %    Lymphocytes 45 %    MONOCYTES 9 %    EOSINOPHILS 5 %    BASOPHILS 0 %    ABS. NEUTROPHILS 1.3 (L) 1.4 - 7.0 x10E3/uL    Abs Lymphocytes 1.4 0.7 - 3.1 x10E3/uL    ABS. MONOCYTES 0.3 0.1 - 0.9 x10E3/uL    ABS. EOSINOPHILS 0.1 0.0 - 0.4 x10E3/uL    ABS. BASOPHILS 0.0 0.0 - 0.2 x10E3/uL    IMMATURE GRANULOCYTES 0 %    ABS. IMM. GRANS. 0.0 0.0 - 0.1 [id number]   METABOLIC PANEL, COMPREHENSIVE   Result Value Ref Range    Glucose 95 65 - 99 mg/dL    BUN 10 8 - 27 mg/dL    Creatinine 0.91 0.76 - 1.27 mg/dL    GFR est non-AA 87 >59 mL/min/1.73    GFR est  >59 mL/min/1.73    BUN/Creatinine ratio 11 10 - 24    Sodium 142 134 - 144 mmol/L    Potassium 3.8 3.5 - 5.2 mmol/L    Chloride 98 96 - 106 mmol/L    CO2 25 18 - 29 mmol/L    Calcium 9.4 8.6 - 10.2 mg/dL    Protein, total 7.8 6.0 - 8.5 g/dL    Albumin 4.5 3.6 - 4.8 g/dL    GLOBULIN, TOTAL 3.3 1.5 - 4.5 g/dL    A-G Ratio 1.4 1.2 - 2.2    Bilirubin, total 0.4 0.0 - 1.2 mg/dL    Alk. phosphatase 106 39 - 117 IU/L    AST (SGOT) 26 0 - 40 IU/L    ALT (SGPT) 23 0 - 44 IU/L   LIPID PANEL   Result Value Ref Range    Cholesterol, total 123 100 - 199 mg/dL    Triglyceride 212 (H) 0 - 149 mg/dL    HDL Cholesterol 42 >39 mg/dL    VLDL, calculated 42 (H) 5 - 40 mg/dL    LDL, calculated 39 0 - 99 mg/dL   CVD REPORT   Result Value Ref Range    INTERPRETATION Note    PLEASE NOTE   Result Value Ref Range    Please note Comment    PLEASE NOTE   Result Value Ref Range    Please note Comment      ASSESSMENT and PLAN    ICD-10-CM ICD-9-CM    1.  Essential hypertension I10 401.9    2. Encounter for immunization Z23 V03.89 INFLUENZA VIRUS VACCINE, HIGH DOSE SEASONAL, PRESERVATIVE FREE      ADMIN INFLUENZA VIRUS VAC   3. Nonintractable headache, unspecified chronicity pattern, unspecified headache type R51 784.0    4. Chronic pain of right knee M25.561 719.46     G89.29 338.29    5. Diarrhea, unspecified type R19.7 787.91      current treatment plan is effective, no change in therapy  reviewed diet, exercise and weight control  reviewed medications and side effects in detail    I have discussed the diagnosis with the patient and the intended plan of care as seen in the above orders. The patient has received an after-visit summary and questions were answered concerning future plans. I have discussed medication, side effects, and warnings with the patient in detail. The patient verbalized understanding and is in agreement with the plan of care. The patient will contact the office with any additional concerns.     Trevor Edwards MD

## 2017-10-20 NOTE — PROGRESS NOTES
Patient was seen in the office at this time. Patient states diarrhea has improved at this time.  Gastro referral was submitted

## 2017-10-20 NOTE — MR AVS SNAPSHOT
Visit Information Date & Time Provider Department Dept. Phone Encounter #  
 10/20/2017 10:00 AM Hesed Tess [address] [facility] [place] [phone] [id number] Follow-up Instructions Return if symptoms worsen or fail to improve. Your Appointments 10/25/2017  2:10 PM  
New Patient with Rebekah Cheney MD  
[address] [facility], [address] and [facility] - [facility] ([address]) Appt Note: CHRONIC RIGHT KNEE PAIN/ REF BY DR RICHARDS/ NO XRAYS/*PT AWARE THAT AN INSURANCE REFERRAL IS NEEDED FOR THIS APPOINTMENT AND THE FAX # WAS GIVEN FOR THEM TO GET THE REFERRAL FAXED OVER TO THE OFFICE./*ADVISED PT TO COME EARLY W. PHOTO ID & INS. CARD, CURRENT MEDICATION LIST & DOSAGE TO [place] [address], [address] [facility]  
[phone] [address], [address]  
  
    
 2/20/2018 10:00 AM  
Office Visit with MD Tess Garay [address] [facility] ([address] [facility]) Appt Note:   
 [address] [facility] [address] [facility]  
  
   
 [address] Upcoming Health Maintenance Date Due  
 GLAUCOMA SCREENING Q2Y 12/12/2014 INFLUENZA AGE 9 TO ADULT 8/1/2017 ZOSTER VACCINE AGE 60> 10/20/2018* Pneumococcal 65+ Low/Medium Risk (2 of 2 - PPSV23) 1/19/2018 MEDICARE YEARLY EXAM 2/21/2018 COLONOSCOPY 7/19/2022 DTaP/Tdap/Td series (2 - Td) 7/12/2027 *Topic was postponed. The date shown is not the original due date. Allergies as of 10/20/2017  Review Complete On: 10/20/2017 By: Toy Rodriguez MD  
 No Known Allergies Current Immunizations  Reviewed on 7/12/2017 Name Date Tdap 7/12/2017 Not reviewed this visit Vitals BP Pulse Temp Resp Height(growth percentile) Weight(growth percentile)  148/84 (BP 1 Location: Left arm, BP Patient Position: Sitting) (!) 56 96.3 °F (35.7 °C) (Oral) 16 5' 5\" (1.651 m) 184 lb (83.5 kg) SpO2 BMI Smoking Status 97% 30.62 kg/m2 Never Smoker BMI and BSA Data Body Mass Index Body Surface Area  
 30.62 kg/m 2 1.96 m 2 Preferred Pharmacy Pharmacy Name Phone [address] [facility], [address] [place] [address] [phone] Your Updated Medication List  
  
   
This list is accurate as of: 10/20/17 10:58 AM.  Always use your most recent med list. amLODIPine 10 mg tablet Commonly known as:  Monica Morfinin TK 1 T PO D FOR BP  
  
 atorvastatin 40 mg tablet Commonly known as:  LIPITOR  
TAKE 1 TABLET BY MOUTH DAILY  
  
 codeine-butalbital-acetaminophen-caffeine -46-30 mg per capsule Commonly known as:  FIORICET WITH CODEINE Take 1 Cap by mouth every six (6) hours as needed for Headache or Migraine. Max Daily Amount: 4 Caps. diclofenac EC 50 mg EC tablet Commonly known as:  VOLTAREN  
TAKE 1 TABLET BY MOUTH TWICE DAILY AS NEEDED  
  
 hydroCHLOROthiazide 25 mg tablet Commonly known as:  HYDRODIURIL  
TAKE 1 TABLET BY MOUTH DAILY losartan 100 mg tablet Commonly known as:  COZAAR  
TAKE 1 TABLET BY MOUTH DAILY FOR HIGH BLOOD PRESSURE  
  
 metoprolol succinate 50 mg XL tablet Commonly known as:  TOPROL-XL  
TAKE 1 TABLET BY MOUTH DAILY  
  
 omeprazole 20 mg capsule Commonly known as:  PRILOSEC  
TAKE ONE CAPSULE BY MOUTH DAILY  
  
 sertraline 50 mg tablet Commonly known as:  ZOLOFT Take 2 Tabs by mouth daily. SUMAtriptan 25 mg tablet Commonly known as:  IMITREX Take 25 mg by mouth once as needed for Migraine. Follow-up Instructions Return if symptoms worsen or fail to improve. Introducing [facility]! Wyatt Powers introduces [employer] patient portal. Now you can access parts of your medical record, email your doctor's office, and request medication refills online. 1. In your internet browser, go to [email or web address] 2. Click on the First Time User? Click Here link in the Sign In box. You will see the New Member Sign Up page. 3. Enter your [employer] Access Code exactly as it appears below. You will not need to use this code after youve completed the sign-up process. If you do not sign up before the expiration date, you must request a new code. · [employer] Access Code: [email or web address] Expires: 12/26/2017 12:11 PM 
 
4. Enter the last four digits of your Social Security Number (xxxx) and Date of Birth (mm/dd/yyyy) as indicated and click Submit. You will be taken to the next sign-up page. 5. Create a Pint Pleaset ID. This will be your [employer] login ID and cannot be changed, so think of one that is secure and easy to remember. 6. Create a [employer] password. You can change your password at any time. 7. Enter your Password Reset Question and Answer. This can be used at a later time if you forget your password. 8. Enter your e-mail address. You will receive e-mail notification when new information is available in [address] 9. Click Sign Up. You can now view and download portions of your medical record. 10. Click the Download Summary menu link to download a portable copy of your medical information. If you have questions, please visit the Frequently Asked Questions section of the [employer] website. Remember, [employer] is NOT to be used for urgent needs. For medical emergencies, dial 911. Now available from your iPhone and Android! Please provide this summary of care documentation to your next provider. Your primary care clinician is listed as Cami Mcgee. If you have any questions after today's visit, please call [phone].

## 2017-10-20 NOTE — PROGRESS NOTES
Javier Michel. is a 78 y.o. male who presents for routine immunizations. He denies any symptoms , reactions or allergies that would exclude them from being immunized today. Risks and adverse reactions were discussed and the VIS was given to them. All questions were addressed. He was observed for 15min post injection. There were no reactions observed.     Samaria Hoyos LPN

## 2017-10-25 ENCOUNTER — OFFICE VISIT (OUTPATIENT)
Dept: ORTHOPEDIC SURGERY | Age: 67
End: 2017-10-25

## 2017-10-25 VITALS
RESPIRATION RATE: 18 BRPM | TEMPERATURE: 98.5 F | HEIGHT: 65 IN | BODY MASS INDEX: 31.29 KG/M2 | OXYGEN SATURATION: 99 % | HEART RATE: 55 BPM | SYSTOLIC BLOOD PRESSURE: 170 MMHG | WEIGHT: 187.8 LBS | DIASTOLIC BLOOD PRESSURE: 95 MMHG

## 2017-10-25 DIAGNOSIS — M17.11 PRIMARY OSTEOARTHRITIS OF RIGHT KNEE: ICD-10-CM

## 2017-10-25 DIAGNOSIS — S83.241A ACUTE MEDIAL MENISCUS TEAR, RIGHT, INITIAL ENCOUNTER: Primary | ICD-10-CM

## 2017-10-25 RX ORDER — ATENOLOL 50 MG/1
TABLET ORAL DAILY
COMMUNITY
End: 2018-02-20

## 2017-10-25 RX ORDER — AMLODIPINE BESYLATE 10 MG/1
TABLET ORAL
Qty: 90 TAB | Refills: 0 | Status: SHIPPED | OUTPATIENT
Start: 2017-10-25 | End: 2018-03-20 | Stop reason: SDUPTHER

## 2017-10-25 NOTE — PATIENT INSTRUCTIONS
Arthritis: Care Instructions  Your Care Instructions  Arthritis, also called osteoarthritis, is a breakdown of the cartilage that cushions your joints. When the cartilage wears down, your bones rub against each other. This causes pain and stiffness. Many people have some arthritis as they age. Arthritis most often affects the joints of the spine, hands, hips, knees, or feet. You can take simple measures to protect your joints, ease your pain, and help you stay active. Follow-up care is a key part of your treatment and safety. Be sure to make and go to all appointments, and call your doctor if you are having problems. It's also a good idea to know your test results and keep a list of the medicines you take. How can you care for yourself at home? · Stay at a healthy weight. Being overweight puts extra strain on your joints. · Talk to your doctor or physical therapist about exercises that will help ease joint pain. ¨ Stretch. You may enjoy gentle forms of yoga to help keep your joints and muscles flexible. ¨ Walk instead of jog. Other types of exercise that are less stressful on the joints include riding a bicycle, swimming, pretty chi, or water exercise. ¨ Lift weights. Strong muscles help reduce stress on your joints. Stronger thigh muscles, for example, take some of the stress off of the knees and hips. Learn the right way to lift weights so you do not make joint pain worse. · Take your medicines exactly as prescribed. Call your doctor if you think you are having a problem with your medicine. · Take pain medicines exactly as directed. ¨ If the doctor gave you a prescription medicine for pain, take it as prescribed. ¨ If you are not taking a prescription pain medicine, ask your doctor if you can take an over-the-counter medicine. · Use a cane, crutch, walker, or another device if you need help to get around. These can help rest your joints.  You also can use other things to make life easier, such as a higher toilet seat and padded handles on kitchen utensils. · Do not sit in low chairs, which can make it hard to get up. · Put heat or cold on your sore joints as needed. Use whichever helps you most. You also can take turns with hot and cold packs. ¨ Apply heat 2 or 3 times a day for 20 to 30 minutes--using a heating pad, hot shower, or hot pack--to relieve pain and stiffness. ¨ Put ice or a cold pack on your sore joint for 10 to 20 minutes at a time. Put a thin cloth between the ice and your skin. When should you call for help? Call your doctor now or seek immediate medical care if:  · You have sudden swelling, warmth, or pain in any joint. · You have joint pain and a fever or rash. · You have such bad pain that you cannot use a joint. Watch closely for changes in your health, and be sure to contact your doctor if:  · You have mild joint symptoms that continue even with more than 6 weeks of care at home. · You have stomach pain or other problems with your medicine. Where can you learn more? Go to http://lavonne-frances.info/. Enter W847 in the search box to learn more about \"Arthritis: Care Instructions. \"  Current as of: November 28, 2016  Content Version: 11.3  © 4622-7372 [employer]. Care instructions adapted under license by [employer] (which disclaims liability or warranty for this information). If you have questions about a medical condition or this instruction, always ask your healthcare professional. [place] [address] any warranty or liability for your use of this information.

## 2017-10-25 NOTE — PROGRESS NOTES
[facility].  1950   Chief Complaint   Patient presents with    Knee Pain     Right        HISTORY OF PRESENT ILLNESS  Liset Shepherd is a 78 y.o. male who presents today for evaluation of right knee pain. Patient referred by Dr. Juarez Herzog for further evaluation. he rates his pain 0/10 today. Pain has been present for 9 months. Patient describes the pain as aching, tightness and throbbing that is Constant in nature. Symptoms are worse with prolonged walking/standing and is better with  Rest. Associated symptoms include giving way. Since problem started, it: has worsened. Pain does not wake patient up at night. Has taken Voltaren for the problem. Has tried following treatments: Injections:NO; Brace:NO; Therapy:NO; Cane/Crutch:NO       No Known Allergies     Past Medical History:   Diagnosis Date    BPH (benign prostatic hyperplasia)     Depression     Hypertension     Irritability and anger       Social History     Social History    Marital status: SINGLE     Spouse name: N/A    Number of children: N/A    Years of education: N/A     Occupational History    Not on file. Social History Main Topics    Smoking status: Never Smoker    Smokeless tobacco: Never Used    Alcohol use No    Drug use: No    Sexual activity: Yes     Partners: Female     Other Topics Concern    Not on file     Social History Narrative      Past Surgical History:   Procedure Laterality Date    COLONOSCOPY N/A 7/19/2017    COLONOSCOPY / polypectomy performed by Josefina Ambriz MD at [facility] ENDOSCOPY    HX OTHER SURGICAL  1993    plate placed in head due to injury    [place] COLSC FLX W/REMOVAL LESION BY HOT BX FORCEPS N/A 07/19/2017    Dr. Chun West      Family History   Problem Relation Age of Onset    Diabetes Mother     Heart Disease Father     Diabetes Father       Current Outpatient Prescriptions   Medication Sig    atenolol (TENORMIN) 50 mg tablet Take  by mouth daily.     diclofenac EC (VOLTAREN) 50 mg EC tablet TAKE 1 TABLET BY MOUTH TWICE DAILY AS NEEDED    omeprazole (PRILOSEC) 20 mg capsule TAKE ONE CAPSULE BY MOUTH DAILY    hydroCHLOROthiazide (HYDRODIURIL) 25 mg tablet TAKE 1 TABLET BY MOUTH DAILY    losartan (COZAAR) 100 mg tablet TAKE 1 TABLET BY MOUTH DAILY FOR HIGH BLOOD PRESSURE    SUMAtriptan (IMITREX) 25 mg tablet Take 25 mg by mouth once as needed for Migraine.  codeine-butalbital-acetaminophen-caffeine (FIORICET WITH CODEINE) -75-30 mg per capsule Take 1 Cap by mouth every six (6) hours as needed for Headache or Migraine. Max Daily Amount: 4 Caps.  sertraline (ZOLOFT) 50 mg tablet Take 2 Tabs by mouth daily.  atorvastatin (LIPITOR) 40 mg tablet TAKE 1 TABLET BY MOUTH DAILY    amLODIPine (NORVASC) 10 mg tablet TAKE 1 TABLET BY MOUTH DAILY FOR BLOOD PRESSURE    metoprolol succinate (TOPROL-XL) 50 mg XL tablet TAKE 1 TABLET BY MOUTH DAILY     No current facility-administered medications for this visit. REVIEW OF SYSTEM   Patient denies: Weight loss, Fever/Chills, HA, Visual changes, Fatigue, Chest pain, SOB, Abdominal pain, N/V/D/C, Blood in stool or urine, Edema. Pertinent positive as above in HPI. All others were negative    PHYSICAL EXAM:   Visit Vitals    BP (!) 170/95    Pulse (!) 55    Temp 98.5 °F (36.9 °C) (Oral)    Resp 18    Ht 5' 5\" (1.651 m)    Wt 187 lb 12.8 oz (85.2 kg)    SpO2 99%    BMI 31.25 kg/m2     The patient is a well-developed, well-nourished male   in no acute distress. The patient is alert and oriented times three. The patient is alert and oriented times three. Mood and affect are normal.  LYMPHATIC: lymph nodes are not enlarged and are within normal limits  SKIN: normal in color and non tender to palpation. There are no bruises or abrasions noted. NEUROLOGICAL: Motor sensory exam is within normal limits. Reflexes are equal bilaterally.  There is normal sensation to pinprick and light touch  MUSCULOSKELETAL:  Examination Right knee Skin Intact   Range of motion 0-130   Effusion +   Medial joint line tenderness +   Lateral joint line tenderness -   Tenderness Pes Bursa -   Tenderness insertion MCL -   Tenderness insertion LCL -   Hollys +   Patella crepitus -   Patella grind -   Lachman -   Pivot shift -   Anterior drawer -   Posterior drawer -   Varus stress -   Valgus stress -   Neurovascular Intact   Calf Swelling and Tenderness to Palpation -   Jose A's Test -   Hamstring Cord Tightness -          IMAGING:   XR of the right knee dated 6/9/17 was reviewed and read:   IMPRESSION:  1. No acute pathology appreciated in the right knee. 2.  Mild degenerative changes of the knee. IMPRESSION:      ICD-10-CM ICD-9-CM    1. Acute medial meniscus tear, right, initial encounter S83.241A 836.0 MRI KNEE RT WO CONT   2. Primary osteoarthritis of right knee M17.11 715.16         PLAN:  1. I am concerned about a possible meniscus tear of the right knee. Risk factors include: htn  2. No cortisone injection indicated today   3. No Physical/Occupational Therapy indicated today  4. Yes diagnostic test indicated today: MRI RIGHT KNEE  5. No durable medical equipment indicated today  6. No referral indicated today   7. No medications indicated today  8. No Narcotic indicated today     RTC following completion of the MRI  Office note will be sent to the referring provider. Follow-up Disposition: Not on File    Scribed by Lacy Garzon [facility]) as dictated by Rebekah Cheney MD    I, Dr. Rebekah Cheney, confirm that all documentation is accurate.     Rebekah Cheney M.D.   [facility]

## 2017-11-03 ENCOUNTER — HOSPITAL ENCOUNTER (OUTPATIENT)
Age: 67
Discharge: HOME | End: 2017-11-03
Attending: ORTHOPAEDIC SURGERY
Payer: MEDICARE

## 2017-11-03 DIAGNOSIS — S83.241A ACUTE MEDIAL MENISCUS TEAR, RIGHT, INITIAL ENCOUNTER: ICD-10-CM

## 2017-11-03 PROCEDURE — 73721 MRI JNT OF LWR EXTRE W/O DYE: CPT

## 2017-12-10 DIAGNOSIS — I10 ESSENTIAL HYPERTENSION: ICD-10-CM

## 2017-12-10 DIAGNOSIS — F39 MOOD DISORDER (HCC): ICD-10-CM

## 2017-12-11 RX ORDER — HYDROCHLOROTHIAZIDE 25 MG/1
TABLET ORAL
Qty: 60 TAB | Refills: 0 | Status: SHIPPED | OUTPATIENT
Start: 2017-12-11 | End: 2018-03-12 | Stop reason: SDUPTHER

## 2017-12-11 RX ORDER — SERTRALINE HYDROCHLORIDE 50 MG/1
TABLET, FILM COATED ORAL
Qty: 135 TAB | Refills: 0 | Status: SHIPPED | OUTPATIENT
Start: 2017-12-11 | End: 2018-02-20 | Stop reason: SDUPTHER

## 2017-12-18 DIAGNOSIS — R51.9 HEADACHE, UNSPECIFIED HEADACHE TYPE: ICD-10-CM

## 2017-12-18 RX ORDER — BUTALBITAL, ACETAMINOPHEN, CAFFEINE AND CODEINE PHOSPHATE 50; 325; 40; 30 MG/1; MG/1; MG/1; MG/1
CAPSULE ORAL
Qty: 30 CAP | Refills: 0 | Status: SHIPPED | OUTPATIENT
Start: 2017-12-18 | End: 2019-10-03 | Stop reason: ALTCHOICE

## 2017-12-26 DIAGNOSIS — I10 ESSENTIAL HYPERTENSION: ICD-10-CM

## 2017-12-26 DIAGNOSIS — E78.5 DYSLIPIDEMIA: ICD-10-CM

## 2017-12-27 ENCOUNTER — DOCUMENTATION ONLY (OUTPATIENT)
Dept: ORTHOPEDIC SURGERY | Age: 67
End: 2017-12-27

## 2017-12-27 RX ORDER — LOSARTAN POTASSIUM 100 MG/1
TABLET ORAL
Qty: 90 TAB | Refills: 0 | Status: SHIPPED | OUTPATIENT
Start: 2017-12-27 | End: 2018-05-26 | Stop reason: SDUPTHER

## 2017-12-27 RX ORDER — ATORVASTATIN CALCIUM 40 MG/1
TABLET, FILM COATED ORAL
Qty: 90 TAB | Refills: 0 | Status: SHIPPED | OUTPATIENT
Start: 2017-12-27

## 2017-12-27 RX ORDER — OMEPRAZOLE 20 MG/1
CAPSULE, DELAYED RELEASE ORAL
Qty: 90 CAP | Refills: 0 | Status: SHIPPED | OUTPATIENT
Start: 2017-12-27 | End: 2018-04-29 | Stop reason: SDUPTHER

## 2018-02-20 ENCOUNTER — OFFICE VISIT (OUTPATIENT)
Dept: FAMILY MEDICINE CLINIC | Age: 68
End: 2018-02-20

## 2018-02-20 VITALS
OXYGEN SATURATION: 94 % | SYSTOLIC BLOOD PRESSURE: 190 MMHG | BODY MASS INDEX: 31.49 KG/M2 | RESPIRATION RATE: 18 BRPM | HEART RATE: 81 BPM | WEIGHT: 189 LBS | HEIGHT: 65 IN | TEMPERATURE: 97 F | DIASTOLIC BLOOD PRESSURE: 109 MMHG

## 2018-02-20 DIAGNOSIS — R51.9 NONINTRACTABLE HEADACHE, UNSPECIFIED CHRONICITY PATTERN, UNSPECIFIED HEADACHE TYPE: ICD-10-CM

## 2018-02-20 DIAGNOSIS — Z13.5 SCREENING FOR GLAUCOMA: ICD-10-CM

## 2018-02-20 DIAGNOSIS — R19.4 CHANGE IN BOWEL HABITS: ICD-10-CM

## 2018-02-20 DIAGNOSIS — I10 ESSENTIAL HYPERTENSION: Primary | ICD-10-CM

## 2018-02-20 DIAGNOSIS — H61.21 IMPACTED CERUMEN OF RIGHT EAR: ICD-10-CM

## 2018-02-20 DIAGNOSIS — Z71.89 ACP (ADVANCE CARE PLANNING): ICD-10-CM

## 2018-02-20 DIAGNOSIS — I10 ESSENTIAL HYPERTENSION: ICD-10-CM

## 2018-02-20 DIAGNOSIS — Z23 ENCOUNTER FOR IMMUNIZATION: ICD-10-CM

## 2018-02-20 DIAGNOSIS — F39 MOOD DISORDER (HCC): ICD-10-CM

## 2018-02-20 DIAGNOSIS — Z00.00 ENCOUNTER FOR MEDICARE ANNUAL WELLNESS EXAM: ICD-10-CM

## 2018-02-20 DIAGNOSIS — Z12.5 SCREENING FOR PROSTATE CANCER: ICD-10-CM

## 2018-02-20 RX ORDER — HYDRALAZINE HYDROCHLORIDE 25 MG/1
TABLET, FILM COATED ORAL
Qty: 180 TAB | Refills: 2 | Status: SHIPPED | OUTPATIENT
Start: 2018-02-20 | End: 2018-04-02 | Stop reason: SDUPTHER

## 2018-02-20 RX ORDER — HYDRALAZINE HYDROCHLORIDE 25 MG/1
25 TABLET, FILM COATED ORAL 2 TIMES DAILY
Qty: 60 TAB | Refills: 2 | Status: SHIPPED | OUTPATIENT
Start: 2018-02-20 | End: 2018-02-20 | Stop reason: SDUPTHER

## 2018-02-20 NOTE — PROGRESS NOTES
HPI  [employer]. comes in for follow-up care. Hypertension: Patient's blood pressure remains elevated. He has been taking his medications as prescribed. He is on losartan 100 mg daily, HCTZ 25 mg daily, amlodipine 10 mg daily and Toprol-XL 50 mg daily. He does have headaches on and off. No visual changes or focal weakness. I will start him on hydralazine 25 mg twice a day in addition to the rest of his blood pressure medications. I will follow-up for blood pressure check in 6 days. Mood disorder: Patient has been having anger issues. This is due to a lot of domestic issues with a stepchild. His stepson is disrespectful and the demanding. This creates a lot of friction with his wife being overwhelmed and distraught as that is her son. This is causing him to become very angry. He does take his Zoloft and this helps him calm down a bit. I will follow-up at next visit. At the moment we will continue with the current treatment plan. If his symptoms persist we may need to adjust medications. Gastrointestinal: Patient has a change in bowel habits. He did have a colonoscopy done last year. Was recommended to get a recheck in 5 years. However he has been having changes in bowel habits with the stool consistency and the shape having changed. At times he gets loose stool at a time because thin stringy stools. He has also noticed blood with his stool. I will refer him to the gastroenterologist for an evaluation. May needs to have a recheck colonoscopy. He has had a history of colon polyps in the past.  Impacted cerumen: Patient has cerumen impaction in his right ear. He has diminished hearing in that ear. Will give Debrox eardrops for him to apply in the when he comes back and we will do ear irrigation. If his hearing loss persists then will consider referral to ENT for formal hearing screen. Headache: Patient has intractable headache that is migrainous in nature.   He has had these in the past.  She takes Fioricet with good result. He still has some Fioricet. Will let us know if he runs out and I will send in refill of prescriptions. Past Medical History  Past Medical History:   Diagnosis Date    BPH (benign prostatic hyperplasia)     Depression     Hypertension     Irritability and anger        Surgical History  Past Surgical History:   Procedure Laterality Date    COLONOSCOPY N/A 7/19/2017    COLONOSCOPY / polypectomy performed by Lidia Hernandez MD at [facility] OTHER SURGICAL  1993    plate placed in head due to injury    AK COLSC FLX W/REMOVAL LESION BY HOT BX FORCEPS N/A 07/19/2017    Dr. Bernie Cardoso        Medications  Current Outpatient Prescriptions   Medication Sig Dispense Refill    hydrALAZINE (APRESOLINE) 25 mg tablet Take 1 Tab by mouth two (2) times a day. Indications: hypertension 60 Tab 2    carbamide peroxide (DEBROX) 6.5 % otic solution Administer 5 Drops into each ear two (2) times a day. 7.5 mL 0    omeprazole (PRILOSEC) 20 mg capsule TAKE ONE CAPSULE BY MOUTH DAILY 90 Cap 0    losartan (COZAAR) 100 mg tablet TAKE 1 TABLET BY MOUTH DAILY FOR HIGH BLOOD PRESSURE 90 Tab 0    atorvastatin (LIPITOR) 40 mg tablet TAKE 1 TABLET BY MOUTH DAILY 90 Tab 0    codeine-butalbital-acetaminophen-caffeine (FIORICET WITH CODEINE) -03-30 mg capsule TAKE 1 CAPSULE BY MOUTH EVERY 6 HOURS AS NEEDED FOR HEADACHE OR MIGRAINE 30 Cap 0    hydroCHLOROthiazide (HYDRODIURIL) 25 mg tablet TAKE 1 TABLET BY MOUTH DAILY 60 Tab 0    amLODIPine (NORVASC) 10 mg tablet TAKE 1 TABLET BY MOUTH DAILY FOR BLOOD PRESSURE 90 Tab 0    metoprolol succinate (TOPROL-XL) 50 mg XL tablet TAKE 1 TABLET BY MOUTH DAILY 90 Tab 3    sertraline (ZOLOFT) 50 mg tablet Take 2 Tabs by mouth daily.  180 Tab 3       Allergies  No Known Allergies    Family History  Family History   Problem Relation Age of Onset    Diabetes Mother     Heart Disease Father     Diabetes Father        Social History  Social History     Social History    Marital status: SINGLE     Spouse name: N/A    Number of children: N/A    Years of education: N/A     Occupational History    Not on file.      Social History Main Topics    Smoking status: Never Smoker    Smokeless tobacco: Never Used    Alcohol use No    Drug use: No    Sexual activity: Yes     Partners: Female     Other Topics Concern    Not on file     Social History Narrative       Review of Systems  Review of Systems - History obtained from chart review and the patient  General ROS: positive for  - fatigue, malaise and sleep disturbance  Psychological ROS: positive for - behavioral disorder and mood swings  Ophthalmic ROS: negative for - blurry vision  ENT ROS: positive for - headaches  Allergy and Immunology ROS: negative  Hematological and Lymphatic ROS: negative  Endocrine ROS: negative  Respiratory ROS: no cough, shortness of breath, or wheezing  Cardiovascular ROS: no chest pain or dyspnea on exertion  Gastrointestinal ROS: positive for - blood in stools, change in bowel habits and change in stools  negative for - abdominal pain, gas/bloating, heartburn or hematemesis  Genito-Urinary ROS: no dysuria, trouble voiding, or hematuria  Musculoskeletal ROS: negative  Neurological ROS: positive for - headaches  negative for - impaired coordination/balance, numbness/tingling or weakness    Vital Signs  Visit Vitals    BP (!) 190/109 (BP 1 Location: Left arm, BP Patient Position: Sitting)    Pulse 81    Temp 97 °F (36.1 °C) (Oral)    Resp 18    Ht 5' 5\" (1.651 m)    Wt 189 lb (85.7 kg)    SpO2 94%    BMI 31.45 kg/m2         Physical Exam  Physical Examination: General appearance - oriented to person, place, and time, acyanotic, in no respiratory distress and well hydrated  Mental status - alert, oriented to person, place, and time, affect appropriate to mood  Nose - normal and patent, no erythema, discharge or polyps and normal nontender sinuses  Mouth - mucous membranes moist, pharynx normal without lesions  Neck - supple, no significant adenopathy  Lymphatics - no palpable lymphadenopathy, no hepatosplenomegaly  Chest - clear to auscultation, no wheezes, rales or rhonchi, symmetric air entry, no tachypnea, retractions or cyanosis  Heart - normal rate and regular rhythm, S1 and S2 normal  Abdomen - tenderness noted generalized, no rebound or guarding  bowel sounds normal  Back exam - limited range of motion  Neurological - alert, oriented, normal speech, no focal findings or movement disorder noted, motor and sensory grossly normal bilaterally  Musculoskeletal - osteoarthritic changes noted in both hands  Extremities - no pedal edema noted, intact peripheral pulses    Results  Results for orders placed or performed in visit on 09/27/17   CULTURE, STOOL   Result Value Ref Range    Salmonella/Shigella Screen CANCELED     Campylobacter Culture CANCELED     E coli Shiga toxin CANCELED    CULTURE, STOOL   Result Value Ref Range    Salmonella/Shigella Screen CANCELED     Campylobacter Culture CANCELED     E coli Shiga toxin CANCELED    CBC WITH AUTOMATED DIFF   Result Value Ref Range    WBC 3.1 (L) 3.4 - 10.8 x10E3/uL    RBC 4.71 4.14 - 5.80 x10E6/uL    HGB 14.0 12.6 - 17.7 g/dL    HCT 42.1 37.5 - 51.0 %    MCV 89 79 - 97 fL    MCH 29.7 26.6 - 33.0 pg    MCHC 33.3 31.5 - 35.7 g/dL    RDW 14.8 12.3 - 15.4 %    PLATELET 115 585 - 518 x10E3/uL    NEUTROPHILS 41 %    Lymphocytes 45 %    MONOCYTES 9 %    EOSINOPHILS 5 %    BASOPHILS 0 %    ABS. NEUTROPHILS 1.3 (L) 1.4 - 7.0 x10E3/uL    Abs Lymphocytes 1.4 0.7 - 3.1 x10E3/uL    ABS. MONOCYTES 0.3 0.1 - 0.9 x10E3/uL    ABS. EOSINOPHILS 0.1 0.0 - 0.4 x10E3/uL    ABS. BASOPHILS 0.0 0.0 - 0.2 x10E3/uL    IMMATURE GRANULOCYTES 0 %    ABS. IMM.  GRANS. 0.0 0.0 - 0.1 [id number]   METABOLIC PANEL, COMPREHENSIVE   Result Value Ref Range    Glucose 95 65 - 99 mg/dL    BUN 10 8 - 27 mg/dL    Creatinine 0.91 0.76 - 1.27 mg/dL    GFR est non-AA 87 >59 mL/min/1.73    GFR est  >59 mL/min/1.73    BUN/Creatinine ratio 11 10 - 24    Sodium 142 134 - 144 mmol/L    Potassium 3.8 3.5 - 5.2 mmol/L    Chloride 98 96 - 106 mmol/L    CO2 25 18 - 29 mmol/L    Calcium 9.4 8.6 - 10.2 mg/dL    Protein, total 7.8 6.0 - 8.5 g/dL    Albumin 4.5 3.6 - 4.8 g/dL    GLOBULIN, TOTAL 3.3 1.5 - 4.5 g/dL    A-G Ratio 1.4 1.2 - 2.2    Bilirubin, total 0.4 0.0 - 1.2 mg/dL    Alk. phosphatase 106 39 - 117 IU/L    AST (SGOT) 26 0 - 40 IU/L    ALT (SGPT) 23 0 - 44 IU/L   LIPID PANEL   Result Value Ref Range    Cholesterol, total 123 100 - 199 mg/dL    Triglyceride 212 (H) 0 - 149 mg/dL    HDL Cholesterol 42 >39 mg/dL    VLDL, calculated 42 (H) 5 - 40 mg/dL    LDL, calculated 39 0 - 99 mg/dL   CVD REPORT   Result Value Ref Range    INTERPRETATION Note    PLEASE NOTE   Result Value Ref Range    Please note Comment    PLEASE NOTE   Result Value Ref Range    Please note Comment        ASSESSMENT and PLAN    ICD-10-CM ICD-9-CM    1. Essential hypertension I10 401.9 CBC WITH AUTOMATED DIFF      METABOLIC PANEL, BASIC      DISCONTINUED: hydrALAZINE (APRESOLINE) 25 mg tablet   2. Mood disorder (HCC) F39 296.90    3. Change in bowel habits R19.4 787.99 REFERRAL TO GASTROENTEROLOGY   4. Encounter for Medicare annual wellness exam Z00.00 V70.0    5. ACP (advance care planning) Z71.89 V65.49    6. Nonintractable headache, unspecified chronicity pattern, unspecified headache type R51 784.0    7. Impacted cerumen of right ear H61.21 380.4 carbamide peroxide (DEBROX) 6.5 % otic solution   8. Screening for glaucoma Z13.5 V80.1 REFERRAL TO OPHTHALMOLOGY   9. Screening for prostate cancer Z12.5 V76.44 PSA SCREENING (SCREENING)   10.  Encounter for immunization Z23 V03.89 PNEUMOCOCCAL POLYSACCHARIDE VACCINE, 23-VALENT, ADULT OR IMMUNOSUPPRESSED PT DOSE,      ADMIN PNEUMOCOCCAL VACCINE     lab results and schedule of future lab studies reviewed with patient  reviewed diet, exercise and weight control  cardiovascular risk and specific lipid/LDL goals reviewed  reviewed medications and side effects in detail    I have discussed the diagnosis with the patient and the intended plan of care as seen in the above orders. The patient has received an after-visit summary and questions were answered concerning future plans. I have discussed medication, side effects, and warnings with the patient in detail. The patient verbalized understanding and is in agreement with the plan of care. The patient will contact the office with any additional concerns.     Kris Gutierres MD

## 2018-02-20 NOTE — PATIENT INSTRUCTIONS
Medicare Wellness Visit, Male    The best way to live healthy is to have a healthy lifestyle by eating a well-balanced diet, exercising regularly, limiting alcohol and stopping smoking. Regular physical exams and screening tests are another way to keep healthy. Preventive exams provided by your health care provider can find health problems before they become diseases or illnesses. Preventive services including immunizations, screening tests, monitoring and exams can help you take care of your own health. All people over age 72 should have a pneumovax  and and a prevnar shot to prevent pneumonia. These are once in a lifetime unless you and your provider decide differently. All people over 65 should have a yearly flu shot and a tetanus vaccine every 10 years. Screening for diabetes mellitus with a blood sugar test should be done every year. Glaucoma is a disease of the eye due to increased ocular pressure that can lead to blindness and it should be done every year by an eye professional.    Cardiovascular screening tests that check for elevated lipids (fatty part of blood) which can lead to heart disease and strokes should be done every 5 years. Colorectal screening that evaluates for blood or polyps in your colon should be done yearly as a stool test or every five years as a flexible sigmoidoscope or every 10 years as a colonoscopy up to age 76. Men up to age 76 may need a screening blood test for prostate cancer at certain intervals, depending on their personal and family history. This decision is between the patient and his provider. If you have been a smoker or had family history of abdominal aortic aneurysms, you and your provider may decide to schedule an ultrasound test of your aorta. Hepatitis C screening is also recommended for anyone born between [birth year] through [address]. A shingles vaccine is also recommended once in a lifetime after age 61.     Your Medicare Wellness Exam is recommended annually. Here is a list of your current Health Maintenance items with a due date:  Health Maintenance Due   Topic Date Due    Glaucoma Screening   12/12/2014    Pneumococcal Vaccine (2 of 2 - PPSV23) 01/19/2018         Medicare Part B Preventive Services Limitations Recommendation Scheduled   Bone Mass Measurement  (age 72 & older, biennial) Requires diagnosis related to osteoporosis or estrogen deficiency. Biennial benefit unless patient has history of long-term glucocorticoid tx or baseline is needed because initial test was by other method N/A    Cardiovascular Screening Blood Tests (every 5 years)  Total cholesterol, HDL, Triglycerides Order as a panel if possible Due    Colorectal Cancer Screening  -Fecal occult blood test (annual)  -Flexible sigmoidoscopy (5y)  -Screening colonoscopy (10y)  -Barium Enema  UTD    Counseling to Prevent Tobacco Use (up to 8 sessions per year)  - Counseling greater than 3 and up to 10 minutes  - Counseling greater than 10 minutes Patients must be asymptomatic of tobacco-related conditions to receive as preventive service UTD    Diabetes Screening Tests (at least every 3 years, Medicare covers annually or at 6-month intervals for prediabetic patients)    Fasting blood sugar (FBS) or glucose tolerance test (GTT) Patient must be diagnosed with one of the following:  -Hypertension, Dyslipidemia, obesity, previous impaired FBS or GTT  Or any two of the following: overweight, FH of diabetes, age ? 72, history of gestational diabetes, birth of baby weighing more than 9 pounds Due    Diabetes Self-Management Training (DSMT) (no USPSTF recommendation) Requires referral by treating physician for patient with diabetes or renal disease. 10 hours of initial DSMT session of no less than 30 minutes each in a continuous 12-month period. 2 hours of follow-up DSMT in subsequent years.  N/A    Glaucoma Screening (no USPSTF recommendation) Diabetes mellitus, family history,  American, age 48 or over,  American, age 72 or over Due    Human Immunodeficiency Virus (HIV) Screening (annually for increased risk patients)  HIV-1 and HIV-2 by EIA, MERYL, rapid antibody test, or oral mucosa transudate Patient must be at increased risk for HIV infection per USPSTF guidelines or pregnant. Tests covered annually for patients at increased risk. Pregnant patients may receive up to 3 test during pregnancy. N/A    Medical Nutrition Therapy (MNT) (for diabetes or renal disease not recommended schedule) Requires referral by treating physician for patient with diabetes or renal disease. Can be provided in same year as diabetes self-management training (DSMT), and CMS recommends medical nutrition therapy take place after DSMT. Up to 3 hours for initial year and 2 hours in subsequent years. N/A    Prostate Cancer Screening (annually up to age 76)  - Digital rectal exam (SHAKIRA)  - Prostate specific antigen (PSA) Annually (age 48 or over), SHAKIRA not paid separately when covered E/M service is provided on same date  Men up to age 76 may need a screening blood test for prostate cancer at certain intervals, depending on their personal and family history. This decision is between the patient and his provider. Due    Seasonal Influenza Vaccination (annually)  UTD      Pneumococcal Vaccination (once after 72)    Due    Hepatitis B Vaccinations (if medium/high risk) Medium/high risk factors:  End-stage renal disease,  Hemophiliacs who received Factor VIII or IX concentrates, Clients of institutions for the mentally retarded, Persons who live in the same house as a HepB virus carrier, Homosexual men, Illicit injectable drug abusers.  N/A    Shingles Vaccination A shingles vaccine is also recommended once in a lifetime after age 61 Due    Ultrasound Screening for Abdominal Aortic Aneurysm (AAA) (once) Patient must be referred through [facility] and not have had a screening for abdominal aortic aneurysm before under Medicare. Limited to patients who meet one of the following criteria:  - Men who are 73-68 years old and have smoked more than 100 cigarettes in their lifetime.  -Anyone with a FH of AAA  -Anyone recommended for screening by USPSTF N/A           Earwax Blockage: Care Instructions  Your Care Instructions    Earwax is a natural substance that protects the ear canal. Normally, earwax drains from the ears and does not cause problems. Sometimes earwax builds up and hardens. Earwax blockage (also called cerumen impaction) can cause some loss of hearing and pain. When wax is tightly packed, you will need to have your doctor remove it. Follow-up care is a key part of your treatment and safety. Be sure to make and go to all appointments, and call your doctor if you are having problems. It's also a good idea to know your test results and keep a list of the medicines you take. How can you care for yourself at home? · Do not try to remove earwax with cotton swabs, fingers, or other objects. This can make the blockage worse and damage the eardrum. · If your doctor recommends that you try to remove earwax at home:  ¨ Soften and loosen the earwax with warm mineral oil. You also can try hydrogen peroxide mixed with an equal amount of room temperature water. Place 2 drops of the fluid, warmed to body temperature, in the ear two times a day for up to 5 days. ¨ Once the wax is loose and soft, all that is usually needed to remove it from the ear canal is a gentle, warm shower. Direct the water into the ear, then tip your head to let the earwax drain out. Dry your ear thoroughly with a hair dryer set on low. Hold the dryer several inches from your ear. ¨ If the warm mineral oil and shower do not work, use an over-the-counter wax softener followed by gentle flushing with an ear syringe each night for a week or two. Make sure the flushing solution is body temperature. Cool or hot fluids in the ear can cause dizziness.   When should you call for help? Call your doctor now or seek immediate medical care if:  ? · Pus or blood drains from your ear. ? · Your ears are ringing or feel full. ? · You have a loss of hearing. ? Watch closely for changes in your health, and be sure to contact your doctor if:  ? · You have pain or reduced hearing after 1 week of home treatment. ? · You have any new symptoms, such as nausea or balance problems. Where can you learn more? Go to http://lavonne-frances.info/. Enter T002 in the search box to learn more about \"Earwax Blockage: Care Instructions. \"  Current as of: March 20, 2017  Content Version: 11.4  © 5803-3680 [employer]. Care instructions adapted under license by [employer] (which disclaims liability or warranty for this information). If you have questions about a medical condition or this instruction, always ask your healthcare professional. [place] [address] any warranty or liability for your use of this information. Acute High Blood Pressure: Care Instructions  Your Care Instructions    Acute high blood pressure is very high blood pressure. It's a serious problem. Very high blood pressure can damage your brain, heart, eyes, and kidneys. You may have been given medicines to lower your blood pressure. You may have gotten them as pills or through a needle in one of your veins. This is called an IV. And maybe you were given other medicines too. These can be needed when high blood pressure causes other problems. To keep your blood pressure at a lower level, you may need to make healthy lifestyle changes. And you will probably need to take medicines. Be sure to follow up with your doctor about your blood pressure and what you can do about it. Follow-up care is a key part of your treatment and safety. Be sure to make and go to all appointments, and call your doctor if you are having problems.  It's also a good idea to know your test results and keep a list of the medicines you take. How can you care for yourself at home? · See your doctor as often as he or she recommends. This is to make sure your blood pressure is under control. You will probably go at least 2 times a year. But it may be more often at first.  · Take your blood pressure medicine exactly as prescribed. You may take one or more types. They include diuretics, beta-blockers, ACE inhibitors, calcium channel blockers, and angiotensin II receptor blockers. Call your doctor if you think you are having a problem with your medicine. · If you take blood pressure medicine, talk to your doctor before you take decongestants or anti-inflammatory medicine, such as ibuprofen. These can raise blood pressure. · Learn how to check your blood pressure at home. Check it often. · Ask your doctor if it's okay to drink alcohol. · Talk to your doctor about lifestyle changes that can help blood pressure. These include being active and not smoking. When should you call for help? Call 911 anytime you think you may need emergency care. This may mean having symptoms that suggest that your blood pressure is causing a serious heart or blood vessel problem. Your blood pressure may be over 180/110. ? For example, call 911 if:  ? · You have symptoms of a heart attack. These may include:  ¨ Chest pain or pressure, or a strange feeling in the chest.  ¨ Sweating. ¨ Shortness of breath. ¨ Nausea or vomiting. ¨ Pain, pressure, or a strange feeling in the back, neck, jaw, or upper belly or in one or both shoulders or arms. ¨ Lightheadedness or sudden weakness. ¨ A fast or irregular heartbeat. ? · You have symptoms of a stroke. These may include:  ¨ Sudden numbness, tingling, weakness, or loss of movement in your face, arm, or leg, especially on only one side of your body. ¨ Sudden vision changes. ¨ Sudden trouble speaking. ¨ Sudden confusion or trouble understanding simple statements.   ¨ Sudden problems with walking or balance. ¨ A sudden, severe headache that is different from past headaches. ? · You have severe back or belly pain. ?Do not wait until your blood pressure comes down on its own. Get help right away. ?Call your doctor now or seek immediate care if:  ? · Your blood pressure is much higher than normal (such as 180/110 or higher), but you don't have symptoms. ? · You think high blood pressure is causing symptoms, such as:  ¨ Severe headache. ¨ Blurry vision. ? Watch closely for changes in your health, and be sure to contact your doctor if:  ? · Your blood pressure measures 140/90 or higher at least 2 times. That means the top number is 140 or higher or the bottom number is 90 or higher, or both. ? · You think you may be having side effects from your blood pressure medicine. ? · Your blood pressure is usually normal, but it goes above normal at least 2 times. Where can you learn more? Go to http://lavonne-frances.info/. Enter E630 in the search box to learn more about \"Acute High Blood Pressure: Care Instructions. \"  Current as of: September 21, 2016  Content Version: 11.4  © 4371-3753 [employer]. Care instructions adapted under license by [employer] (which disclaims liability or warranty for this information). If you have questions about a medical condition or this instruction, always ask your healthcare professional. [place] [address] any warranty or liability for your use of this information.

## 2018-02-20 NOTE — PROGRESS NOTES
Billy Levi. is a 75 y.o. male who presents for routine immunizations. He denies any symptoms , reactions or allergies that would exclude them from being immunized today. Risks and adverse reactions were discussed and the VIS was given to them. All questions were addressed. He was observed for 15min post injection. There were no reactions observed.     Hugh Bello LPN

## 2018-02-20 NOTE — PROGRESS NOTES
Chief Complaint   Patient presents with   [facility] Annual Wellness Visit     1. Have you been to the ER, urgent care clinic since your last visit? Hospitalized since your last visit? No    2. Have you seen or consulted any other health care providers outside of the [address] [place] [address] since your last visit? Include any pap smears or colon screening. No      This is a Subsequent Medicare Annual Wellness Exam (AWV) (Performed 12 months after IPPE or effective date of Medicare Part B enrollment)    I have reviewed the patient's medical history in detail and updated the computerized patient record.      History   [facility]     Past Medical History:   Diagnosis Date    BPH (benign prostatic hyperplasia)     Depression     Hypertension     Irritability and anger       Past Surgical History:   Procedure Laterality Date    COLONOSCOPY N/A 7/19/2017    COLONOSCOPY / polypectomy performed by Vicky Hernandez MD at [facility] OTHER SURGICAL  1993    plate placed in head due to injury    UT COLSC FLX W/REMOVAL LESION BY HOT BX FORCEPS N/A 07/19/2017    Dr. Derrick Goss     Current Outpatient Prescriptions   Medication Sig Dispense Refill    omeprazole (PRILOSEC) 20 mg capsule TAKE ONE CAPSULE BY MOUTH DAILY 90 Cap 0    losartan (COZAAR) 100 mg tablet TAKE 1 TABLET BY MOUTH DAILY FOR HIGH BLOOD PRESSURE 90 Tab 0    atorvastatin (LIPITOR) 40 mg tablet TAKE 1 TABLET BY MOUTH DAILY 90 Tab 0    codeine-butalbital-acetaminophen-caffeine (FIORICET WITH CODEINE) -70-30 mg capsule TAKE 1 CAPSULE BY MOUTH EVERY 6 HOURS AS NEEDED FOR HEADACHE OR MIGRAINE 30 Cap 0    hydroCHLOROthiazide (HYDRODIURIL) 25 mg tablet TAKE 1 TABLET BY MOUTH DAILY 60 Tab 0    amLODIPine (NORVASC) 10 mg tablet TAKE 1 TABLET BY MOUTH DAILY FOR BLOOD PRESSURE 90 Tab 0    metoprolol succinate (TOPROL-XL) 50 mg XL tablet TAKE 1 TABLET BY MOUTH DAILY 90 Tab 3    diclofenac EC (VOLTAREN) 50 mg EC tablet TAKE 1 TABLET BY MOUTH TWICE DAILY AS NEEDED 180 Tab 0    sertraline (ZOLOFT) 50 mg tablet Take 2 Tabs by mouth daily. 180 Tab 3    atenolol (TENORMIN) 50 mg tablet Take  by mouth daily.  SUMAtriptan (IMITREX) 25 mg tablet Take 25 mg by mouth once as needed for Migraine. No Known Allergies  Family History   Problem Relation Age of Onset    Diabetes Mother     Heart Disease Father     Diabetes Father      Social History   Substance Use Topics    Smoking status: Never Smoker    Smokeless tobacco: Never Used    Alcohol use No     Patient Active Problem List   Diagnosis Code    Mood disorder ([facility] [address] F39    ACP (advance care planning) Z71.89    Essential hypertension I10    Epistaxis R04.0    Headache R51    Lower urinary tract symptoms (LUTS) R39.9    Dyslipidemia E78.5    Chronic pain of right knee M25.561, G89.29       Depression Risk Factor Screening:     PHQ over the last two weeks 2/20/2018   PHQ Not Done -   Little interest or pleasure in doing things Not at all   Feeling down, depressed or hopeless Not at all   Total Score PHQ 2 0     Alcohol Risk Factor Screening: You do not drink alcohol or very rarely. Functional Ability and Level of Safety:   1. Was the patient's timed Up and GO test unsteady or longer than 30 seconds? No  2. Does the patient need help with the phone, transportation, shopping, preparing meals, housework, laundry, medications or managing money? No  3. Does the patients' home have rugs in the hallway, lack grab bars in the bathroom, lack handrails on the stairs or have poor lighting? No  4. Have you noticed any hearing difficulties? Patient states difficulty hearing when on the phone at times. Hearing Evaluation:  Hearing Loss  Hearing is good. The patient needs further evaluation. Activities of Daily Living  The home contains: no safety equipment. Patient does total self care    Fall Risk  Fall Risk Assessment, last 12 mths 2/20/2018   Able to walk?  Yes Fall in past 12 months? No   Fall with injury? -   Number of falls in past 12 months -   Fall Risk Score -       Abuse Screen  Patient is not abused    Cognitive Screening   Evaluation of Cognitive Function:  Has your family/caregiver stated any concerns about your memory: no  Normal    Patient Care Team   Patient Care Team:  Wolfgang Goldman MD as PCP - General (Family Practice)  Diana Luis MD as Physician (Colon and Rectal Surgery)  Sancho Garcia MD (Orthopedic Surgery)    Assessment/Plan   Education and counseling provided:  Are appropriate based on today's review and evaluation  End-of-Life planning (with patient's consent)  Pneumococcal Vaccine  Prostate cancer screening tests (PSA, covered annually)  Cardiovascular screening blood test  Screening for glaucoma  Diabetes screening test    1. Encounter for Medicare annual wellness exam    2. ACP (advance care planning)    3. Screening for glaucoma  - REFERRAL TO OPHTHALMOLOGY    4. Screening for prostate cancer  - PSA - SCREENING (); Future    5. Encounter for immunization  - PNEUMOCOCCAL POLYSACCHARIDE VACCINE, 23-VALENT, ADULT OR IMMUNOSUPPRESSED PT DOSE,  - ADMIN PNEUMOCOCCAL VACCINE  (MEDICARE ONLY)      Health Maintenance Due   Topic Date Due    GLAUCOMA SCREENING Q2Y  12/12/2014    Pneumococcal 65+ Low/Medium Risk (2 of 2 - PPSV23) 01/19/2018   I have discussed the diagnosis with the patient and the intended plan of care as seen in the above orders. The patient has received an after-visit summary and questions were answered concerning future plans. I have discussed medication, side effects, and warnings with the patient in detail. The patient verbalized understanding and is in agreement with the plan of care. The patient will contact the office with any additional concerns. Personalized preventative plan of care was discussed, printed and given to patient.     Wolfgang Goldman MD

## 2018-02-20 NOTE — MR AVS SNAPSHOT
[facility] [address] [facility] 
[phone] Patient: Reggie Soto. MRN: [id number] :1950 Visit Information Date & Time Provider Department Dept. Phone Encounter #  
 2018 10:00 AM Cami Freeman MD [facility] [phone] [id number] Follow-up Instructions Return in about 6 days (around 2018), or if symptoms worsen or fail to improve, for htn. Upcoming Health Maintenance Date Due  
 GLAUCOMA SCREENING Q2Y 2014 Pneumococcal 65+ Low/Medium Risk (2 of 2 - PPSV23) 2018 ZOSTER VACCINE AGE 60> 10/20/2018* MEDICARE YEARLY EXAM 2018 COLONOSCOPY 2022 DTaP/Tdap/Td series (2 - Td) 2027 *Topic was postponed. The date shown is not the original due date. Allergies as of 2018  Review Complete On: 2018 By: Luciano Closs, MD  
 No Known Allergies Current Immunizations  Reviewed on 2017 Name Date Influenza High Dose Vaccine PF 10/20/2017 Tdap 2017 Not reviewed this visit You Were Diagnosed With   
  
 Codes Comments Essential hypertension    -  Primary ICD-10-CM: I10 
ICD-9-CM: 401.9 Mood disorder ([facility] [address]     ICD-10-CM: F39 
ICD-9-CM: 296.90 Change in bowel habits     ICD-10-CM: R19.4 ICD-9-CM: 787.99 Encounter for Medicare annual wellness exam     ICD-10-CM: Z00.00 ICD-9-CM: V70.0 ACP (advance care planning)     ICD-10-CM: Z71.89 ICD-9-CM: V65.49 Nonintractable headache, unspecified chronicity pattern, unspecified headache type     ICD-10-CM: R51 ICD-9-CM: 784.0 Impacted cerumen of right ear     ICD-10-CM: H61.21 ICD-9-CM: 380.4 Screening for glaucoma     ICD-10-CM: Z13.5 ICD-9-CM: V80.1 Vitals BP Pulse Temp Resp Height(growth percentile) Weight(growth percentile)  (!) 190/109 (BP 1 Location: Left arm, BP Patient Position: Sitting) 81 97 °F (36.1 °C) (Oral) 18 5' 5\" (1.651 m) 189 lb (85.7 kg) SpO2 BMI Smoking Status 94% 31.45 kg/m2 Never Smoker Vitals History BMI and BSA Data Body Mass Index Body Surface Area  
 31.45 kg/m 2 1.98 m 2 Preferred Pharmacy Pharmacy Name Phone [address] [facility], [address] [facility] [phone] Your Updated Medication List  
  
   
This list is accurate as of: 2/20/18 11:01 AM.  Always use your most recent med list. amLODIPine 10 mg tablet Commonly known as:  Tylene Alex TAKE 1 TABLET BY MOUTH DAILY FOR BLOOD PRESSURE  
  
 atorvastatin 40 mg tablet Commonly known as:  LIPITOR  
TAKE 1 TABLET BY MOUTH DAILY  
  
 carbamide peroxide 6.5 % otic solution Commonly known as:  Adama Prescottadanabeel Administer 5 Drops into each ear two (2) times a day. codeine-butalbital-acetaminophen-caffeine -68-30 mg capsule Commonly known as:  FIORICET WITH CODEINE  
TAKE 1 CAPSULE BY MOUTH EVERY 6 HOURS AS NEEDED FOR HEADACHE OR MIGRAINE  
  
 hydrALAZINE 25 mg tablet Commonly known as:  APRESOLINE Take 1 Tab by mouth two (2) times a day. Indications: hypertension  
  
 hydroCHLOROthiazide 25 mg tablet Commonly known as:  HYDRODIURIL  
TAKE 1 TABLET BY MOUTH DAILY losartan 100 mg tablet Commonly known as:  COZAAR  
TAKE 1 TABLET BY MOUTH DAILY FOR HIGH BLOOD PRESSURE  
  
 metoprolol succinate 50 mg XL tablet Commonly known as:  TOPROL-XL  
TAKE 1 TABLET BY MOUTH DAILY  
  
 omeprazole 20 mg capsule Commonly known as:  PRILOSEC  
TAKE ONE CAPSULE BY MOUTH DAILY  
  
 sertraline 50 mg tablet Commonly known as:  ZOLOFT Take 2 Tabs by mouth daily. Prescriptions Sent to Pharmacy Refills  
 hydrALAZINE (APRESOLINE) 25 mg tablet 2 Sig: Take 1 Tab by mouth two (2) times a day. Indications: hypertension  Class: Normal  
 Pharmacy: [facility] [address] [facility] #: [phone] Route: Oral  
 carbamide peroxide (DEBROX) 6.5 % otic solution 0 Sig: Administer 5 Drops into each ear two (2) times a day. Class: Normal  
 [facility] [facility] #: [phone] Route: Both Ears We Performed the Following REFERRAL TO GASTROENTEROLOGY [FFK40 Custom] REFERRAL TO OPHTHALMOLOGY [REF57 Custom] Follow-up Instructions Return in about 6 days (around 2/26/2018), or if symptoms worsen or fail to improve, for htn. To-Do List   
 02/20/2018 Lab:  CBC WITH AUTOMATED DIFF   
  
 02/20/2018 Lab:  METABOLIC PANEL, BASIC Referral Information Referral ID Referred By Referred To  
  
 [id number] Reyna Leventhal N Not Available Visits Status Start Date End Date 1 New Request 2/20/18 2/20/19 If your referral has a status of pending review or denied, additional information will be sent to support the outcome of this decision. Referral ID Referred By Referred To  
 [id number] Reyna Leventhal N Not Available Visits Status Start Date End Date 1 New Request 2/20/18 2/20/19 If your referral has a status of pending review or denied, additional information will be sent to support the outcome of this decision. Patient Instructions Medicare Wellness Visit, Male The best way to live healthy is to have a healthy lifestyle by eating a well-balanced diet, exercising regularly, limiting alcohol and stopping smoking. Regular physical exams and screening tests are another way to keep healthy. Preventive exams provided by your health care provider can find health problems before they become diseases or illnesses. Preventive services including immunizations, screening tests, monitoring and exams can help you take care of your own health. All people over age 72 should have a pneumovax  and and a prevnar shot to prevent pneumonia. These are once in a lifetime unless you and your provider decide differently. All people over 65 should have a yearly flu shot and a tetanus vaccine every 10 years. Screening for diabetes mellitus with a blood sugar test should be done every year. Glaucoma is a disease of the eye due to increased ocular pressure that can lead to blindness and it should be done every year by an eye professional. 
 
Cardiovascular screening tests that check for elevated lipids (fatty part of blood) which can lead to heart disease and strokes should be done every 5 years. Colorectal screening that evaluates for blood or polyps in your colon should be done yearly as a stool test or every five years as a flexible sigmoidoscope or every 10 years as a colonoscopy up to age 76. Men up to age 76 may need a screening blood test for prostate cancer at certain intervals, depending on their personal and family history. This decision is between the patient and his provider. If you have been a smoker or had family history of abdominal aortic aneurysms, you and your provider may decide to schedule an ultrasound test of your aorta. Hepatitis C screening is also recommended for anyone born between 80 through [address]. A shingles vaccine is also recommended once in a lifetime after age 61. Your Medicare Wellness Exam is recommended annually. Here is a list of your current Health Maintenance items with a due date: 
Health Maintenance Due Topic Date Due  Glaucoma Screening   12/12/2014  Pneumococcal Vaccine (2 of 2 - PPSV23) 01/19/2018 Medicare Part B Preventive Services Limitations Recommendation Scheduled Bone Mass Measurement 
(age 72 & older, biennial) Requires diagnosis related to osteoporosis or estrogen deficiency.  Biennial benefit unless patient has history of long-term glucocorticoid tx or baseline is needed because initial test was by other method N/A Cardiovascular Screening Blood Tests (every 5 years) Total cholesterol, HDL, Triglycerides Order as a panel if possible Due Colorectal Cancer Screening 
-Fecal occult blood test (annual) -Flexible sigmoidoscopy (5y) 
-Screening colonoscopy (10y) -Barium Enema  UTD Counseling to Prevent Tobacco Use (up to 8 sessions per year) - Counseling greater than 3 and up to 10 minutes - Counseling greater than 10 minutes Patients must be asymptomatic of tobacco-related conditions to receive as preventive service UTD Diabetes Screening Tests (at least every 3 years, Medicare covers annually or at 6-month intervals for prediabetic patients) Fasting blood sugar (FBS) or glucose tolerance test (GTT) Patient must be diagnosed with one of the following: 
-Hypertension, Dyslipidemia, obesity, previous impaired FBS or GTT 
Or any two of the following: overweight, FH of diabetes, age ? 72, history of gestational diabetes, birth of baby weighing more than 9 pounds Due Diabetes Self-Management Training (DSMT) (no USPSTF recommendation) Requires referral by treating physician for patient with diabetes or renal disease. 10 hours of initial DSMT session of no less than 30 minutes each in a continuous 12-month period. 2 hours of follow-up DSMT in subsequent years. N/A Glaucoma Screening (no USPSTF recommendation) Diabetes mellitus, family history, , age 48 or over,  American, age 72 or over Due Human Immunodeficiency Virus (HIV) Screening (annually for increased risk patients) HIV-1 and HIV-2 by EIA, MERYL, rapid antibody test, or oral mucosa transudate Patient must be at increased risk for HIV infection per USPSTF guidelines or pregnant. Tests covered annually for patients at increased risk. Pregnant patients may receive up to 3 test during pregnancy.  N/A   
 Medical Nutrition Therapy (MNT) (for diabetes or renal disease not recommended schedule) Requires referral by treating physician for patient with diabetes or renal disease. Can be provided in same year as diabetes self-management training (DSMT), and CMS recommends medical nutrition therapy take place after DSMT. Up to 3 hours for initial year and 2 hours in subsequent years. N/A Prostate Cancer Screening (annually up to age 76) - Digital rectal exam (SHAKIRA) - Prostate specific antigen (PSA) Annually (age 48 or over), SHAKIRA not paid separately when covered E/M service is provided on same date Men up to age 76 may need a screening blood test for prostate cancer at certain intervals, depending on their personal and family history. This decision is between the patient and his provider. Due Seasonal Influenza Vaccination (annually)  UTD Pneumococcal Vaccination (once after 65) Due   
Hepatitis B Vaccinations (if medium/high risk) Medium/high risk factors:  End-stage renal disease, Hemophiliacs who received Factor VIII or IX concentrates, Clients of institutions for the mentally retarded, Persons who live in the same house as a HepB virus carrier, Homosexual men, Illicit injectable drug abusers. N/A Shingles Vaccination A shingles vaccine is also recommended once in a lifetime after age 61 Due Ultrasound Screening for Abdominal Aortic Aneurysm (AAA) (once) Patient must be referred through IPPE and not have had a screening for abdominal aortic aneurysm before under Medicare. Limited to patients who meet one of the following criteria: 
- Men who are 73-68 years old and have smoked more than 100 cigarettes in their lifetime. 
-Anyone with a FH of AAA 
-Anyone recommended for screening by USPSTF N/A Earwax Blockage: Care Instructions Your Care Instructions Earwax is a natural substance that protects the ear canal. Normally, earwax drains from the ears and does not cause problems. Sometimes earwax builds up and hardens. Earwax blockage (also called cerumen impaction) can cause some loss of hearing and pain. When wax is tightly packed, you will need to have your doctor remove it. Follow-up care is a key part of your treatment and safety. Be sure to make and go to all appointments, and call your doctor if you are having problems. It's also a good idea to know your test results and keep a list of the medicines you take. How can you care for yourself at home? · Do not try to remove earwax with cotton swabs, fingers, or other objects. This can make the blockage worse and damage the eardrum. · If your doctor recommends that you try to remove earwax at home: ¨ Soften and loosen the earwax with warm mineral oil. You also can try hydrogen peroxide mixed with an equal amount of room temperature water. Place 2 drops of the fluid, warmed to body temperature, in the ear two times a day for up to 5 days. ¨ Once the wax is loose and soft, all that is usually needed to remove it from the ear canal is a gentle, warm shower. Direct the water into the ear, then tip your head to let the earwax drain out. Dry your ear thoroughly with a hair dryer set on low. Hold the dryer several inches from your ear. ¨ If the warm mineral oil and shower do not work, use an over-the-counter wax softener followed by gentle flushing with an ear syringe each night for a week or two. Make sure the flushing solution is body temperature. Cool or hot fluids in the ear can cause dizziness. When should you call for help? Call your doctor now or seek immediate medical care if: 
? · Pus or blood drains from your ear. ? · Your ears are ringing or feel full. ? · You have a loss of hearing. ? Watch closely for changes in your health, and be sure to contact your doctor if: 
? · You have pain or reduced hearing after 1 week of home treatment. ? · You have any new symptoms, such as nausea or balance problems. Where can you learn more? Go to http://lavonne-frances.info/. Enter F656 in the search box to learn more about \"Earwax Blockage: Care Instructions. \" Current as of: March 20, 2017 Content Version: 11.4 © 3966-9441 [employer]. Care instructions adapted under license by [employer] (which disclaims liability or warranty for this information). If you have questions about a medical condition or this instruction, always ask your healthcare professional. [address] any warranty or liability for your use of this information. Acute High Blood Pressure: Care Instructions Your Care Instructions Acute high blood pressure is very high blood pressure. It's a serious problem. Very high blood pressure can damage your brain, heart, eyes, and kidneys. You may have been given medicines to lower your blood pressure. You may have gotten them as pills or through a needle in one of your veins. This is called an IV. And maybe you were given other medicines too. These can be needed when high blood pressure causes other problems. To keep your blood pressure at a lower level, you may need to make healthy lifestyle changes. And you will probably need to take medicines. Be sure to follow up with your doctor about your blood pressure and what you can do about it. Follow-up care is a key part of your treatment and safety. Be sure to make and go to all appointments, and call your doctor if you are having problems. It's also a good idea to know your test results and keep a list of the medicines you take. How can you care for yourself at home? · See your doctor as often as he or she recommends. This is to make sure your blood pressure is under control. You will probably go at least 2 times a year.  But it may be more often at first. 
 · Take your blood pressure medicine exactly as prescribed. You may take one or more types. They include diuretics, beta-blockers, ACE inhibitors, calcium channel blockers, and angiotensin II receptor blockers. Call your doctor if you think you are having a problem with your medicine. · If you take blood pressure medicine, talk to your doctor before you take decongestants or anti-inflammatory medicine, such as ibuprofen. These can raise blood pressure. · Learn how to check your blood pressure at home. Check it often. · Ask your doctor if it's okay to drink alcohol. · Talk to your doctor about lifestyle changes that can help blood pressure. These include being active and not smoking. When should you call for help? Call 911 anytime you think you may need emergency care. This may mean having symptoms that suggest that your blood pressure is causing a serious heart or blood vessel problem. Your blood pressure may be over 180/110. ? For example, call 911 if: 
? · You have symptoms of a heart attack. These may include: ¨ Chest pain or pressure, or a strange feeling in the chest. 
¨ Sweating. ¨ Shortness of breath. ¨ Nausea or vomiting. ¨ Pain, pressure, or a strange feeling in the back, neck, jaw, or upper belly or in one or both shoulders or arms. ¨ Lightheadedness or sudden weakness. ¨ A fast or irregular heartbeat. ? · You have symptoms of a stroke. These may include: 
¨ Sudden numbness, tingling, weakness, or loss of movement in your face, arm, or leg, especially on only one side of your body. ¨ Sudden vision changes. ¨ Sudden trouble speaking. ¨ Sudden confusion or trouble understanding simple statements. ¨ Sudden problems with walking or balance. ¨ A sudden, severe headache that is different from past headaches. ? · You have severe back or belly pain. ?Do not wait until your blood pressure comes down on its own. Get help right away. ?Call your doctor now or seek immediate care if: ? · Your blood pressure is much higher than normal (such as 180/110 or higher), but you don't have symptoms. ? · You think high blood pressure is causing symptoms, such as: ¨ Severe headache. ¨ Blurry vision. ? Watch closely for changes in your health, and be sure to contact your doctor if: 
? · Your blood pressure measures 140/90 or higher at least 2 times. That means the top number is 140 or higher or the bottom number is 90 or higher, or both. ? · You think you may be having side effects from your blood pressure medicine. ? · Your blood pressure is usually normal, but it goes above normal at least 2 times. Where can you learn more? Go to http://lavonne-frances.info/. Enter I287 in the search box to learn more about \"Acute High Blood Pressure: Care Instructions. \" Current as of: September 21, 2016 Content Version: 11.4 © 1531-8984 [employer]. Care instructions adapted under license by [employer] (which disclaims liability or warranty for this information). If you have questions about a medical condition or this instruction, always ask your healthcare professional. [place] [address] any warranty or liability for your use of this information. Introducing [facility]! [facility] introduces eGifter patient portal. Now you can access parts of your medical record, email your doctor's office, and request medication refills online. 1. In your internet browser, go to [email or web address] 2. Click on the First Time User? Click Here link in the Sign In box. You will see the New Member Sign Up page. 3. Enter your eGifter Access Code exactly as it appears below. You will not need to use this code after youve completed the sign-up process. If you do not sign up before the expiration date, you must request a new code. · eGifter Access Code: [email or web address] Expires: 5/21/2018 11:01 AM 
 
 4. Enter the last four digits of your Social Security Number (xxxx) and Date of Birth (mm/dd/yyyy) as indicated and click Submit. You will be taken to the next sign-up page. 5. Create a DoCircuits ID. This will be your DoCircuits login ID and cannot be changed, so think of one that is secure and easy to remember. 6. Create a DoCircuits password. You can change your password at any time. 7. Enter your Password Reset Question and Answer. This can be used at a later time if you forget your password. 8. Enter your e-mail address. You will receive e-mail notification when new information is available in [address] 9. Click Sign Up. You can now view and download portions of your medical record. 10. Click the Download Summary menu link to download a portable copy of your medical information. If you have questions, please visit the Frequently Asked Questions section of the DoCircuits website. Remember, DoCircuits is NOT to be used for urgent needs. For medical emergencies, dial 911. Now available from your iPhone and Android! Please provide this summary of care documentation to your next provider. Your primary care clinician is listed as Cami Mcgee. If you have any questions after today's visit, please call [phone].

## 2018-02-21 NOTE — ACP (ADVANCE CARE PLANNING)
Advance Care Planning (ACP) Provider Note - Comprehensive     Date of ACP Conversation: 02/20/18  Persons included in Conversation:  patient  Length of ACP Conversation in minutes:  20 minutes    Authorized Decision Maker (if patient is incapable of making informed decisions): This person is:  Patient's wife would be his authorized decision maker. He will discuss with the wife and fill out forms given and bring this back in for scanning into the EMR chart. Currently he would like everything done possible if there is a chance that he would be able to survive and live a good life. General ACP for ALL Patients with Decision Making Capacity:   Importance of advance care planning, including choosing a healthcare agent to communicate patient's healthcare decisions if patient lost the ability to make decisions, such as after a sudden illness or accident  Understanding of the healthcare agent role was assessed and information provided  Exploration of values, goals, and preferences if recovery is not expected, even with continued medical treatment in the event of: Imminent death  Severe, permanent brain injury  \"In these circumstances, what matters most to you? \"  Care focused more on comfort or quality of life.   Opportunity offered to explore how cultural, Advent, spiritual, or personal beliefs would affect decisions for future care     Interventions Provided:  Recommended completion of Advance Directive form after review of ACP materials and conversation with prospective healthcare agent      Cami Harrison MD

## 2018-02-26 ENCOUNTER — OFFICE VISIT (OUTPATIENT)
Dept: FAMILY MEDICINE CLINIC | Age: 68
End: 2018-02-26

## 2018-02-26 ENCOUNTER — LAB ONLY (OUTPATIENT)
Dept: FAMILY MEDICINE CLINIC | Age: 68
End: 2018-02-26

## 2018-02-26 VITALS
HEIGHT: 65 IN | SYSTOLIC BLOOD PRESSURE: 143 MMHG | BODY MASS INDEX: 31.49 KG/M2 | DIASTOLIC BLOOD PRESSURE: 89 MMHG | HEART RATE: 69 BPM | WEIGHT: 189 LBS | RESPIRATION RATE: 18 BRPM | OXYGEN SATURATION: 96 % | TEMPERATURE: 97.5 F

## 2018-02-26 DIAGNOSIS — F39 MOOD DISORDER (HCC): ICD-10-CM

## 2018-02-26 DIAGNOSIS — I10 ESSENTIAL HYPERTENSION: Primary | ICD-10-CM

## 2018-02-26 RX ORDER — DICLOFENAC POTASSIUM 50 MG/1
50 TABLET, FILM COATED ORAL 3 TIMES DAILY
COMMUNITY
End: 2018-08-07 | Stop reason: ALTCHOICE

## 2018-02-26 NOTE — PROGRESS NOTES
Chief Complaint   Patient presents with    Hypertension     follow-up     1. Have you been to the ER, urgent care clinic since your last visit? Hospitalized since your last visit? No    2. Have you seen or consulted any other health care providers outside of the [address] [place] [address] since your last visit? Include any pap smears or colon screening. No     HPI  [employer]. comes in for follow-up care. Hypertension: Patient was seen last week with elevated blood pressure. I did adjust his medications. I added hydralazine 25 mg twice daily. He comes in today for follow-up care and blood pressure check. He denies headache or any changes in vision. His blood pressure is down at 143/89. Still elevated systolic reading. I will have him take his medication and the recheck this at next visit. If still elevated may need to increase the hydralazine to 50 mg twice a day. Patient is on losartan 100 mg daily, metoprolol succinate 50 mg daily, amlodipine 10 mg daily and HCTZ 25 mg daily. Given his pulse was in the 60s we did not increase his metoprolol. Mood disorder: Patient feels relaxed on Zoloft 50 mg daily. He has a history of mood swings with anger issues but the Zoloft does help him. We will continue with the current treatment. Past Medical History  Past Medical History:   Diagnosis Date    BPH (benign prostatic hyperplasia)     Depression     Hypertension     Irritability and anger        Surgical History  Past Surgical History:   Procedure Laterality Date    COLONOSCOPY N/A 7/19/2017    COLONOSCOPY / polypectomy performed by Francisco Capone MD at [facility] ENDOSCOPY    HX OTHER SURGICAL  1993    plate placed in head due to injury    MT COLSC FLX W/REMOVAL LESION BY HOT BX FORCEPS N/A 07/19/2017    Dr. Brennan Dubose        Medications  Current Outpatient Prescriptions   Medication Sig Dispense Refill    diclofenac potassium (CATAFLAM) 50 mg tablet Take 50 mg by mouth three (3) times daily.  carbamide peroxide (DEBROX) 6.5 % otic solution Administer 5 Drops into each ear two (2) times a day. 7.5 mL 0    hydrALAZINE (APRESOLINE) 25 mg tablet TAKE 1 TABLET BY MOUTH TWICE DAILY FOR HIGH BLOOD PRESSURE 180 Tab 2    omeprazole (PRILOSEC) 20 mg capsule TAKE ONE CAPSULE BY MOUTH DAILY 90 Cap 0    losartan (COZAAR) 100 mg tablet TAKE 1 TABLET BY MOUTH DAILY FOR HIGH BLOOD PRESSURE 90 Tab 0    atorvastatin (LIPITOR) 40 mg tablet TAKE 1 TABLET BY MOUTH DAILY 90 Tab 0    codeine-butalbital-acetaminophen-caffeine (FIORICET WITH CODEINE) -77-30 mg capsule TAKE 1 CAPSULE BY MOUTH EVERY 6 HOURS AS NEEDED FOR HEADACHE OR MIGRAINE 30 Cap 0    hydroCHLOROthiazide (HYDRODIURIL) 25 mg tablet TAKE 1 TABLET BY MOUTH DAILY 60 Tab 0    amLODIPine (NORVASC) 10 mg tablet TAKE 1 TABLET BY MOUTH DAILY FOR BLOOD PRESSURE 90 Tab 0    metoprolol succinate (TOPROL-XL) 50 mg XL tablet TAKE 1 TABLET BY MOUTH DAILY 90 Tab 3    sertraline (ZOLOFT) 50 mg tablet Take 2 Tabs by mouth daily. 180 Tab 3       Allergies  No Known Allergies    Family History  Family History   Problem Relation Age of Onset    Diabetes Mother     Heart Disease Father     Diabetes Father        Social History  Social History     Social History    Marital status: SINGLE     Spouse name: N/A    Number of children: N/A    Years of education: N/A     Occupational History    Not on file.      Social History Main Topics    Smoking status: Never Smoker    Smokeless tobacco: Never Used    Alcohol use No    Drug use: No    Sexual activity: Yes     Partners: Female     Other Topics Concern    Not on file     Social History Narrative       Review of Systems  Review of Systems - History obtained from chart review and the patient  General ROS: negative for - chills, fatigue, fever or malaise  Psychological ROS: positive for - mood swings  Ophthalmic ROS: negative  Respiratory ROS: no cough, shortness of breath, or wheezing  Cardiovascular ROS: no chest pain or dyspnea on exertion  Gastrointestinal ROS: no abdominal pain, change in bowel habits, or black or bloody stools  Musculoskeletal ROS: negative  Neurological ROS: negative    Vital Signs  Visit Vitals    /89 (BP 1 Location: Left arm, BP Patient Position: Sitting)    Pulse 69    Temp 97.5 °F (36.4 °C) (Oral)    Resp 18    Ht 5' 5\" (1.651 m)    Wt 189 lb (85.7 kg)    SpO2 96%    BMI 31.45 kg/m2         Physical Exam  Physical Examination: General appearance - alert, well appearing, and in no distress, oriented to person, place, and time and acyanotic, in no respiratory distress  Mental status - alert, oriented to person, place, and time, affect appropriate to mood  Eyes - sclera anicteric  Chest - clear to auscultation, no wheezes, rales or rhonchi, symmetric air entry  Heart - normal rate and regular rhythm, S1 and S2 normal  Neurological - alert, oriented, normal speech, no focal findings or movement disorder noted  Musculoskeletal - osteoarthritic changes noted in both hands    Results  Results for orders placed or performed in visit on 09/27/17   CULTURE, STOOL   Result Value Ref Range    Salmonella/Shigella Screen CANCELED     Campylobacter Culture CANCELED     E coli Shiga toxin CANCELED    CULTURE, STOOL   Result Value Ref Range    Salmonella/Shigella Screen CANCELED     Campylobacter Culture CANCELED     E coli Shiga toxin CANCELED    CBC WITH AUTOMATED DIFF   Result Value Ref Range    WBC 3.1 (L) 3.4 - 10.8 x10E3/uL    RBC 4.71 4.14 - 5.80 x10E6/uL    HGB 14.0 12.6 - 17.7 g/dL    HCT 42.1 37.5 - 51.0 %    MCV 89 79 - 97 fL    MCH 29.7 26.6 - 33.0 pg    MCHC 33.3 31.5 - 35.7 g/dL    RDW 14.8 12.3 - 15.4 %    PLATELET 359 683 - 433 x10E3/uL    NEUTROPHILS 41 %    Lymphocytes 45 %    MONOCYTES 9 %    EOSINOPHILS 5 %    BASOPHILS 0 %    ABS. NEUTROPHILS 1.3 (L) 1.4 - 7.0 x10E3/uL    Abs Lymphocytes 1.4 0.7 - 3.1 x10E3/uL    ABS. MONOCYTES 0.3 0.1 - 0.9 x10E3/uL    ABS.  EOSINOPHILS 0.1 0.0 - 0.4 x10E3/uL    ABS. BASOPHILS 0.0 0.0 - 0.2 x10E3/uL    IMMATURE GRANULOCYTES 0 %    ABS. IMM. GRANS. 0.0 0.0 - 0.1 [id number]/[id number]   METABOLIC PANEL, COMPREHENSIVE   Result Value Ref Range    Glucose 95 65 - 99 mg/dL    BUN 10 8 - 27 mg/dL    Creatinine 0.91 0.76 - 1.27 mg/dL    GFR est non-AA 87 >59 mL/min/1.73    GFR est  >59 mL/min/1.73    BUN/Creatinine ratio 11 10 - 24    Sodium 142 134 - 144 mmol/L    Potassium 3.8 3.5 - 5.2 mmol/L    Chloride 98 96 - 106 mmol/L    CO2 25 18 - 29 mmol/L    Calcium 9.4 8.6 - 10.2 mg/dL    Protein, total 7.8 6.0 - 8.5 g/dL    Albumin 4.5 3.6 - 4.8 g/dL    GLOBULIN, TOTAL 3.3 1.5 - 4.5 g/dL    A-G Ratio 1.4 1.2 - 2.2    Bilirubin, total 0.4 0.0 - 1.2 mg/dL    Alk. phosphatase 106 39 - 117 IU/L    AST (SGOT) 26 0 - 40 IU/L    ALT (SGPT) 23 0 - 44 IU/L   LIPID PANEL   Result Value Ref Range    Cholesterol, total 123 100 - 199 mg/dL    Triglyceride 212 (H) 0 - 149 mg/dL    HDL Cholesterol 42 >39 mg/dL    VLDL, calculated 42 (H) 5 - 40 mg/dL    LDL, calculated 39 0 - 99 mg/dL   CVD REPORT   Result Value Ref Range    INTERPRETATION Note    PLEASE NOTE   Result Value Ref Range    Please note Comment    PLEASE NOTE   Result Value Ref Range    Please note Comment        ASSESSMENT and PLAN    ICD-10-CM ICD-9-CM    1. Essential hypertension I10 401.9    2. Mood disorder ([facility] F39 296.90      reviewed diet, exercise and weight control  reviewed medications and side effects in detail    I have discussed the diagnosis with the patient and the intended plan of care as seen in the above orders. The patient has received an after-visit summary and questions were answered concerning future plans. I have discussed medication, side effects, and warnings with the patient in detail. The patient verbalized understanding and is in agreement with the plan of care. The patient will contact the office with any additional concerns.     Radha Golden MD

## 2018-02-26 NOTE — MR AVS SNAPSHOT
[facility] [address] [phone] Patient: Odalys Perez. MRN: [id number] :1950 Visit Information Date & Time Provider Department Dept. Phone Encounter #  
 2018  4:45 PM MD Radha Schreiber [id number] Follow-up Instructions Return in about 1 month (around 3/26/2018), or if symptoms worsen or fail to improve, for htn. Your Appointments 2018  4:45 PM  
Follow Up with MD Radha Schreiber ([address]) Appt Note: for htn. [address] [address] [place] [address]  
  
   
 [address] Upcoming Health Maintenance Date Due  
 GLAUCOMA SCREENING Q2Y 2014 ZOSTER VACCINE AGE 60> 10/20/2018* MEDICARE YEARLY EXAM 2019 COLONOSCOPY 2022 DTaP/Tdap/Td series (2 - Td) 2027 *Topic was postponed. The date shown is not the original due date. Allergies as of 2018  Review Complete On: 2018 By: Alethea Vences No Known Allergies Current Immunizations  Reviewed on 2017 Name Date Influenza High Dose Vaccine PF 10/20/2017 Pneumococcal Polysaccharide (PPSV-23) 2018 Tdap 2017 Not reviewed this visit You Were Diagnosed With   
  
 Codes Comments Essential hypertension    -  Primary ICD-10-CM: I10 
ICD-9-CM: 401.9 Vitals BP Pulse Temp Resp Height(growth percentile) Weight(growth percentile) (!) 158/93 (BP 1 Location: Left arm, BP Patient Position: Sitting) 69 97.5 °F (36.4 °C) (Oral) 18 5' 5\" (1.651 m) 189 lb (85.7 kg) SpO2 BMI Smoking Status 96% 31.45 kg/m2 Never Smoker BMI and BSA Data Body Mass Index Body Surface Area  
 31.45 kg/m 2 1.98 m 2 Preferred Pharmacy Pharmacy Name Phone [address] [phone] Your Updated Medication List  
  
   
This list is accurate as of 2/26/18  9:56 AM.  Always use your most recent med list. amLODIPine 10 mg tablet Commonly known as:  Chani Paul TAKE 1 TABLET BY MOUTH DAILY FOR BLOOD PRESSURE  
  
 atorvastatin 40 mg tablet Commonly known as:  LIPITOR  
TAKE 1 TABLET BY MOUTH DAILY  
  
 carbamide peroxide 6.5 % otic solution Commonly known as:  Jeovanyendlyeliana Cools Administer 5 Drops into each ear two (2) times a day. codeine-butalbital-acetaminophen-caffeine -86-30 mg capsule Commonly known as:  FIORICET WITH CODEINE  
TAKE 1 CAPSULE BY MOUTH EVERY 6 HOURS AS NEEDED FOR HEADACHE OR MIGRAINE  
  
 diclofenac potassium 50 mg tablet Commonly known as:  CATAFLAM  
Take 50 mg by mouth three (3) times daily. hydrALAZINE 25 mg tablet Commonly known as:  APRESOLINE  
TAKE 1 TABLET BY MOUTH TWICE DAILY FOR HIGH BLOOD PRESSURE  
  
 hydroCHLOROthiazide 25 mg tablet Commonly known as:  HYDRODIURIL  
TAKE 1 TABLET BY MOUTH DAILY losartan 100 mg tablet Commonly known as:  COZAAR  
TAKE 1 TABLET BY MOUTH DAILY FOR HIGH BLOOD PRESSURE  
  
 metoprolol succinate 50 mg XL tablet Commonly known as:  TOPROL-XL  
TAKE 1 TABLET BY MOUTH DAILY  
  
 omeprazole 20 mg capsule Commonly known as:  PRILOSEC  
TAKE ONE CAPSULE BY MOUTH DAILY  
  
 sertraline 50 mg tablet Commonly known as:  ZOLOFT Take 2 Tabs by mouth daily. Follow-up Instructions Return in about 1 month (around 3/26/2018), or if symptoms worsen or fail to improve, for htn. Introducing [facility]! Can Do introduces [employer] patient portal. Now you can access parts of your medical record, email your doctor's office, and request medication refills online. 1. In your internet browser, go to [email or web address] 2. Click on the First Time User? Click Here link in the Sign In box. You will see the New Member Sign Up page. 3. Enter your [employer] Access Code exactly as it appears below. You will not need to use this code after youve completed the sign-up process. If you do not sign up before the expiration date, you must request a new code. · [employer] Access Code: [email or web address] Expires: 5/21/2018 11:01 AM 
 
4. Enter the last four digits of your Social Security Number (xxxx) and Date of Birth (mm/dd/yyyy) as indicated and click Submit. You will be taken to the next sign-up page. 5. Create a [employer] ID. This will be your [employer] login ID and cannot be changed, so think of one that is secure and easy to remember. 6. Create a [employer] password. You can change your password at any time. 7. Enter your Password Reset Question and Answer. This can be used at a later time if you forget your password. 8. Enter your e-mail address. You will receive e-mail notification when new information is available in [address] 9. Click Sign Up. You can now view and download portions of your medical record. 10. Click the Download Summary menu link to download a portable copy of your medical information. If you have questions, please visit the Frequently Asked Questions section of the [employer] website. Remember, [employer] is NOT to be used for urgent needs. For medical emergencies, dial 911. Now available from your iPhone and Android! Please provide this summary of care documentation to your next provider. Your primary care clinician is listed as Cami Mcgee. If you have any questions after today's visit, please call [phone].

## 2018-03-12 DIAGNOSIS — I10 ESSENTIAL HYPERTENSION: ICD-10-CM

## 2018-03-13 RX ORDER — HYDROCHLOROTHIAZIDE 25 MG/1
TABLET ORAL
Qty: 60 TAB | Refills: 0 | Status: SHIPPED | OUTPATIENT
Start: 2018-03-13 | End: 2019-01-30 | Stop reason: SDUPTHER

## 2018-03-18 DIAGNOSIS — F39 MOOD DISORDER (HCC): ICD-10-CM

## 2018-03-19 RX ORDER — SERTRALINE HYDROCHLORIDE 50 MG/1
TABLET, FILM COATED ORAL
Qty: 135 TAB | Refills: 0 | Status: SHIPPED | OUTPATIENT
Start: 2018-03-19 | End: 2018-05-26 | Stop reason: SDUPTHER

## 2018-03-20 RX ORDER — AMLODIPINE BESYLATE 10 MG/1
TABLET ORAL
Qty: 90 TAB | Refills: 0 | Status: SHIPPED | OUTPATIENT
Start: 2018-03-20 | End: 2018-08-06 | Stop reason: SDUPTHER

## 2018-04-02 ENCOUNTER — OFFICE VISIT (OUTPATIENT)
Dept: FAMILY MEDICINE CLINIC | Age: 68
End: 2018-04-02

## 2018-04-02 VITALS
HEIGHT: 65 IN | SYSTOLIC BLOOD PRESSURE: 150 MMHG | RESPIRATION RATE: 18 BRPM | DIASTOLIC BLOOD PRESSURE: 92 MMHG | HEART RATE: 67 BPM | TEMPERATURE: 97.4 F | WEIGHT: 189 LBS | BODY MASS INDEX: 31.49 KG/M2 | OXYGEN SATURATION: 95 %

## 2018-04-02 DIAGNOSIS — M25.562 CHRONIC PAIN OF BOTH KNEES: ICD-10-CM

## 2018-04-02 DIAGNOSIS — M25.561 CHRONIC PAIN OF BOTH KNEES: ICD-10-CM

## 2018-04-02 DIAGNOSIS — H54.7 DECREASED VISUAL ACUITY: ICD-10-CM

## 2018-04-02 DIAGNOSIS — G89.29 CHRONIC PAIN OF BOTH KNEES: ICD-10-CM

## 2018-04-02 DIAGNOSIS — I10 ESSENTIAL HYPERTENSION: ICD-10-CM

## 2018-04-02 DIAGNOSIS — F41.9 ANXIETY: ICD-10-CM

## 2018-04-02 DIAGNOSIS — E66.9 OBESITY (BMI 30-39.9): ICD-10-CM

## 2018-04-02 DIAGNOSIS — L29.9 ITCHING: ICD-10-CM

## 2018-04-02 DIAGNOSIS — R07.9 CHEST PAIN, UNSPECIFIED TYPE: Primary | ICD-10-CM

## 2018-04-02 RX ORDER — HYDRALAZINE HYDROCHLORIDE 25 MG/1
25 TABLET, FILM COATED ORAL 3 TIMES DAILY
Qty: 270 TAB | Refills: 2 | Status: SHIPPED | OUTPATIENT
Start: 2018-04-02 | End: 2018-06-04 | Stop reason: SDUPTHER

## 2018-04-02 RX ORDER — HYDROXYZINE 25 MG/1
25 TABLET, FILM COATED ORAL
Qty: 30 TAB | Refills: 1 | Status: SHIPPED | OUTPATIENT
Start: 2018-04-02 | End: 2018-04-12

## 2018-04-02 NOTE — PROGRESS NOTES
Chief Complaint   Patient presents with    Hypertension     follow-up    Chest Pain     on and off pain over a week now. 1. Have you been to the ER, urgent care clinic since your last visit? Hospitalized since your last visit? No    2. Have you seen or consulted any other health care providers outside of the [address] [place] [address] since your last visit? Include any pap smears or colon screening. No     HPI  [employer]. comes in for f/u care. Chest pain: Patient has been having chest pain. This comes on and off and has been ongoing for a week. It is at times on the left side but at times also in the mid chest.  Unclear triggers. He denies any strenuous activity. He also does feel chest tightness. No passing out or palpitations. He does have a history of anxiety and occasionally does feel the whole body is shaking. Denies diaphoresis. Denies cough, wheeze or shortness of breath. We did do an EKG that did have nonspecific T-wave abnormalities. No ST elevations or depressions. Currently patient does not have the chest pain. Given however persistence of symptoms he will benefit from cardiology evaluation. He does needs to do stress testing. Referral to cardiologist was  placed. HTN: Patient has a history of hypertension. Blood pressure still remains elevated. He is on various medications. I will increase the hydralazine to 25 milligrams to take 3 times a day. Continue taking the rest of your blood pressure medications. I will follow-up at next visit. Decreased visual acuity: Patient has decreased visual acuity. He also has photophobia. No eye discharge or redness. Denies eye pain. Patient is referred to the ophthalmologist.  Patient has bilateral knee pain. He has been seen by the orthopedist in the past due to DJD of the knees. Knee pain: He would like to hold off on a referral to the orthopedist for now. He will take over-the-counter medication for this.   He can use Tylenol arthritis as needed for the pain. No knee swelling. Anxiety: Patient has generalized anxiety. He does have stress due to events ongoing in his life. I will have him take some hydroxyzine. I will follow-up at next visit. He does take sertraline daily. Pruritus: Patient has generalized pruritus. No skin rash. Will put him on hydroxyzine. I will follow-up at next visit. Obesity: Patient has a BMI of 31.45. We discussed weight loss. Discussed normal BMI. He will exercise and do lifestyle modification and efforts to lose weight. Past Medical History  Past Medical History:   Diagnosis Date    BPH (benign prostatic hyperplasia)     Depression     Hypertension     Irritability and anger        Surgical History  Past Surgical History:   Procedure Laterality Date    COLONOSCOPY N/A 7/19/2017    COLONOSCOPY / [facility] OTHER SURGICAL  1993    plate placed in head due to injury    AL COLSC FLX W/REMOVAL LESION BY HOT BX FORCEPS N/A 07/19/2017    Dr. [place]        Medications  Current Outpatient Prescriptions   Medication Sig Dispense Refill    hydrALAZINE (APRESOLINE) 25 mg tablet Take 1 Tab by mouth three (3) times daily. 270 Tab 2    hydrOXYzine HCl (ATARAX) 25 mg tablet Take 1 Tab by mouth every eight (8) hours as needed for Itching for up to 10 days. 30 Tab 1    amLODIPine (NORVASC) 10 mg tablet TAKE 1 TABLET BY MOUTH DAILY FOR BLOOD PRESSURE 90 Tab 0    sertraline (ZOLOFT) 50 mg tablet TAKE 1 AND 1/2 TABLETS BY MOUTH DAILY 135 Tab 0    hydroCHLOROthiazide (HYDRODIURIL) 25 mg tablet TAKE 1 TABLET BY MOUTH DAILY 60 Tab 0    diclofenac potassium (CATAFLAM) 50 mg tablet Take 50 mg by mouth three (3) times daily.       omeprazole (PRILOSEC) 20 mg capsule TAKE ONE CAPSULE BY MOUTH DAILY 90 Cap 0    losartan (COZAAR) 100 mg tablet TAKE 1 TABLET BY MOUTH DAILY FOR HIGH BLOOD PRESSURE 90 Tab 0    atorvastatin (LIPITOR) 40 mg tablet TAKE 1 TABLET BY MOUTH DAILY 90 Tab 0    codeine-butalbital-acetaminophen-caffeine (FIORICET WITH CODEINE) -70-30 mg capsule TAKE 1 CAPSULE BY MOUTH EVERY 6 HOURS AS NEEDED FOR HEADACHE OR MIGRAINE 30 Cap 0    metoprolol succinate (TOPROL-XL) 50 mg XL tablet TAKE 1 TABLET BY MOUTH DAILY 90 Tab 3       Allergies  No Known Allergies    Family History  Family History   Problem Relation Age of Onset    Diabetes Mother     Heart Disease Father     Diabetes Father        Social History  Social History     Social History    Marital status: SINGLE     Spouse name: N/A    Number of children: N/A    Years of education: N/A     Occupational History    Not on file.      Social History Main Topics    Smoking status: Never Smoker    Smokeless tobacco: Never Used    Alcohol use No    Drug use: No    Sexual activity: Yes     Partners: Female     Other Topics Concern    Not on file     Social History Narrative       Review of Systems  Review of Systems - History obtained from spouse, chart review and the patient  General ROS: positive for  - fatigue and malaise  Psychological ROS: positive for - behavioral disorder  Ophthalmic ROS: positive for - decreased vision  ENT ROS: negative  Allergy and Immunology ROS: negative  Hematological and Lymphatic ROS: negative  Endocrine ROS: negative  Respiratory ROS: no cough, shortness of breath, or wheezing  Cardiovascular ROS: positive for - chest pain and paroxysmal nocturnal dyspnea  negative for - edema  Gastrointestinal ROS: no abdominal pain, change in bowel habits, or black or bloody stools  Genito-Urinary ROS: no dysuria, trouble voiding, or hematuria  Musculoskeletal ROS: positive for - joint pain and pain in knee - bilateral  Neurological ROS: negative  Dermatological ROS: positive for - pruritus    Vital Signs  Visit Vitals    BP (!) 150/92 (BP 1 Location: Left arm, BP Patient Position: Sitting)    Pulse 67    Temp 97.4 °F (36.3 °C) (Oral)    Resp 18    Ht 5' 5\" (1.651 m)    Wt 189 lb (85.7 kg)    SpO2 95%    BMI 31.45 kg/m2         Physical Exam  Physical Examination: General appearance - oriented to person, place, and time and acyanotic, in no respiratory distress  Mental status - affect appropriate to mood  Eyes - pupils equal and reactive, extraocular eye movements intact  Ears - bilateral TM's and external ear canals normal  Nose - normal and patent, no erythema, discharge or polyps  Mouth - mucous membranes moist, pharynx normal without lesions  Neck - supple, no significant adenopathy  Lymphatics - no palpable lymphadenopathy  Chest - no tachypnea, retractions or cyanosis  Heart - normal rate and regular rhythm, systolic murmur 2/6 at lower left sternal border  Abdomen - no rebound tenderness noted  Neurological - motor and sensory grossly normal bilaterally  Musculoskeletal - osteoarthritic changes noted in both hands  Extremities - intact peripheral pulses  Skin -patient has dry skin with scratch marks on upper and lower back    Results  Results for orders placed or performed in visit on 09/27/17   CULTURE, STOOL   Result Value Ref Range    Salmonella/Shigella Screen CANCELED     Campylobacter Culture CANCELED     E coli Shiga toxin CANCELED    CULTURE, STOOL   Result Value Ref Range    Salmonella/Shigella Screen CANCELED     Campylobacter Culture CANCELED     E coli Shiga toxin CANCELED    CBC WITH AUTOMATED DIFF   Result Value Ref Range    WBC 3.1 (L) 3.4 - 10.8 x10E3/uL    RBC 4.71 4.14 - 5.80 x10E6/uL    HGB 14.0 12.6 - 17.7 g/dL    HCT 42.1 37.5 - 51.0 %    MCV 89 79 - 97 fL    MCH 29.7 26.6 - 33.0 pg    MCHC 33.3 31.5 - 35.7 g/dL    RDW 14.8 12.3 - 15.4 %    PLATELET 171 256 - 997 x10E3/uL    NEUTROPHILS 41 %    Lymphocytes 45 %    MONOCYTES 9 %    EOSINOPHILS 5 %    BASOPHILS 0 %    ABS. NEUTROPHILS 1.3 (L) 1.4 - 7.0 x10E3/uL    Abs Lymphocytes 1.4 0.7 - 3.1 x10E3/uL    ABS. MONOCYTES 0.3 0.1 - 0.9 x10E3/uL    ABS.  EOSINOPHILS 0.1 0.0 - 0.4 x10E3/uL ABS. BASOPHILS 0.0 0.0 - 0.2 x10E3/uL    IMMATURE GRANULOCYTES 0 %    ABS. IMM. GRANS. 0.0 0.0 - 0.1 [id number]   METABOLIC PANEL, COMPREHENSIVE   Result Value Ref Range    Glucose 95 65 - 99 mg/dL    BUN 10 8 - 27 mg/dL    Creatinine 0.91 0.76 - 1.27 mg/dL    GFR est non-AA 87 >59 mL/min/1.73    GFR est  >59 mL/min/1.73    BUN/Creatinine ratio 11 10 - 24    Sodium 142 134 - 144 mmol/L    Potassium 3.8 3.5 - 5.2 mmol/L    Chloride 98 96 - 106 mmol/L    CO2 25 18 - 29 mmol/L    Calcium 9.4 8.6 - 10.2 mg/dL    Protein, total 7.8 6.0 - 8.5 g/dL    Albumin 4.5 3.6 - 4.8 g/dL    GLOBULIN, TOTAL 3.3 1.5 - 4.5 g/dL    A-G Ratio 1.4 1.2 - 2.2    Bilirubin, total 0.4 0.0 - 1.2 mg/dL    Alk. phosphatase 106 39 - 117 IU/L    AST (SGOT) 26 0 - 40 IU/L    ALT (SGPT) 23 0 - 44 IU/L   LIPID PANEL   Result Value Ref Range    Cholesterol, total 123 100 - 199 mg/dL    Triglyceride 212 (H) 0 - 149 mg/dL    HDL Cholesterol 42 >39 mg/dL    VLDL, calculated 42 (H) 5 - 40 mg/dL    LDL, calculated 39 0 - 99 mg/dL   CVD REPORT   Result Value Ref Range    INTERPRETATION Note    PLEASE NOTE   Result Value Ref Range    Please note Comment    PLEASE NOTE   Result Value Ref Range    Please note Comment        ASSESSMENT and PLAN    ICD-10-CM ICD-9-CM    1. Chest pain, unspecified type R07.9 786.50 AMB POC EKG ROUTINE W/ 12 LEADS, INTER & REP      REFERRAL TO CARDIOLOGY   2. Anxiety F41.9 300.00 hydrOXYzine HCl (ATARAX) 25 mg tablet   3. Itching L29.9 698.9 hydrOXYzine HCl (ATARAX) 25 mg tablet   4. Essential hypertension I10 401.9 hydrALAZINE (APRESOLINE) 25 mg tablet   5. Chronic pain of both knees M25.561 719.46     M25.562 338.29     G89.29     6. Decreased visual acuity H54.7 369.9    7. Obesity (BMI 30-39. 9) E66.9 278.00      lab results and schedule of future lab studies reviewed with patient  reviewed diet, exercise and weight control  cardiovascular risk and specific lipid/LDL goals reviewed  reviewed medications and side effects in detail  use of aspirin to prevent MI and TIA's discussed  Discussed the patient's BMI with him. The BMI follow up plan is as follows:     dietary management education, guidance, and counseling  encourage exercise  monitor weight    I have discussed the diagnosis with the patient and the intended plan of care as seen in the above orders. The patient has received an after-visit summary and questions were answered concerning future plans. I have discussed medication, side effects, and warnings with the patient in detail. The patient verbalized understanding and is in agreement with the plan of care. The patient will contact the office with any additional concerns.     Sasha Jason MD

## 2018-04-02 NOTE — MR AVS SNAPSHOT
[facility] [address] [facility] 
[phone] Patient: Dov Wilkinson. MRN: [id number] :1950 Visit Information Date & Time Provider Department Dept. Phone Encounter #  
 2018  9:00 AM MD Radha Titus [phone] [id number] Follow-up Instructions Return in about 1 month (around 2018), or if symptoms worsen or fail to improve, for htn, anxiety. Your Appointments 2018  9:00 AM  
ROUTINE CARE with MD Radha Titus ([address] [place] [address]) Appt Note: for htn; r/s from 3/26 at request of pt - r/s'ed back in 30 min slot - [facility]; give pt GLST # to reschedule appt [employer]. [address] [place] [address]  
  
   
 [employer] [facility]. [address] [facility] Upcoming Health Maintenance Date Due  
 GLAUCOMA SCREENING Q2Y 2014 ZOSTER VACCINE AGE 60> 10/20/2018* MEDICARE YEARLY EXAM 2019 COLONOSCOPY 2022 DTaP/Tdap/Td series (2 - Td) 2027 *Topic was postponed. The date shown is not the original due date. Allergies as of 2018  Review Complete On: 2018 By: Bruce Alex MD  
 No Known Allergies Current Immunizations  Reviewed on 2017 Name Date Influenza High Dose Vaccine PF 10/20/2017 Pneumococcal Polysaccharide (PPSV-23) 2018 Tdap 2017 Not reviewed this visit You Were Diagnosed With   
  
 Codes Comments Chest pain, unspecified type    -  Primary ICD-10-CM: R07.9 ICD-9-CM: 786.50 Anxiety     ICD-10-CM: F41.9 ICD-9-CM: 300.00 Itching     ICD-10-CM: L29.9 ICD-9-CM: 698.9 Essential hypertension     ICD-10-CM: I10 
ICD-9-CM: 401.9 Chronic pain of both knees     ICD-10-CM: M25.561, M25.562, G89.29 ICD-9-CM: 719.46, 338.29 Decreased visual acuity     ICD-10-CM: H54.7 ICD-9-CM: 369.9 Obesity (BMI 30-39. 9)     ICD-10-CM: E66.9 ICD-9-CM: 278.00 Vitals BP Pulse Temp Resp Height(growth percentile) Weight(growth percentile) (!) 152/92 (BP 1 Location: Left arm, BP Patient Position: Sitting) 67 97.4 °F (36.3 °C) (Oral) 18 5' 5\" (1.651 m) 189 lb (85.7 kg) SpO2 BMI Smoking Status 95% 31.45 kg/m2 Never Smoker BMI and BSA Data Body Mass Index Body Surface Area  
 31.45 kg/m 2 1.98 m 2 Preferred Pharmacy Pharmacy Name Phone [facility], [address] [place] [address] [phone] Your Updated Medication List  
  
   
This list is accurate as of 4/2/18  8:55 AM.  Always use your most recent med list. amLODIPine 10 mg tablet Commonly known as:  Remonia Grates TAKE 1 TABLET BY MOUTH DAILY FOR BLOOD PRESSURE  
  
 atorvastatin 40 mg tablet Commonly known as:  LIPITOR  
TAKE 1 TABLET BY MOUTH DAILY  
  
 codeine-butalbital-acetaminophen-caffeine -42-30 mg capsule Commonly known as:  FIORICET WITH CODEINE  
TAKE 1 CAPSULE BY MOUTH EVERY 6 HOURS AS NEEDED FOR HEADACHE OR MIGRAINE  
  
 diclofenac potassium 50 mg tablet Commonly known as:  CATAFLAM  
Take 50 mg by mouth three (3) times daily. hydrALAZINE 25 mg tablet Commonly known as:  APRESOLINE Take 1 Tab by mouth three (3) times daily. hydroCHLOROthiazide 25 mg tablet Commonly known as:  HYDRODIURIL  
TAKE 1 TABLET BY MOUTH DAILY  
  
 hydrOXYzine HCl 25 mg tablet Commonly known as:  ATARAX Take 1 Tab by mouth every eight (8) hours as needed for Itching for up to 10 days. losartan 100 mg tablet Commonly known as:  COZAAR  
TAKE 1 TABLET BY MOUTH DAILY FOR HIGH BLOOD PRESSURE  
  
 metoprolol succinate 50 mg XL tablet Commonly known as:  TOPROL-XL  
TAKE 1 TABLET BY MOUTH DAILY  
  
 omeprazole 20 mg capsule Commonly known as:  PRILOSEC  
TAKE ONE CAPSULE BY MOUTH DAILY sertraline 50 mg tablet Commonly known as:  ZOLOFT  
TAKE 1 AND 1/2 TABLETS BY MOUTH DAILY Prescriptions Sent to Pharmacy Refills  
 hydrALAZINE (APRESOLINE) 25 mg tablet 2 Sig: Take 1 Tab by mouth three (3) times daily. Class: Normal  
 Pharmacy: [address] #: [phone] Route: Oral  
 hydrOXYzine HCl (ATARAX) 25 mg tablet 1 Sig: Take 1 Tab by mouth every eight (8) hours as needed for Itching for up to 10 days. Class: Normal  
 Pharmacy: [facility] [address] #: [phone] Route: Oral  
  
We Performed the Following AMB POC EKG ROUTINE W/ 12 LEADS, INTER & REP [85506 CPT(R)] REFERRAL TO CARDIOLOGY [MKP79 Custom] Comments:  
 Atypical chest pain Follow-up Instructions Return in about 1 month (around 5/2/2018), or if symptoms worsen or fail to improve, for htn, anxiety. Referral Information Referral ID Referred By Referred To  
  
 [id number] Heydi Guallpa MD   
   [address] [place] [address] [address], [address] [place] [address]  Phone: [phone] Fax: [phone] Visits Status Start Date End Date 1 New Request 4/2/18 4/2/19 If your referral has a status of pending review or denied, additional information will be sent to support the outcome of this decision. Patient Instructions Body Mass Index: Care Instructions Your Care Instructions Body mass index (BMI) can help you see if your weight is raising your risk for health problems. It uses a formula to compare how much you weigh with how tall you are. · A BMI lower than 18.5 is considered underweight. · A BMI between 18.5 and 24.9 is considered healthy. · A BMI between 25 and 29.9 is considered overweight. A BMI of 30 or higher is considered obese. If your BMI is in the normal range, it means that you have a lower risk for weight-related health problems. If your BMI is in the overweight or obese range, you may be at increased risk for weight-related health problems, such as high blood pressure, heart disease, stroke, arthritis or joint pain, and diabetes. If your BMI is in the underweight range, you may be at increased risk for health problems such as fatigue, lower protection (immunity) against illness, muscle loss, bone loss, hair loss, and hormone problems. BMI is just one measure of your risk for weight-related health problems. You may be at higher risk for health problems if you are not active, you eat an unhealthy diet, or you drink too much alcohol or use tobacco products. Follow-up care is a key part of your treatment and safety. Be sure to make and go to all appointments, and call your doctor if you are having problems. It's also a good idea to know your test results and keep a list of the medicines you take. How can you care for yourself at home? · Practice healthy eating habits. This includes eating plenty of fruits, vegetables, whole grains, lean protein, and low-fat dairy. · If your doctor recommends it, get more exercise. Walking is a good choice. Bit by bit, increase the amount you walk every day. Try for at least 30 minutes on most days of the week. · Do not smoke. Smoking can increase your risk for health problems. If you need help quitting, talk to your doctor about stop-smoking programs and medicines. These can increase your chances of quitting for good. · Limit alcohol to 2 drinks a day for men and 1 drink a day for women. Too much alcohol can cause health problems. If you have a BMI higher than 25 · Your doctor may do other tests to check your risk for weight-related health problems.  This may include measuring the distance around your waist. A waist measurement of more than 40 inches in men or 35 inches in women can increase the risk of weight-related health problems. · Talk with your doctor about steps you can take to stay healthy or improve your health. You may need to make lifestyle changes to lose weight and stay healthy, such as changing your diet and getting regular exercise. If you have a BMI lower than 18.5 · Your doctor may do other tests to check your risk for health problems. · Talk with your doctor about steps you can take to stay healthy or improve your health. You may need to make lifestyle changes to gain or maintain weight and stay healthy, such as getting more healthy foods in your diet and doing exercises to build muscle. Where can you learn more? Go to http://lavonne-frances.info/. Enter S176 in the search box to learn more about \"Body Mass Index: Care Instructions. \" Current as of: October 13, 2016 Content Version: 11.4 © 7373-2594 [employer]. Care instructions adapted under license by [employer] (which disclaims liability or warranty for this information). If you have questions about a medical condition or this instruction, always ask your healthcare professional. [place] [address] any warranty or liability for your use of this information. Introducing [address]! [facility] introduces SecondHome patient portal. Now you can access parts of your medical record, email your doctor's office, and request medication refills online. 1. In your internet browser, go to [email or web address] 2. Click on the First Time User? Click Here link in the Sign In box. You will see the New Member Sign Up page. 3. Enter your SecondHome Access Code exactly as it appears below. You will not need to use this code after youve completed the sign-up process. If you do not sign up before the expiration date, you must request a new code. · SecondHome Access Code: [email or web address] Expires: 5/21/2018 12:01 PM 
 
 4. Enter the last four digits of your Social Security Number (xxxx) and Date of Birth (mm/dd/yyyy) as indicated and click Submit. You will be taken to the next sign-up page. 5. Create a MascotaNube ID. This will be your MascotaNube login ID and cannot be changed, so think of one that is secure and easy to remember. 6. Create a MascotaNube password. You can change your password at any time. 7. Enter your Password Reset Question and Answer. This can be used at a later time if you forget your password. 8. Enter your e-mail address. You will receive e-mail notification when new information is available in [address] 9. Click Sign Up. You can now view and download portions of your medical record. 10. Click the Download Summary menu link to download a portable copy of your medical information. If you have questions, please visit the Frequently Asked Questions section of the MascotaNube website. Remember, MascotaNube is NOT to be used for urgent needs. For medical emergencies, dial 911. Now available from your iPhone and Android! Please provide this summary of care documentation to your next provider. Your primary care clinician is listed as Cami Mcgee. If you have any questions after today's visit, please call [phone].

## 2018-04-02 NOTE — PATIENT INSTRUCTIONS
Body Mass Index: Care Instructions  Your Care Instructions    Body mass index (BMI) can help you see if your weight is raising your risk for health problems. It uses a formula to compare how much you weigh with how tall you are. · A BMI lower than 18.5 is considered underweight. · A BMI between 18.5 and 24.9 is considered healthy. · A BMI between 25 and 29.9 is considered overweight. A BMI of 30 or higher is considered obese. If your BMI is in the normal range, it means that you have a lower risk for weight-related health problems. If your BMI is in the overweight or obese range, you may be at increased risk for weight-related health problems, such as high blood pressure, heart disease, stroke, arthritis or joint pain, and diabetes. If your BMI is in the underweight range, you may be at increased risk for health problems such as fatigue, lower protection (immunity) against illness, muscle loss, bone loss, hair loss, and hormone problems. BMI is just one measure of your risk for weight-related health problems. You may be at higher risk for health problems if you are not active, you eat an unhealthy diet, or you drink too much alcohol or use tobacco products. Follow-up care is a key part of your treatment and safety. Be sure to make and go to all appointments, and call your doctor if you are having problems. It's also a good idea to know your test results and keep a list of the medicines you take. How can you care for yourself at home? · Practice healthy eating habits. This includes eating plenty of fruits, vegetables, whole grains, lean protein, and low-fat dairy. · If your doctor recommends it, get more exercise. Walking is a good choice. Bit by bit, increase the amount you walk every day. Try for at least 30 minutes on most days of the week. · Do not smoke. Smoking can increase your risk for health problems. If you need help quitting, talk to your doctor about stop-smoking programs and medicines. These can increase your chances of quitting for good. · Limit alcohol to 2 drinks a day for men and 1 drink a day for women. Too much alcohol can cause health problems. If you have a BMI higher than 25  · Your doctor may do other tests to check your risk for weight-related health problems. This may include measuring the distance around your waist. A waist measurement of more than 40 inches in men or 35 inches in women can increase the risk of weight-related health problems. · Talk with your doctor about steps you can take to stay healthy or improve your health. You may need to make lifestyle changes to lose weight and stay healthy, such as changing your diet and getting regular exercise. If you have a BMI lower than 18.5  · Your doctor may do other tests to check your risk for health problems. · Talk with your doctor about steps you can take to stay healthy or improve your health. You may need to make lifestyle changes to gain or maintain weight and stay healthy, such as getting more healthy foods in your diet and doing exercises to build muscle. Where can you learn more? Go to http://lavonne-frances.info/. Enter S176 in the search box to learn more about \"Body Mass Index: Care Instructions. \"  Current as of: October 13, 2016  Content Version: 11.4  © 5251-1586 Healthwise[employer]. Care instructions adapted under license by [employer] (which disclaims liability or warranty for this information). If you have questions about a medical condition or this instruction, always ask your healthcare professional. [address] any warranty or liability for your use of this information.

## 2018-04-04 ENCOUNTER — OFFICE VISIT (OUTPATIENT)
Dept: CARDIOLOGY CLINIC | Age: 68
End: 2018-04-04

## 2018-04-04 VITALS
SYSTOLIC BLOOD PRESSURE: 124 MMHG | DIASTOLIC BLOOD PRESSURE: 81 MMHG | HEART RATE: 59 BPM | BODY MASS INDEX: 31.65 KG/M2 | WEIGHT: 190 LBS | HEIGHT: 65 IN

## 2018-04-04 DIAGNOSIS — R42 DIZZINESS: ICD-10-CM

## 2018-04-04 DIAGNOSIS — E78.5 DYSLIPIDEMIA: ICD-10-CM

## 2018-04-04 DIAGNOSIS — I10 ESSENTIAL HYPERTENSION: ICD-10-CM

## 2018-04-04 DIAGNOSIS — R06.02 SOB (SHORTNESS OF BREATH) ON EXERTION: ICD-10-CM

## 2018-04-04 DIAGNOSIS — R07.89 OTHER CHEST PAIN: Primary | ICD-10-CM

## 2018-04-04 RX ORDER — GUAIFENESIN 100 MG/5ML
81 LIQUID (ML) ORAL DAILY
Qty: 100 TAB
Start: 2018-04-04 | End: 2018-04-11

## 2018-04-04 NOTE — LETTER
[facility]. 
1950 4/4/2018 Dear MD Kate Arora MD Tasia Drape, MD 
 
I had the pleasure of evaluating  Mr. Thompson in office today. Below are the relevant portions of my assessment and plan of care. ICD-10-CM ICD-9-CM 1. Other chest pain R07.89 786.59 2D ECHO COMPLETE ADULT (TTE) SCHEDULE NUCLEAR STUDY  
   aspirin 81 mg chewable tablet 2. Dizziness R42 780.4 4/18 mild orthostasis but no Sx in office 3. SOB (shortness of breath) on exertion R06.02 786.05 2D ECHO COMPLETE ADULT (TTE) 4. Essential hypertension P47 766.0 METABOLIC PANEL, BASIC  
   CBC W/O DIFF  
 controlled 5. Dyslipidemia E78.5 272.4 LIPID PANEL  
   HEPATIC FUNCTION PANEL On statins; lipids in jan and sept 2017 very different?lab error 6. Essential hypertension Z19 146.7 METABOLIC PANEL, BASIC  
   CBC W/O DIFF Current Outpatient Prescriptions Medication Sig Dispense Refill  aspirin 81 mg chewable tablet Take 1 Tab by mouth daily. 100 Tab prn  hydrALAZINE (APRESOLINE) 25 mg tablet Take 1 Tab by mouth three (3) times daily. 270 Tab 2  
 hydrOXYzine HCl (ATARAX) 25 mg tablet Take 1 Tab by mouth every eight (8) hours as needed for Itching for up to 10 days. 30 Tab 1  
 amLODIPine (NORVASC) 10 mg tablet TAKE 1 TABLET BY MOUTH DAILY FOR BLOOD PRESSURE 90 Tab 0  
 sertraline (ZOLOFT) 50 mg tablet TAKE 1 AND 1/2 TABLETS BY MOUTH DAILY 135 Tab 0  
 hydroCHLOROthiazide (HYDRODIURIL) 25 mg tablet TAKE 1 TABLET BY MOUTH DAILY 60 Tab 0  
 diclofenac potassium (CATAFLAM) 50 mg tablet Take 50 mg by mouth three (3) times daily.     
 omeprazole (PRILOSEC) 20 mg capsule TAKE ONE CAPSULE BY MOUTH DAILY 90 Cap 0  
 losartan (COZAAR) 100 mg tablet TAKE 1 TABLET BY MOUTH DAILY FOR HIGH BLOOD PRESSURE 90 Tab 0  
 atorvastatin (LIPITOR) 40 mg tablet TAKE 1 TABLET BY MOUTH DAILY 90 Tab 0  
 codeine-butalbital-acetaminophen-caffeine (FIORICET WITH CODEINE) -43-30 mg capsule TAKE 1 CAPSULE BY MOUTH EVERY 6 HOURS AS NEEDED FOR HEADACHE OR MIGRAINE 30 Cap 0  
 metoprolol succinate (TOPROL-XL) 50 mg XL tablet TAKE 1 TABLET BY MOUTH DAILY 90 Tab 3 Orders Placed This Encounter  METABOLIC PANEL, BASIC Standing Status:   Future Standing Expiration Date:   7/5/2018  CBC W/O DIFF Standing Status:   Future Standing Expiration Date:   7/5/2018  LIPID PANEL Standing Status:   Future Standing Expiration Date:   7/5/2018  
 HEPATIC FUNCTION PANEL Standing Status:   Future Standing Expiration Date:   7/5/2018  SCHEDULE NUCLEAR STUDY  
  exercise  2D ECHO COMPLETE ADULT (TTE) Standing Status:   Future Standing Expiration Date:   10/1/2018 Order Specific Question:   Reason for Exam: Answer:   cp, sob  aspirin 81 mg chewable tablet Sig: Take 1 Tab by mouth daily. Dispense:  100 Tab Refill:  prn If you have questions, please do not hesitate to call me. I look forward to following Mr. Thompson along with you. Sincerely, Javid Orantes MD

## 2018-04-04 NOTE — PATIENT INSTRUCTIONS
There are no discontinued medications. Chest Pain: Care Instructions  Your Care Instructions    There are many things that can cause chest pain. Some are not serious and will get better on their own in a few days. But some kinds of chest pain need more testing and treatment. Your doctor may have recommended a follow-up visit in the next 8 to 12 hours. If you are not getting better, you may need more tests or treatment. Even though your doctor has released you, you still need to watch for any problems. The doctor carefully checked you, but sometimes problems can develop later. If you have new symptoms or if your symptoms do not get better, get medical care right away. If you have worse or different chest pain or pressure that lasts more than 5 minutes or you passed out (lost consciousness), call 911 or seek other emergency help right away. A medical visit is only one step in your treatment. Even if you feel better, you still need to do what your doctor recommends, such as going to all suggested follow-up appointments and taking medicines exactly as directed. This will help you recover and help prevent future problems. How can you care for yourself at home? · Rest until you feel better. · Take your medicine exactly as prescribed. Call your doctor if you think you are having a problem with your medicine. · Do not drive after taking a prescription pain medicine. When should you call for help? Call 911 if:  ? · You passed out (lost consciousness). ? · You have severe difficulty breathing. ? · You have symptoms of a heart attack. These may include:  ¨ Chest pain or pressure, or a strange feeling in your chest.  ¨ Sweating. ¨ Shortness of breath. ¨ Nausea or vomiting. ¨ Pain, pressure, or a strange feeling in your back, neck, jaw, or upper belly or in one or both shoulders or arms. ¨ Lightheadedness or sudden weakness. ¨ A fast or irregular heartbeat.   After you call 911, the  may tell you to chew 1 adult-strength or 2 to 4 low-dose aspirin. Wait for an ambulance. Do not try to drive yourself. ?Call your doctor today if:  ? · You have any trouble breathing. ? · Your chest pain gets worse. ? · You are dizzy or lightheaded, or you feel like you may faint. ? · You are not getting better as expected. ? · You are having new or different chest pain. Where can you learn more? Go to http://lavonne-frances.info/. Enter A120 in the search box to learn more about \"Chest Pain: Care Instructions. \"  Current as of: March 20, 2017  Content Version: 11.4  © 7021-6992 [employer]. Care instructions adapted under license by [employer] (which disclaims liability or warranty for this information). If you have questions about a medical condition or this instruction, always ask your healthcare professional. [address] any warranty or liability for your use of this information.

## 2018-04-04 NOTE — MR AVS SNAPSHOT
[address] [facility] 
 
 
 [address] [facility] 
[phone] Patient: Toy Rajan. MRN: [id number] :1950 Visit Information Date & Time Provider Department Dept. Phone Encounter #  
 2018  9:45 AM Lesvia Farah MD [facility] [phone] Follow-up Instructions Return in about 1 week (around 2018), or if symptoms worsen or fail to improve, for same day post test.  
  
Your Appointments 2018  8:15 AM  
ROUTINE CARE with Cami Mireles MD  
[facility] ([address]) Appt Note: for htn, anxiety [address] [facility] 49  
  
   
 [address] [facility] Upcoming Health Maintenance Date Due  
 GLAUCOMA SCREENING Q2Y 2014 ZOSTER VACCINE AGE 60> 10/20/2018* MEDICARE YEARLY EXAM 2019 COLONOSCOPY 2022 DTaP/Tdap/Td series (2 - Td) 2027 *Topic was postponed. The date shown is not the original due date. Allergies as of 2018  Review Complete On: 2018 By: Lesvia Farah MD  
 No Known Allergies Current Immunizations  Reviewed on 2017 Name Date Influenza High Dose Vaccine PF 10/20/2017 Pneumococcal Polysaccharide (PPSV-23) 2018 Tdap 2017 Not reviewed this visit You Were Diagnosed With   
  
 Codes Comments Other chest pain    -  Primary ICD-10-CM: R07.89 ICD-9-CM: 786.59 Dizziness     ICD-10-CM: V66 ICD-9-CM: 780.4  mild orthostasis but no Sx in office SOB (shortness of breath) on exertion     ICD-10-CM: R06.02 
ICD-9-CM: 786.05 Essential hypertension     ICD-10-CM: I10 
ICD-9-CM: 401.9 controlled Dyslipidemia     ICD-10-CM: E78.5 ICD-9-CM: 272.4 On statins; lipids in  and 2017 very different?lab error Essential hypertension     ICD-10-CM: I10 
ICD-9-CM: 401.9 Vitals BP Pulse Height(growth percentile) Weight(growth percentile) BMI Smoking Status 124/81 (BP 1 Location: Right arm, BP Patient Position: Standing) (!) 59 5' 5\" (1.651 m) 190 lb (86.2 kg) 31.62 kg/m2 Never Smoker Vitals History BMI and BSA Data Body Mass Index Body Surface Area  
 31.62 kg/m 2 1.99 m 2 Preferred Pharmacy Pharmacy Name Phone [facility], [address] [place] [address] [phone] Your Updated Medication List  
  
   
This list is accurate as of 4/4/18 10:47 AM.  Always use your most recent med list. amLODIPine 10 mg tablet Commonly known as:  Sara Ronaldo TAKE 1 TABLET BY MOUTH DAILY FOR BLOOD PRESSURE  
  
 aspirin 81 mg chewable tablet Take 1 Tab by mouth daily. atorvastatin 40 mg tablet Commonly known as:  LIPITOR  
TAKE 1 TABLET BY MOUTH DAILY  
  
 codeine-butalbital-acetaminophen-caffeine -05-30 mg capsule Commonly known as:  FIORICET WITH CODEINE  
TAKE 1 CAPSULE BY MOUTH EVERY 6 HOURS AS NEEDED FOR HEADACHE OR MIGRAINE  
  
 diclofenac potassium 50 mg tablet Commonly known as:  CATAFLAM  
Take 50 mg by mouth three (3) times daily. hydrALAZINE 25 mg tablet Commonly known as:  APRESOLINE Take 1 Tab by mouth three (3) times daily. hydroCHLOROthiazide 25 mg tablet Commonly known as:  HYDRODIURIL  
TAKE 1 TABLET BY MOUTH DAILY  
  
 hydrOXYzine HCl 25 mg tablet Commonly known as:  ATARAX Take 1 Tab by mouth every eight (8) hours as needed for Itching for up to 10 days. losartan 100 mg tablet Commonly known as:  COZAAR  
TAKE 1 TABLET BY MOUTH DAILY FOR HIGH BLOOD PRESSURE  
  
 metoprolol succinate 50 mg XL tablet Commonly known as:  TOPROL-XL  
TAKE 1 TABLET BY MOUTH DAILY  
  
 omeprazole 20 mg capsule Commonly known as:  PRILOSEC  
TAKE ONE CAPSULE BY MOUTH DAILY  
  
 sertraline 50 mg tablet Commonly known as:  ZOLOFT  
 TAKE 1 AND 1/2 TABLETS BY MOUTH DAILY We Performed the Following SCHEDULE NUCLEAR STUDY [[id number] Custom] Comments:  
 exercise Follow-up Instructions Return in about 1 week (around 4/11/2018), or if symptoms worsen or fail to improve, for same day post test.  
  
To-Do List   
 Around 04/04/2018 Lab:  CBC W/O DIFF Around 04/04/2018 Lab:  HEPATIC FUNCTION PANEL Around 04/04/2018 Lab:  LIPID PANEL Around 04/04/2018 Lab:  METABOLIC PANEL, BASIC Around 04/07/2018 Cardiac Services:  2D ECHO COMPLETE ADULT (TTE) Patient Instructions There are no discontinued medications. Chest Pain: Care Instructions Your Care Instructions There are many things that can cause chest pain. Some are not serious and will get better on their own in a few days. But some kinds of chest pain need more testing and treatment. Your doctor may have recommended a follow-up visit in the next 8 to 12 hours. If you are not getting better, you may need more tests or treatment. Even though your doctor has released you, you still need to watch for any problems. The doctor carefully checked you, but sometimes problems can develop later. If you have new symptoms or if your symptoms do not get better, get medical care right away. If you have worse or different chest pain or pressure that lasts more than 5 minutes or you passed out (lost consciousness), call 911 or seek other emergency help right away. A medical visit is only one step in your treatment. Even if you feel better, you still need to do what your doctor recommends, such as going to all suggested follow-up appointments and taking medicines exactly as directed. This will help you recover and help prevent future problems. How can you care for yourself at home? · Rest until you feel better. · Take your medicine exactly as prescribed.  Call your doctor if you think you are having a problem with your medicine. · Do not drive after taking a prescription pain medicine. When should you call for help? Call 911 if: 
? · You passed out (lost consciousness). ? · You have severe difficulty breathing. ? · You have symptoms of a heart attack. These may include: ¨ Chest pain or pressure, or a strange feeling in your chest. 
¨ Sweating. ¨ Shortness of breath. ¨ Nausea or vomiting. ¨ Pain, pressure, or a strange feeling in your back, neck, jaw, or upper belly or in one or both shoulders or arms. ¨ Lightheadedness or sudden weakness. ¨ A fast or irregular heartbeat. After you call 911, the  may tell you to chew 1 adult-strength or 2 to 4 low-dose aspirin. Wait for an ambulance. Do not try to drive yourself. ?Call your doctor today if: 
? · You have any trouble breathing. ? · Your chest pain gets worse. ? · You are dizzy or lightheaded, or you feel like you may faint. ? · You are not getting better as expected. ? · You are having new or different chest pain. Where can you learn more? Go to http://lavonne-frances.info/. Enter A120 in the search box to learn more about \"Chest Pain: Care Instructions. \" Current as of: March 20, 2017 Content Version: 11.4 © 4402-2420 [employer]. Care instructions adapted under license by [employer] (which disclaims liability or warranty for this information). If you have questions about a medical condition or this instruction, always ask your healthcare professional. [place] [address] any warranty or liability for your use of this information. Introducing [facility]! [employer] introduces Appsee patient portal. Now you can access parts of your medical record, email your doctor's office, and request medication refills online. 1. In your internet browser, go to [email or web address]. Kera/[employer] 2. Click on the First Time User? Click Here link in the Sign In box. You will see the New Member Sign Up page. 3. Enter your Aldera Access Code exactly as it appears below. You will not need to use this code after youve completed the sign-up process. If you do not sign up before the expiration date, you must request a new code. · Aldera Access Code: [email or web address] Expires: 5/21/2018 12:01 PM 
 
4. Enter the last four digits of your Social Security Number (xxxx) and Date of Birth (mm/dd/yyyy) as indicated and click Submit. You will be taken to the next sign-up page. 5. Create a Aldera ID. This will be your Aldera login ID and cannot be changed, so think of one that is secure and easy to remember. 6. Create a Aldera password. You can change your password at any time. 7. Enter your Password Reset Question and Answer. This can be used at a later time if you forget your password. 8. Enter your e-mail address. You will receive e-mail notification when new information is available in [address] 9. Click Sign Up. You can now view and download portions of your medical record. 10. Click the Download Summary menu link to download a portable copy of your medical information. If you have questions, please visit the Frequently Asked Questions section of the Aldera website. Remember, Aldera is NOT to be used for urgent needs. For medical emergencies, dial 911. Now available from your iPhone and Android! Please provide this summary of care documentation to your next provider. Your primary care clinician is listed as Cami Mcgee. If you have any questions after today's visit, please call [phone].

## 2018-04-08 DIAGNOSIS — G89.29 CHRONIC PAIN OF RIGHT KNEE: ICD-10-CM

## 2018-04-08 DIAGNOSIS — M25.561 CHRONIC PAIN OF RIGHT KNEE: ICD-10-CM

## 2018-04-09 RX ORDER — DICLOFENAC SODIUM 50 MG/1
TABLET, DELAYED RELEASE ORAL
Qty: 180 TAB | Refills: 0 | Status: SHIPPED | OUTPATIENT
Start: 2018-04-09 | End: 2018-08-06 | Stop reason: SDUPTHER

## 2018-04-11 ENCOUNTER — OFFICE VISIT (OUTPATIENT)
Dept: CARDIOLOGY CLINIC | Age: 68
End: 2018-04-11

## 2018-04-11 ENCOUNTER — CLINICAL SUPPORT (OUTPATIENT)
Dept: CARDIOLOGY CLINIC | Age: 68
End: 2018-04-11

## 2018-04-11 VITALS
BODY MASS INDEX: 31.82 KG/M2 | WEIGHT: 191 LBS | HEIGHT: 65 IN | HEART RATE: 63 BPM | DIASTOLIC BLOOD PRESSURE: 84 MMHG | SYSTOLIC BLOOD PRESSURE: 160 MMHG

## 2018-04-11 DIAGNOSIS — R07.89 OTHER CHEST PAIN: ICD-10-CM

## 2018-04-11 DIAGNOSIS — R42 DIZZINESS: ICD-10-CM

## 2018-04-11 DIAGNOSIS — R07.89 OTHER CHEST PAIN: Primary | ICD-10-CM

## 2018-04-11 DIAGNOSIS — E78.5 DYSLIPIDEMIA: Primary | ICD-10-CM

## 2018-04-11 DIAGNOSIS — R06.02 SOB (SHORTNESS OF BREATH) ON EXERTION: ICD-10-CM

## 2018-04-11 DIAGNOSIS — I10 ESSENTIAL HYPERTENSION: ICD-10-CM

## 2018-04-11 DIAGNOSIS — E66.9 OBESITY (BMI 30.0-34.9): ICD-10-CM

## 2018-04-11 NOTE — LETTER
[facility] [employer] 
1950 4/11/2018 Dear Ricardo Prakash MD 
 
I had the pleasure of evaluating  Mr. Thompson in office today. Below are the relevant portions of my assessment and plan of care. ICD-10-CM ICD-9-CM 1. Dyslipidemia E78.5 272.4 On statins; lipids in jan and sept 2017 very different?lab error f/u PCP 2. Essential hypertension I10 401.9   
 4/18 No meds today; 3. Other chest pain R07.89 786.59   
 4/18 improving, nl stress test 
  
4. SOB (shortness of breath) on exertion R06.02 786.05   
 4/18 improving; no w/u any more as stress and echo normal  
5. Obesity (BMI 30.0-34. 9) E66.9 278.00 Current Outpatient Prescriptions Medication Sig Dispense Refill  diclofenac EC (VOLTAREN) 50 mg EC tablet TAKE 1 TABLET BY MOUTH TWICE DAILY AS NEEDED 180 Tab 0  
 hydrALAZINE (APRESOLINE) 25 mg tablet Take 1 Tab by mouth three (3) times daily. 270 Tab 2  
 hydrOXYzine HCl (ATARAX) 25 mg tablet Take 1 Tab by mouth every eight (8) hours as needed for Itching for up to 10 days. 30 Tab 1  
 amLODIPine (NORVASC) 10 mg tablet TAKE 1 TABLET BY MOUTH DAILY FOR BLOOD PRESSURE 90 Tab 0  
 sertraline (ZOLOFT) 50 mg tablet TAKE 1 AND 1/2 TABLETS BY MOUTH DAILY 135 Tab 0  
 hydroCHLOROthiazide (HYDRODIURIL) 25 mg tablet TAKE 1 TABLET BY MOUTH DAILY 60 Tab 0  
 diclofenac potassium (CATAFLAM) 50 mg tablet Take 50 mg by mouth three (3) times daily.     
 omeprazole (PRILOSEC) 20 mg capsule TAKE ONE CAPSULE BY MOUTH DAILY 90 Cap 0  
 losartan (COZAAR) 100 mg tablet TAKE 1 TABLET BY MOUTH DAILY FOR HIGH BLOOD PRESSURE 90 Tab 0  
 atorvastatin (LIPITOR) 40 mg tablet TAKE 1 TABLET BY MOUTH DAILY 90 Tab 0  
 codeine-butalbital-acetaminophen-caffeine (FIORICET WITH CODEINE) -76-30 mg capsule TAKE 1 CAPSULE BY MOUTH EVERY 6 HOURS AS NEEDED FOR HEADACHE OR MIGRAINE 30 Cap 0  
 metoprolol succinate (TOPROL-XL) 50 mg XL tablet TAKE 1 TABLET BY MOUTH DAILY 90 Tab 3  
  regadenoson (LEXISCAN) 0.4 mg/5 mL syrg injection 5 mL by IntraVENous route once for 1 dose. 5 mL 0 No orders of the defined types were placed in this encounter. If you have questions, please do not hesitate to call me. I look forward to following Mr. Thompson along with you. Sincerely, Barrington Hicks MD

## 2018-04-11 NOTE — PATIENT INSTRUCTIONS
After the recommended changes have been made in blood pressure medicines, patient advised to keep BP/HR(pulse rate) chart twice daily and bring us results in next month or so. Patient may send the results via \"My Chart\" if desired. Please rest for 5-10 minutes before checking blood pressure    Medications Discontinued During This Encounter   Medication Reason    aspirin 81 mg chewable tablet Not A Current Medication            Body Mass Index: Care Instructions  Your Care Instructions    Body mass index (BMI) can help you see if your weight is raising your risk for health problems. It uses a formula to compare how much you weigh with how tall you are. · A BMI lower than 18.5 is considered underweight. · A BMI between 18.5 and 24.9 is considered healthy. · A BMI between 25 and 29.9 is considered overweight. A BMI of 30 or higher is considered obese. If your BMI is in the normal range, it means that you have a lower risk for weight-related health problems. If your BMI is in the overweight or obese range, you may be at increased risk for weight-related health problems, such as high blood pressure, heart disease, stroke, arthritis or joint pain, and diabetes. If your BMI is in the underweight range, you may be at increased risk for health problems such as fatigue, lower protection (immunity) against illness, muscle loss, bone loss, hair loss, and hormone problems. BMI is just one measure of your risk for weight-related health problems. You may be at higher risk for health problems if you are not active, you eat an unhealthy diet, or you drink too much alcohol or use tobacco products. Follow-up care is a key part of your treatment and safety. Be sure to make and go to all appointments, and call your doctor if you are having problems. It's also a good idea to know your test results and keep a list of the medicines you take. How can you care for yourself at home? · Practice healthy eating habits.  This includes eating plenty of fruits, vegetables, whole grains, lean protein, and low-fat dairy. · If your doctor recommends it, get more exercise. Walking is a good choice. Bit by bit, increase the amount you walk every day. Try for at least 30 minutes on most days of the week. · Do not smoke. Smoking can increase your risk for health problems. If you need help quitting, talk to your doctor about stop-smoking programs and medicines. These can increase your chances of quitting for good. · Limit alcohol to 2 drinks a day for men and 1 drink a day for women. Too much alcohol can cause health problems. If you have a BMI higher than 25  · Your doctor may do other tests to check your risk for weight-related health problems. This may include measuring the distance around your waist. A waist measurement of more than 40 inches in men or 35 inches in women can increase the risk of weight-related health problems. · Talk with your doctor about steps you can take to stay healthy or improve your health. You may need to make lifestyle changes to lose weight and stay healthy, such as changing your diet and getting regular exercise. If you have a BMI lower than 18.5  · Your doctor may do other tests to check your risk for health problems. · Talk with your doctor about steps you can take to stay healthy or improve your health. You may need to make lifestyle changes to gain or maintain weight and stay healthy, such as getting more healthy foods in your diet and doing exercises to build muscle. Where can you learn more? Go to http://lavonne-frances.info/. Enter S176 in the search box to learn more about \"Body Mass Index: Care Instructions. \"  Current as of: October 13, 2016  Content Version: 11.4  © 1197-5610 [employer]. Care instructions adapted under license by [employer] (which disclaims liability or warranty for this information).  If you have questions about a medical condition or this instruction, always ask your healthcare professional. [place] [address] any warranty or liability for your use of this information.

## 2018-04-11 NOTE — MR AVS SNAPSHOT
[address] [facility] 
 
 
 [address] [facility], [address] [facility] [address] 
[phone] Patient: Zhou Santizo. MRN: [id number] :1950 Visit Information Date & Time Provider Department Dept. Phone Encounter #  
 2018 12:45 PM Jean-Claude Cee MD [facility] [address] [place] [address] [id number] Follow-up Instructions Return in about 6 months (around 10/11/2018), or if symptoms worsen or fail to improve. Your Appointments 2018 12:45 PM  
Office Visit with Jean-Claude Cee MD  
[facility]) Appt Note: post stress and echo, same day [address] [facility], [address] [facility] [address]  
[address], [address] [facility] [address]  
  
    
 2018  8:15 AM  
ROUTINE CARE with Cami Delarosa MD  
[address] [facility]. ([facility] Appt Note: for htn, anxiety [address] [facility] 107 200 [facility]  
[phone]  
  
   
 [address] [facility]  
  
    
 10/19/2018  8:30 AM  
Office Visit with Jean-Claude Cee MD  
[facility] [place] ([facility]) Appt Note: 6 months [address] [place] [address] [address]  
  
   
 [address] [address] [place] [address] Upcoming Health Maintenance Date Due  
 GLAUCOMA SCREENING Q2Y 2014 ZOSTER VACCINE AGE 60> 10/20/2018* MEDICARE YEARLY EXAM 2019 COLONOSCOPY 2022 DTaP/Tdap/Td series (2 - Td) 2027 *Topic was postponed. The date shown is not the original due date. Allergies as of 2018  Review Complete On: 2018 By: Jean-Claude Cee MD  
 No Known Allergies Current Immunizations  Reviewed on 2017 Name Date Influenza High Dose Vaccine PF 10/20/2017 Pneumococcal Polysaccharide (PPSV-23) 2018 Tdap 2017 Not reviewed this visit You Were Diagnosed With   
  
 Codes Comments Dyslipidemia    -  Primary ICD-10-CM: E78.5 ICD-9-CM: 272.4 On statins; lipids in jan and sept 2017 very different?lab error f/u PCP Essential hypertension     ICD-10-CM: I10 
ICD-9-CM: 401.9 4/18 No meds today; Other chest pain     ICD-10-CM: R07.89 ICD-9-CM: 786.59 4/18 improving, nl stress test 
  
 SOB (shortness of breath) on exertion     ICD-10-CM: R06.02 
ICD-9-CM: 786.05 4/18 improving; no w/u any more as stress and echo normal  
 Obesity (BMI 30.0-34.9)     ICD-10-CM: K72.3 ICD-9-CM: 278.00 Vitals BP Pulse Height(growth percentile) Weight(growth percentile) BMI Smoking Status 160/84 63 5' 5\" (1.651 m) 191 lb (86.6 kg) 31.78 kg/m2 Never Smoker Vitals History BMI and BSA Data Body Mass Index Body Surface Area 31.78 kg/m 2 1.99 m 2 Preferred Pharmacy Pharmacy Name Phone [address] [facility], [address] [place] [address] [phone] Your Updated Medication List  
  
   
This list is accurate as of 4/11/18 12:15 PM.  Always use your most recent med list. amLODIPine 10 mg tablet Commonly known as:  Martines Inks TAKE 1 TABLET BY MOUTH DAILY FOR BLOOD PRESSURE  
  
 atorvastatin 40 mg tablet Commonly known as:  LIPITOR  
TAKE 1 TABLET BY MOUTH DAILY  
  
 codeine-butalbital-acetaminophen-caffeine -78-30 mg capsule Commonly known as:  FIORICET WITH CODEINE  
TAKE 1 CAPSULE BY MOUTH EVERY 6 HOURS AS NEEDED FOR HEADACHE OR MIGRAINE  
  
 diclofenac EC 50 mg EC tablet Commonly known as:  VOLTAREN  
TAKE 1 TABLET BY MOUTH TWICE DAILY AS NEEDED  
  
 diclofenac potassium 50 mg tablet Commonly known as:  CATAFLAM  
Take 50 mg by mouth three (3) times daily. hydrALAZINE 25 mg tablet Commonly known as:  APRESOLINE Take 1 Tab by mouth three (3) times daily. hydroCHLOROthiazide 25 mg tablet Commonly known as:  HYDRODIURIL  
 TAKE 1 TABLET BY MOUTH DAILY  
  
 hydrOXYzine HCl 25 mg tablet Commonly known as:  ATARAX Take 1 Tab by mouth every eight (8) hours as needed for Itching for up to 10 days. losartan 100 mg tablet Commonly known as:  COZAAR  
TAKE 1 TABLET BY MOUTH DAILY FOR HIGH BLOOD PRESSURE  
  
 metoprolol succinate 50 mg XL tablet Commonly known as:  TOPROL-XL  
TAKE 1 TABLET BY MOUTH DAILY  
  
 omeprazole 20 mg capsule Commonly known as:  PRILOSEC  
TAKE ONE CAPSULE BY MOUTH DAILY  
  
 regadenoson 0.4 mg/5 mL Syrg injection Commonly known as:  LEXISCAN  
5 mL by IntraVENous route once for 1 dose. sertraline 50 mg tablet Commonly known as:  ZOLOFT  
TAKE 1 AND 1/2 TABLETS BY MOUTH DAILY Follow-up Instructions Return in about 6 months (around 10/11/2018), or if symptoms worsen or fail to improve. Patient Instructions After the recommended changes have been made in blood pressure medicines, patient advised to keep BP/HR(pulse rate) chart twice daily and bring us results in next month or so. Patient may send the results via \"My Chart\" if desired. Please rest for 5-10 minutes before checking blood pressure Medications Discontinued During This Encounter Medication Reason  aspirin 81 mg chewable tablet Not A Current Medication Body Mass Index: Care Instructions Your Care Instructions Body mass index (BMI) can help you see if your weight is raising your risk for health problems. It uses a formula to compare how much you weigh with how tall you are. · A BMI lower than 18.5 is considered underweight. · A BMI between 18.5 and 24.9 is considered healthy. · A BMI between 25 and 29.9 is considered overweight. A BMI of 30 or higher is considered obese. If your BMI is in the normal range, it means that you have a lower risk for weight-related health problems.  If your BMI is in the overweight or obese range, you may be at increased risk for weight-related health problems, such as high blood pressure, heart disease, stroke, arthritis or joint pain, and diabetes. If your BMI is in the underweight range, you may be at increased risk for health problems such as fatigue, lower protection (immunity) against illness, muscle loss, bone loss, hair loss, and hormone problems. BMI is just one measure of your risk for weight-related health problems. You may be at higher risk for health problems if you are not active, you eat an unhealthy diet, or you drink too much alcohol or use tobacco products. Follow-up care is a key part of your treatment and safety. Be sure to make and go to all appointments, and call your doctor if you are having problems. It's also a good idea to know your test results and keep a list of the medicines you take. How can you care for yourself at home? · Practice healthy eating habits. This includes eating plenty of fruits, vegetables, whole grains, lean protein, and low-fat dairy. · If your doctor recommends it, get more exercise. Walking is a good choice. Bit by bit, increase the amount you walk every day. Try for at least 30 minutes on most days of the week. · Do not smoke. Smoking can increase your risk for health problems. If you need help quitting, talk to your doctor about stop-smoking programs and medicines. These can increase your chances of quitting for good. · Limit alcohol to 2 drinks a day for men and 1 drink a day for women. Too much alcohol can cause health problems. If you have a BMI higher than 25 · Your doctor may do other tests to check your risk for weight-related health problems. This may include measuring the distance around your waist. A waist measurement of more than 40 inches in men or 35 inches in women can increase the risk of weight-related health problems.  
· Talk with your doctor about steps you can take to stay healthy or improve your health. You may need to make lifestyle changes to lose weight and stay healthy, such as changing your diet and getting regular exercise. If you have a BMI lower than 18.5 · Your doctor may do other tests to check your risk for health problems. · Talk with your doctor about steps you can take to stay healthy or improve your health. You may need to make lifestyle changes to gain or maintain weight and stay healthy, such as getting more healthy foods in your diet and doing exercises to build muscle. Where can you learn more? Go to http://lavonne-frances.info/. Enter S176 in the search box to learn more about \"Body Mass Index: Care Instructions. \" Current as of: October 13, 2016 Content Version: 11.4 © 3030-7158 Healthwise, [employer]kipster. Care instructions adapted under license by [employer] (which disclaims liability or warranty for this information). If you have questions about a medical condition or this instruction, always ask your healthcare professional. [place] [address] any warranty or liability for your use of this information. Introducing [facility] [place] [facility]! [employer] introduces [employer] patient portal. Now you can access parts of your medical record, email your doctor's office, and request medication refills online. 1. In your internet browser, go to [email or web address]. [employer] 2. Click on the First Time User? Click Here link in the Sign In box. You will see the New Member Sign Up page. 3. Enter your [employer] Access Code exactly as it appears below. You will not need to use this code after youve completed the sign-up process. If you do not sign up before the expiration date, you must request a new code. · [employer] Access Code: [email or web address] Expires: 5/21/2018 12:01 PM 
 
4. Enter the last four digits of your Social Security Number (xxxx) and Date of Birth (mm/dd/yyyy) as indicated and click Submit.  You will be taken to the next sign-up page. 5. Create a ScriptRx ID. This will be your ScriptRx login ID and cannot be changed, so think of one that is secure and easy to remember. 6. Create a ScriptRx password. You can change your password at any time. 7. Enter your Password Reset Question and Answer. This can be used at a later time if you forget your password. 8. Enter your e-mail address. You will receive e-mail notification when new information is available in [address]. 9. Click Sign Up. You can now view and download portions of your medical record. 10. Click the Download Summary menu link to download a portable copy of your medical information. If you have questions, please visit the Frequently Asked Questions section of the ScriptRx website. Remember, ScriptRx is NOT to be used for urgent needs. For medical emergencies, dial 911. Now available from your iPhone and Android! Please provide this summary of care documentation to your next provider. Your primary care clinician is listed as Cami Mcgee. If you have any questions after today's visit, please call [phone].

## 2018-04-11 NOTE — PROGRESS NOTES
Patient didn't bring medications, verbally reviewed    1. Have you been to the ER, urgent care clinic since your last visit? Hospitalized since your last visit? No    2. Have you seen or consulted any other health care providers outside of the [address] [place] [address] since your last visit? Include any pap smears or colon screening. No     3. Since your last visit, have you had any of the following symptoms? NO    4. Have you had any blood work, X-rays or cardiac testing? No    5. Where do you normally have your labs drawn? PCP     6. Do you need any refills today?    No

## 2018-04-11 NOTE — PROGRESS NOTES
HISTORY OF PRESENT ILLNESS  Gino Esquivel is a 75 y.o. male. Chest Pain (Angina)    The history is provided by the patient. This is a new problem. The current episode started more than 1 week ago (3/18). The problem has been gradually improving. Duration of episode(s) is 2 minutes. The pain is associated with normal activity (walk, going to bathroom). The pain is present in the lateral region and left side. The quality of the pain is described as burning and vice-like. Radiates to: both hands. Associated symptoms include diaphoresis (mild sweating on head), dizziness, nausea, palpitations and shortness of breath. Pertinent negatives include no claudication, no cough, no fever, no headaches, no malaise/fatigue, no near-syncope, no orthopnea, no PND and no vomiting. He has tried nothing for the symptoms. Palpitations    The history is provided by the patient (jittery). The current episode started more than 1 week ago (3/18). The problem is associated with stress. Associated symptoms include diaphoresis (mild sweating on head), chest pain, nausea, dizziness and shortness of breath. Pertinent negatives include no fever, no malaise/fatigue, no claudication, no near-syncope, no orthopnea, no PND, no vomiting, no headaches and no cough. Dizziness   The history is provided by the patient. This is a new problem. The current episode started more than 1 week ago (3/18). The problem occurs every several days (had a fall in 2/18). The problem has been gradually improving. Associated symptoms include chest pain and shortness of breath. Pertinent negatives include no headaches. The symptoms are aggravated by standing and walking. The symptoms are relieved by rest.   Shortness of Breath   The history is provided by the patient (feeling tired). This is a new problem. The problem occurs intermittently. The current episode started more than 1 week ago (3/18). The problem has been gradually improving.  Associated symptoms include chest pain. Pertinent negatives include no fever, no headaches, no cough, no wheezing, no PND, no orthopnea, no vomiting, no rash, no leg swelling and no claudication. Review of Systems   Constitutional: Positive for diaphoresis (mild sweating on head). Negative for chills, fever, malaise/fatigue and weight loss. HENT: Negative for nosebleeds. Eyes: Negative for discharge. Respiratory: Positive for shortness of breath. Negative for cough and wheezing. Cardiovascular: Positive for chest pain and palpitations. Negative for orthopnea, claudication, leg swelling, PND and near-syncope. Gastrointestinal: Positive for nausea. Negative for diarrhea and vomiting. Genitourinary: Negative for dysuria and hematuria. Musculoskeletal: Negative for joint pain. Skin: Negative for rash. Neurological: Positive for dizziness. Negative for seizures, loss of consciousness and headaches. Endo/Heme/Allergies: Negative for polydipsia. Does not bruise/bleed easily. Psychiatric/Behavioral: Negative for depression and substance abuse. The patient does not have insomnia. No Known Allergies    Past Medical History:   Diagnosis Date    BPH (benign prostatic hyperplasia)     Depression     Hyperlipidemia     Hypertension     Irritability and anger     Syncope 2017    no injuries       Family History   Problem Relation Age of Onset    Diabetes Mother     Heart Disease Father     Diabetes Father     Heart Attack Neg Hx     Stroke Neg Hx     Sudden Death Neg Hx        Social History   Substance Use Topics    Smoking status: Never Smoker    Smokeless tobacco: Never Used    Alcohol use No        Current Outpatient Prescriptions   Medication Sig    diclofenac EC (VOLTAREN) 50 mg EC tablet TAKE 1 TABLET BY MOUTH TWICE DAILY AS NEEDED    hydrALAZINE (APRESOLINE) 25 mg tablet Take 1 Tab by mouth three (3) times daily.     hydrOXYzine HCl (ATARAX) 25 mg tablet Take 1 Tab by mouth every eight (8) hours as needed for Itching for up to 10 days.  amLODIPine (NORVASC) 10 mg tablet TAKE 1 TABLET BY MOUTH DAILY FOR BLOOD PRESSURE    sertraline (ZOLOFT) 50 mg tablet TAKE 1 AND 1/2 TABLETS BY MOUTH DAILY    hydroCHLOROthiazide (HYDRODIURIL) 25 mg tablet TAKE 1 TABLET BY MOUTH DAILY    diclofenac potassium (CATAFLAM) 50 mg tablet Take 50 mg by mouth three (3) times daily.  omeprazole (PRILOSEC) 20 mg capsule TAKE ONE CAPSULE BY MOUTH DAILY    losartan (COZAAR) 100 mg tablet TAKE 1 TABLET BY MOUTH DAILY FOR HIGH BLOOD PRESSURE    atorvastatin (LIPITOR) 40 mg tablet TAKE 1 TABLET BY MOUTH DAILY    codeine-butalbital-acetaminophen-caffeine (FIORICET WITH CODEINE) -89-30 mg capsule TAKE 1 CAPSULE BY MOUTH EVERY 6 HOURS AS NEEDED FOR HEADACHE OR MIGRAINE    metoprolol succinate (TOPROL-XL) 50 mg XL tablet TAKE 1 TABLET BY MOUTH DAILY    regadenoson (LEXISCAN) 0.4 mg/5 mL syrg injection 5 mL by IntraVENous route once for 1 dose. No current facility-administered medications for this visit. Past Surgical History:   Procedure Laterality Date    COLONOSCOPY N/A 7/19/2017    COLONOSCOPY / polypectomy performed by Kate Morales MD at [address] [place]    plate placed in head due to injury    MN COLSC FLX W/REMOVAL LESION BY HOT BX FORCEPS N/A 07/19/2017    Dr. Galen Payton       Visit Vitals    /84    Pulse 63    Ht 5' 5\" (1.651 m)    Wt 191 lb (86.6 kg)    BMI 31.78 kg/m2     Vitals:    04/11/18 1124   BP: 160/84   Pulse: 63   Weight: 191 lb (86.6 kg)   Height: 5' 5\" (1.651 m)         Diagnostic Studies:  I have reviewed the relevant tests done on the patient and show as follows  EKG tracings reviewed by me today. CARDIOLOGY STUDIES 4/11/2018   Pharmacological Nuclear Stress Test Result nl scan, nl EF   4/18 ECHO  SUMMARY:  Left ventricle: Systolic function was normal. Ejection fraction was  estimated in the range of 55 % to 60 %.  There were no regional wall motion  abnormalities. Right ventricle: The size was at the upper limits of normal.    Mitral valve: There was trivial regurgitation. Pulmonic valve: There was mild regurgitation. Mr. Elvis Mcginnis has a reminder for a \"due or due soon\" health maintenance. I have asked that he contact his primary care provider for follow-up on this health maintenance. Physical Exam   Constitutional: He is oriented to person, place, and time. He appears well-developed and well-nourished. No distress. HENT:   Head: Normocephalic and atraumatic. Mouth/Throat: Normal dentition. Eyes: Right eye exhibits no discharge. Left eye exhibits no discharge. No scleral icterus. Neck: Neck supple. No JVD present. Carotid bruit is not present. No thyromegaly present. Cardiovascular: Normal rate, regular rhythm, S1 normal, S2 normal, normal heart sounds and intact distal pulses. Exam reveals no gallop and no friction rub. No murmur heard. Pulmonary/Chest: Effort normal. He has no wheezes. He has rales (harsh BS in bases). Abdominal: Soft. He exhibits no mass. There is no tenderness. Musculoskeletal: He exhibits no edema. Lymphadenopathy:        Right cervical: No superficial cervical adenopathy present. Left cervical: No superficial cervical adenopathy present. Neurological: He is alert and oriented to person, place, and time. Skin: Skin is warm and dry. No rash noted. Psychiatric: He has a normal mood and affect. His behavior is normal.       ASSESSMENT and PLAN    Weight loss has been strongly encouraged by following dietary restrictions and an exercise routine. Diagnoses and all orders for this visit:    1. Dyslipidemia  Comments: On statins; lipids in jan and sept 2017 very different?lab error  f/u PCP    2. Essential hypertension  Comments:  4/18 No meds today;    3. Other chest pain  Comments:  4/18 improving, nl stress test      4.  SOB (shortness of breath) on exertion  Comments:  4/18 improving; no w/u any more as stress and echo normal    5. Obesity (BMI 30.0-34. 9)        Pertinent laboratory and test data reviewed and discussed with patient. See patient instructions also for other medical advice given    Medications Discontinued During This Encounter   Medication Reason    aspirin 81 mg chewable tablet Not A Current Medication       Follow-up Disposition:  Return in about 6 months (around 10/11/2018), or if symptoms worsen or fail to improve.

## 2018-04-11 NOTE — PROGRESS NOTES
[facility]  [address], [address] [place] [address], [place], [address] [place] [address]  [phone] [place] [phone]      Name: Gino Esquivel MRN#: [id number]      YOB: 1950     Gender: male Ht:5'5 \" Wt:190 lbs            Date of Rest/Stress Images: 4/11/2018   Referring Provider: Jose Marlow MD  Ordering Provider: Kenny Grove. Rolan Aden MD, [facility]  Technologist: Kush Arnold. Kevon KU, C.N.M.T. Diagnosis:   1. Other chest pain    2. SOB (shortness of breath) on exertion    3. Dizziness          Rest/Stress Myoview SPECT Myocardial Perfusion Imaging with  Exercise / Lexiscan Stress and Gated SPECT Imaging      PROCEDURE:      Myocardial perfusion imaging was performed at rest approximately 30 mins following the intravenous injection,(Right hand ) of 11.7 mCi of Tc99m Myoview for evaluation of myocardial function and perfusion at rest.     Baseline:   Heart rate 59. Blood pressure 144/88. EKG shows sinus bradycardia normal axis, LVH by voltage, right atrial abnormality, no acute ST-T changes. Exercise/Lexiscan data:  Patient exercised using standard Magdiel protocol. Patient exercised for a total of 5 minutes and 30 seconds achieving 7.0 METS. Exercise was stopped due to fatigue and difficulty with right leg at patient's request.  0.4 mg of Lexiscan injected over 15-20 seconds during low-level treadmill followed by Myoview injection. Max heart rate is 108 which is 70 % of predicted maximal.  Heart rate post Lexiscan is 93. Blood pressure at peak exercise is 156/80 and post Lexiscan is 144/70. EKG has no ST-T changes by standard criteria. Conclusions :  1. No ischemic ST-T changes up to 70 % of predicted maximum heart rate or after Lexiscan infusion  2. Normal functional capacity. Limited by right leg discomfort while walking along with fatigue  3.   No symptoms of chest pain or arrhythmias with exercise. 4.  Appropriate heart rate and blood pressure response. 5.  Perfusion images report to follow      Pharmacological:  Patient was injected with . 4 mg/mL with Lexiscan intravenously over a period 10 to 20 sec. After pharmacologic stress, the patient was injected intravenously with 36.1 mCi of Tc99m Myoview. Gating post stress tomographic imaging performed approximately 45 minutes post tracer injection. The data was reconstructed in the short, horizontal long and vertical long axis views and tomographic slices were generated. NUCLEAR IMAGING:     Findings:   1. Stress images reveal normal Myoview distrubution in all the LV segments in short axis, vertical and horizontal long axis views. 2. Resting images have a normal uptake. 3. Gated images reveal normal wall motion and the ejection fraction is calculated to be 64%. Conclusion:   1. Normal perfusion scan. 2. Normal wall motion and ejection fraction is calculated at 64%. 3. No evidence of significant fixed or reversible defect suggesting ischemia or myocardial infarction noted from this nuclear study. 4. Low risk scan. Thank you for the referral.    E-signed and Interpreting Physician:    Kimmy Cole.  Kary Correia MD, [facility]     Date of interpretation: 4/11/2018  Date of final report: 4/11/2018

## 2018-04-30 RX ORDER — OMEPRAZOLE 20 MG/1
CAPSULE, DELAYED RELEASE ORAL
Qty: 90 CAP | Refills: 0 | Status: SHIPPED | OUTPATIENT
Start: 2018-04-30 | End: 2018-08-06 | Stop reason: SDUPTHER

## 2018-05-26 DIAGNOSIS — I10 ESSENTIAL HYPERTENSION: ICD-10-CM

## 2018-05-26 DIAGNOSIS — F39 MOOD DISORDER (HCC): ICD-10-CM

## 2018-05-29 RX ORDER — SERTRALINE HYDROCHLORIDE 50 MG/1
TABLET, FILM COATED ORAL
Qty: 135 TAB | Refills: 0 | Status: SHIPPED | OUTPATIENT
Start: 2018-05-29 | End: 2018-05-31 | Stop reason: SDUPTHER

## 2018-05-29 RX ORDER — LOSARTAN POTASSIUM 100 MG/1
TABLET ORAL
Qty: 90 TAB | Refills: 0 | Status: SHIPPED | OUTPATIENT
Start: 2018-05-29 | End: 2018-05-31 | Stop reason: SDUPTHER

## 2018-05-31 DIAGNOSIS — F39 MOOD DISORDER (HCC): ICD-10-CM

## 2018-05-31 DIAGNOSIS — I10 ESSENTIAL HYPERTENSION: ICD-10-CM

## 2018-06-04 ENCOUNTER — OFFICE VISIT (OUTPATIENT)
Dept: FAMILY MEDICINE CLINIC | Age: 68
End: 2018-06-04

## 2018-06-04 VITALS
WEIGHT: 185 LBS | HEART RATE: 58 BPM | SYSTOLIC BLOOD PRESSURE: 161 MMHG | OXYGEN SATURATION: 98 % | HEIGHT: 65 IN | BODY MASS INDEX: 30.82 KG/M2 | TEMPERATURE: 96.1 F | RESPIRATION RATE: 18 BRPM | DIASTOLIC BLOOD PRESSURE: 89 MMHG

## 2018-06-04 DIAGNOSIS — I10 ESSENTIAL HYPERTENSION: Primary | ICD-10-CM

## 2018-06-04 DIAGNOSIS — H53.9 VISUAL CHANGES: ICD-10-CM

## 2018-06-04 DIAGNOSIS — F39 MOOD DISORDER (HCC): ICD-10-CM

## 2018-06-04 DIAGNOSIS — R51.9 NONINTRACTABLE HEADACHE, UNSPECIFIED CHRONICITY PATTERN, UNSPECIFIED HEADACHE TYPE: ICD-10-CM

## 2018-06-04 DIAGNOSIS — I10 ESSENTIAL HYPERTENSION: ICD-10-CM

## 2018-06-04 DIAGNOSIS — R21 RASH AND NONSPECIFIC SKIN ERUPTION: ICD-10-CM

## 2018-06-04 DIAGNOSIS — Z13.5 SCREENING FOR GLAUCOMA: ICD-10-CM

## 2018-06-04 RX ORDER — HYDRALAZINE HYDROCHLORIDE 50 MG/1
50 TABLET, FILM COATED ORAL 3 TIMES DAILY
Qty: 90 TAB | Refills: 0 | Status: SHIPPED | OUTPATIENT
Start: 2018-06-04 | End: 2018-06-04 | Stop reason: SDUPTHER

## 2018-06-04 RX ORDER — HYDRALAZINE HYDROCHLORIDE 25 MG/1
50 TABLET, FILM COATED ORAL 3 TIMES DAILY
Qty: 270 TAB | Refills: 2 | COMMUNITY
Start: 2018-06-04 | End: 2018-06-04 | Stop reason: ALTCHOICE

## 2018-06-04 RX ORDER — HYDRALAZINE HYDROCHLORIDE 50 MG/1
TABLET, FILM COATED ORAL
Qty: 270 TAB | Refills: 0 | Status: SHIPPED | OUTPATIENT
Start: 2018-06-04 | End: 2019-05-23 | Stop reason: SDUPTHER

## 2018-06-04 RX ORDER — SERTRALINE HYDROCHLORIDE 50 MG/1
TABLET, FILM COATED ORAL
Qty: 135 TAB | Refills: 0 | Status: SHIPPED | OUTPATIENT
Start: 2018-06-04 | End: 2018-11-09 | Stop reason: SDUPTHER

## 2018-06-04 RX ORDER — LOSARTAN POTASSIUM 100 MG/1
TABLET ORAL
Qty: 90 TAB | Refills: 0 | Status: SHIPPED | OUTPATIENT
Start: 2018-06-04 | End: 2018-10-08 | Stop reason: SDUPTHER

## 2018-06-04 RX ORDER — TRIAMCINOLONE ACETONIDE 0.25 MG/G
CREAM TOPICAL 2 TIMES DAILY
Qty: 80 G | Refills: 0 | Status: SHIPPED | OUTPATIENT
Start: 2018-06-04 | End: 2019-10-03

## 2018-06-04 NOTE — PROGRESS NOTES
Chief Complaint   Patient presents with    Follow-up     HTN,anxiety     Rash     on arms and whole body with itching x1 month      1. Have you been to the ER, urgent care clinic since your last visit? Hospitalized since your last visit? No    2. Have you seen or consulted any other health care providers outside of the [address] [place] [address] since your last visit? Include any pap smears or colon screening. No     HPI  [employer]. comes in for follow-up care. Hypertension: Patient continues to have elevated blood pressure. He has been taking hydralazine 25 mg 3 times a day. I would increase this to 50 mg 3 times a day. He is also on losartan 100 mg daily, amlodipine 10 mg daily, HCTZ 25 mg daily and metoprolol XL 50 mg daily. I will follow-up in 2 weeks. Changes in vision: Patient has been having blurry vision and the decreased visual acuity. This has been ongoing for weeks. I will refer him to the ophthalmologist for evaluation and management. He does need to get his vision checked. Headache: Patient does get headaches on and off. This has been chronic for him. We have in the past done a head CT scan that was negative for any acute intracranial abnormality. He does take Fioricet at times with good result. Also does have Cataflam that he takes as needed. Rash: Patient has rash with pruritus that is over his back and the arm. This is a dermatitis rash. We will give some triamcinolone to apply twice daily as needed. Mood disorder: Patient at times gets angry and irritated and very aggravated. Currently however he is stable. He takes sertraline daily. We will continue this medication.     Past Medical History  Past Medical History:   Diagnosis Date    BPH (benign prostatic hyperplasia)     Depression     Hyperlipidemia     Hypertension     Irritability and anger     Syncope 2017    no injuries       Surgical History  Past Surgical History:   Procedure Laterality Date    COLONOSCOPY N/A 7/19/2017    COLONOSCOPY / polypectomy performed by Alma Villalobos MD at [facility] HX OTHER SURGICAL  1993    plate placed in head due to injury    [place] COLSC FLX W/REMOVAL LESION BY HOT BX FORCEPS N/A 07/19/2017    Dr. Andrés Mason        Medications  Current Outpatient Prescriptions   Medication Sig Dispense Refill    losartan (COZAAR) 100 mg tablet TAKE 1 TABLET BY MOUTH DAILY FOR HIGH BLOOD PRESSURE 90 Tab 0    sertraline (ZOLOFT) 50 mg tablet TAKE 1 AND 1/2 TABLETS BY MOUTH DAILY 135 Tab 0    hydrALAZINE (APRESOLINE) 50 mg tablet Take 1 Tab by mouth three (3) times daily. 90 Tab 0    omeprazole (PRILOSEC) 20 mg capsule TAKE ONE CAPSULE BY MOUTH DAILY 90 Cap 0    diclofenac EC (VOLTAREN) 50 mg EC tablet TAKE 1 TABLET BY MOUTH TWICE DAILY AS NEEDED 180 Tab 0    amLODIPine (NORVASC) 10 mg tablet TAKE 1 TABLET BY MOUTH DAILY FOR BLOOD PRESSURE 90 Tab 0    hydroCHLOROthiazide (HYDRODIURIL) 25 mg tablet TAKE 1 TABLET BY MOUTH DAILY 60 Tab 0    diclofenac potassium (CATAFLAM) 50 mg tablet Take 50 mg by mouth three (3) times daily.  atorvastatin (LIPITOR) 40 mg tablet TAKE 1 TABLET BY MOUTH DAILY 90 Tab 0    codeine-butalbital-acetaminophen-caffeine (FIORICET WITH CODEINE) -70-30 mg capsule TAKE 1 CAPSULE BY MOUTH EVERY 6 HOURS AS NEEDED FOR HEADACHE OR MIGRAINE 30 Cap 0    metoprolol succinate (TOPROL-XL) 50 mg XL tablet TAKE 1 TABLET BY MOUTH DAILY 90 Tab 3       Allergies  No Known Allergies    Family History  Family History   Problem Relation Age of Onset    Diabetes Mother     Heart Disease Father     Diabetes Father     Heart Attack Neg Hx     Stroke Neg Hx     Sudden Death Neg Hx        Social History  Social History     Social History    Marital status: SINGLE     Spouse name: N/A    Number of children: N/A    Years of education: N/A     Occupational History    Not on file.      Social History Main Topics    Smoking status: Never Smoker    Smokeless tobacco: Never Used    Alcohol use No    Drug use: No    Sexual activity: Yes     Partners: Female     Other Topics Concern    Not on file     Social History Narrative       Review of Systems  Review of Systems - History obtained from chart review and the patient  General ROS: positive for  - fatigue and malaise  negative for - chills or fever  Psychological ROS: positive for - behavioral disorder, irritability and mood swings  Ophthalmic ROS: positive for - blurry vision and decreased vision  ENT ROS: negative  Allergy and Immunology ROS: negative  Hematological and Lymphatic ROS: negative  Respiratory ROS: no cough, shortness of breath, or wheezing  Cardiovascular ROS: no chest pain or dyspnea on exertion  Gastrointestinal ROS: no abdominal pain, change in bowel habits, or black or bloody stools  Genito-Urinary ROS: negative  Musculoskeletal ROS: negative  Neurological ROS: positive for - headaches  Dermatological ROS: positive for - pruritus and rash    Vital Signs  Visit Vitals    /89 (BP 1 Location: Left arm, BP Patient Position: Sitting)    Pulse (!) 58    Temp 96.1 °F (35.6 °C) (Oral)    Resp 18    Ht 5' 5\" (1.651 m)    Wt 185 lb (83.9 kg)    SpO2 98%    BMI 30.79 kg/m2         Physical Exam  Physical Examination: General appearance - oriented to person, place, and time, acyanotic, in no respiratory distress and well hydrated  Mental status - alert, oriented to person, place, and time, affect appropriate to mood  Eyes - sclera anicteric, no hypertensive retinopathy is noted  Ears - hearing grossly normal bilaterally  Nose - normal and patent, no erythema, discharge or polyps  Mouth - mucous membranes moist, pharynx normal without lesions  Neck - supple, no significant adenopathy  Lymphatics - no palpable lymphadenopathy  Chest - clear to auscultation, no wheezes, rales or rhonchi, symmetric air entry  Heart - normal rate and regular rhythm, S1 and S2 normal  Back exam - limited range of motion  Neurological - alert, oriented, normal speech, no focal findings or movement disorder noted, screening mental status exam normal, cranial nerves II through XII intact  Musculoskeletal - osteoarthritic changes noted in both hands  Extremities - no pedal edema noted, intact peripheral pulses  Skin - DERMATITIS NOTED: eczematoid dermatitis on forearms and back. Results  Results for orders placed or performed in visit on 09/27/17   CULTURE, STOOL   Result Value Ref Range    Salmonella/Shigella Screen CANCELED     Campylobacter Culture CANCELED     E coli Shiga toxin CANCELED    CULTURE, STOOL   Result Value Ref Range    Salmonella/Shigella Screen CANCELED     Campylobacter Culture CANCELED     E coli Shiga toxin CANCELED    CBC WITH AUTOMATED DIFF   Result Value Ref Range    WBC 3.1 (L) 3.4 - 10.8 x10E3/uL    RBC 4.71 4.14 - 5.80 x10E6/uL    HGB 14.0 12.6 - 17.7 g/dL    HCT 42.1 37.5 - 51.0 %    MCV 89 79 - 97 fL    MCH 29.7 26.6 - 33.0 pg    MCHC 33.3 31.5 - 35.7 g/dL    RDW 14.8 12.3 - 15.4 %    PLATELET 889 120 - 336 x10E3/uL    NEUTROPHILS 41 %    Lymphocytes 45 %    MONOCYTES 9 %    EOSINOPHILS 5 %    BASOPHILS 0 %    ABS. NEUTROPHILS 1.3 (L) 1.4 - 7.0 x10E3/uL    Abs Lymphocytes 1.4 0.7 - 3.1 x10E3/uL    ABS. MONOCYTES 0.3 0.1 - 0.9 x10E3/uL    ABS. EOSINOPHILS 0.1 0.0 - 0.4 x10E3/uL    ABS. BASOPHILS 0.0 0.0 - 0.2 x10E3/uL    IMMATURE GRANULOCYTES 0 %    ABS. IMM.  GRANS. 0.0 0.0 - 0.1 G43Y0/GO   METABOLIC PANEL, COMPREHENSIVE   Result Value Ref Range    Glucose 95 65 - 99 mg/dL    BUN 10 8 - 27 mg/dL    Creatinine 0.91 0.76 - 1.27 mg/dL    GFR est non-AA 87 >59 mL/min/1.73    GFR est  >59 mL/min/1.73    BUN/Creatinine ratio 11 10 - 24    Sodium 142 134 - 144 mmol/L    Potassium 3.8 3.5 - 5.2 mmol/L    Chloride 98 96 - 106 mmol/L    CO2 25 18 - 29 mmol/L    Calcium 9.4 8.6 - 10.2 mg/dL    Protein, total 7.8 6.0 - 8.5 g/dL    Albumin 4.5 3.6 - 4.8 g/dL    GLOBULIN, TOTAL 3.3 1.5 - 4.5 g/dL    A-G Ratio 1.4 1.2 - 2.2    Bilirubin, total 0.4 0.0 - 1.2 mg/dL    Alk. phosphatase 106 39 - 117 IU/L    AST (SGOT) 26 0 - 40 IU/L    ALT (SGPT) 23 0 - 44 IU/L   LIPID PANEL   Result Value Ref Range    Cholesterol, total 123 100 - 199 mg/dL    Triglyceride 212 (H) 0 - 149 mg/dL    HDL Cholesterol 42 >39 mg/dL    VLDL, calculated 42 (H) 5 - 40 mg/dL    LDL, calculated 39 0 - 99 mg/dL   CVD REPORT   Result Value Ref Range    INTERPRETATION Note    PLEASE NOTE   Result Value Ref Range    Please note Comment    PLEASE NOTE   Result Value Ref Range    Please note Comment        ASSESSMENT and PLAN    ICD-10-CM ICD-9-CM    1. Essential hypertension I10 401.9 hydrALAZINE (APRESOLINE) 50 mg tablet      DISCONTINUED: hydrALAZINE (APRESOLINE) 25 mg tablet   2. Visual changes H53.9 368.9 REFERRAL TO OPHTHALMOLOGY   3. Screening for glaucoma Z13.5 V80.1 REFERRAL TO OPHTHALMOLOGY   4. Rash and nonspecific skin eruption R21 782.1 triamcinolone acetonide (KENALOG) 0.025 % topical cream   5. Mood disorder (HCC) F39 296.90    6. Nonintractable headache, unspecified chronicity pattern, unspecified headache type R51 784.0      lab results and schedule of future lab studies reviewed with patient  reviewed diet, exercise and weight control  cardiovascular risk and specific lipid/LDL goals reviewed  reviewed medications and side effects in detail    I have discussed the diagnosis with the patient and the intended plan of care as seen in the above orders. The patient has received an after-visit summary and questions were answered concerning future plans. I have discussed medication, side effects, and warnings with the patient in detail. The patient verbalized understanding and is in agreement with the plan of care. The patient will contact the office with any additional concerns.     Viky Rois MD

## 2018-08-06 DIAGNOSIS — M25.561 CHRONIC PAIN OF RIGHT KNEE: ICD-10-CM

## 2018-08-06 DIAGNOSIS — G89.29 CHRONIC PAIN OF RIGHT KNEE: ICD-10-CM

## 2018-08-07 RX ORDER — OMEPRAZOLE 20 MG/1
CAPSULE, DELAYED RELEASE ORAL
Qty: 90 CAP | Refills: 0 | Status: SHIPPED | OUTPATIENT
Start: 2018-08-07 | End: 2018-11-09 | Stop reason: SDUPTHER

## 2018-08-07 RX ORDER — DICLOFENAC SODIUM 50 MG/1
TABLET, DELAYED RELEASE ORAL
Qty: 180 TAB | Refills: 0 | Status: SHIPPED | OUTPATIENT
Start: 2018-08-07 | End: 2019-10-03

## 2018-08-07 RX ORDER — AMLODIPINE BESYLATE 10 MG/1
TABLET ORAL
Qty: 90 TAB | Refills: 0 | Status: SHIPPED | OUTPATIENT
Start: 2018-08-07 | End: 2018-11-21 | Stop reason: SDUPTHER

## 2018-08-30 DIAGNOSIS — F39 MOOD DISORDER (HCC): ICD-10-CM

## 2018-08-31 RX ORDER — SERTRALINE HYDROCHLORIDE 50 MG/1
TABLET, FILM COATED ORAL
Qty: 135 TAB | Refills: 0 | Status: SHIPPED | OUTPATIENT
Start: 2018-08-31 | End: 2020-02-06 | Stop reason: SDUPTHER

## 2018-08-31 RX ORDER — SERTRALINE HYDROCHLORIDE 50 MG/1
TABLET, FILM COATED ORAL
Qty: 135 TAB | Refills: 0 | Status: SHIPPED | OUTPATIENT
Start: 2018-08-31 | End: 2018-11-09 | Stop reason: SDUPTHER

## 2018-10-08 DIAGNOSIS — I10 ESSENTIAL HYPERTENSION: ICD-10-CM

## 2018-10-08 RX ORDER — LOSARTAN POTASSIUM 100 MG/1
TABLET ORAL
Qty: 90 TAB | Refills: 0 | Status: SHIPPED | OUTPATIENT
Start: 2018-10-08 | End: 2019-02-25

## 2018-11-09 DIAGNOSIS — F39 MOOD DISORDER (HCC): ICD-10-CM

## 2018-11-09 RX ORDER — SERTRALINE HYDROCHLORIDE 50 MG/1
TABLET, FILM COATED ORAL
Qty: 135 TAB | Refills: 0 | Status: SHIPPED | OUTPATIENT
Start: 2018-11-09 | End: 2019-01-30 | Stop reason: SDUPTHER

## 2018-11-09 RX ORDER — OMEPRAZOLE 20 MG/1
CAPSULE, DELAYED RELEASE ORAL
Qty: 90 CAP | Refills: 0 | Status: SHIPPED | OUTPATIENT
Start: 2018-11-09 | End: 2020-10-27 | Stop reason: ALTCHOICE

## 2018-11-21 DIAGNOSIS — I10 ESSENTIAL HYPERTENSION: ICD-10-CM

## 2018-11-26 RX ORDER — LOSARTAN POTASSIUM 100 MG/1
TABLET ORAL
Qty: 90 TAB | Refills: 0 | Status: SHIPPED | OUTPATIENT
Start: 2018-11-26 | End: 2019-06-24 | Stop reason: SDUPTHER

## 2018-11-26 RX ORDER — AMLODIPINE BESYLATE 10 MG/1
TABLET ORAL
Qty: 90 TAB | Refills: 0 | Status: SHIPPED | OUTPATIENT
Start: 2018-11-26 | End: 2019-05-26 | Stop reason: SDUPTHER

## 2019-01-30 DIAGNOSIS — F39 MOOD DISORDER (HCC): ICD-10-CM

## 2019-01-30 DIAGNOSIS — I10 ESSENTIAL HYPERTENSION: ICD-10-CM

## 2019-01-30 RX ORDER — HYDROCHLOROTHIAZIDE 25 MG/1
TABLET ORAL
Qty: 60 TAB | Refills: 0 | Status: SHIPPED | OUTPATIENT
Start: 2019-01-30 | End: 2020-10-06 | Stop reason: SDUPTHER

## 2019-01-30 RX ORDER — SERTRALINE HYDROCHLORIDE 50 MG/1
TABLET, FILM COATED ORAL
Qty: 135 TAB | Refills: 0 | Status: SHIPPED | OUTPATIENT
Start: 2019-01-30 | End: 2019-02-25

## 2019-02-25 ENCOUNTER — OFFICE VISIT (OUTPATIENT)
Dept: FAMILY MEDICINE CLINIC | Age: 69
End: 2019-02-25

## 2019-02-25 VITALS
DIASTOLIC BLOOD PRESSURE: 78 MMHG | HEART RATE: 86 BPM | HEIGHT: 65 IN | OXYGEN SATURATION: 98 % | SYSTOLIC BLOOD PRESSURE: 127 MMHG | WEIGHT: 185 LBS | BODY MASS INDEX: 30.82 KG/M2 | TEMPERATURE: 97.5 F | RESPIRATION RATE: 15 BRPM

## 2019-02-25 DIAGNOSIS — R07.9 CHEST PAIN, UNSPECIFIED TYPE: Primary | ICD-10-CM

## 2019-02-25 DIAGNOSIS — Z13.5 SCREENING FOR GLAUCOMA: ICD-10-CM

## 2019-02-25 DIAGNOSIS — Z12.5 SCREENING FOR MALIGNANT NEOPLASM OF PROSTATE: ICD-10-CM

## 2019-02-25 DIAGNOSIS — Z00.00 ENCOUNTER FOR MEDICARE ANNUAL WELLNESS EXAM: ICD-10-CM

## 2019-02-25 DIAGNOSIS — R51.9 NONINTRACTABLE HEADACHE, UNSPECIFIED CHRONICITY PATTERN, UNSPECIFIED HEADACHE TYPE: ICD-10-CM

## 2019-02-25 DIAGNOSIS — I10 ESSENTIAL HYPERTENSION: ICD-10-CM

## 2019-02-25 DIAGNOSIS — E78.5 DYSLIPIDEMIA: ICD-10-CM

## 2019-02-25 DIAGNOSIS — Z71.89 ACP (ADVANCE CARE PLANNING): ICD-10-CM

## 2019-02-25 NOTE — PROGRESS NOTES
Chief Complaint   Patient presents with   [facility]         Medication Refill     1. Have you been to the ER, urgent care clinic since your last visit? Hospitalized since your last visit? No    2. Have you seen or consulted any other health care providers outside of the [address] [place] [address] since your last visit? Include any pap smears or colon screening. No  This is the Subsequent Medicare Annual Wellness Exam, performed 12 months or more after the Initial AWV or the last Subsequent AWV    I have reviewed the patient's medical history in detail and updated the computerized patient record.      History   [employer] comes in for Medicare wellness exam.    Past Medical History:   Diagnosis Date    BPH (benign prostatic hyperplasia)     Depression     Hyperlipidemia     Hypertension     Irritability and anger     Syncope 2017    no injuries      Past Surgical History:   Procedure Laterality Date    COLONOSCOPY N/A 7/19/2017    COLONOSCOPY / polypectomy performed by Ashley Kelly MD at [address]    plate placed in head due to injury    [place] COLSC FLX W/REMOVAL LESION BY HOT BX FORCEPS N/A 07/19/2017    Dr. Dawson Mccarthy     Current Outpatient Medications   Medication Sig Dispense Refill    hydroCHLOROthiazide (HYDRODIURIL) 25 mg tablet TAKE 1 TABLET BY MOUTH DAILY 60 Tab 0    sertraline (ZOLOFT) 50 mg tablet TAKE 1 AND 1/2 TABLETS BY MOUTH DAILY 135 Tab 0    losartan (COZAAR) 100 mg tablet TAKE 1 TABLET BY MOUTH DAILY FOR HIGH BLOOD PRESSURE 90 Tab 0    amLODIPine (NORVASC) 10 mg tablet TAKE 1 TABLET BY MOUTH DAILY FOR BLOOD PRESSURE 90 Tab 0    omeprazole (PRILOSEC) 20 mg capsule TAKE ONE CAPSULE BY MOUTH DAILY 90 Cap 0    losartan (COZAAR) 100 mg tablet TAKE 1 TABLET BY MOUTH DAILY FOR HIGH BLOOD PRESSURE 90 Tab 0    sertraline (ZOLOFT) 50 mg tablet TAKE 1 AND 1/2 TABLETS BY MOUTH DAILY 135 Tab 0    diclofenac EC (VOLTAREN) 50 mg EC tablet TAKE 1 TABLET BY MOUTH TWICE DAILY AS NEEDED 180 Tab 0    triamcinolone acetonide (KENALOG) 0.025 % topical cream Apply  to affected area two (2) times a day. use thin layer 80 g 0    hydrALAZINE (APRESOLINE) 50 mg tablet TAKE 1 TABLET BY MOUTH THREE TIMES DAILY 270 Tab 0    atorvastatin (LIPITOR) 40 mg tablet TAKE 1 TABLET BY MOUTH DAILY 90 Tab 0    codeine-butalbital-acetaminophen-caffeine (FIORICET WITH CODEINE) -05-30 mg capsule TAKE 1 CAPSULE BY MOUTH EVERY 6 HOURS AS NEEDED FOR HEADACHE OR MIGRAINE 30 Cap 0    metoprolol succinate (TOPROL-XL) 50 mg XL tablet TAKE 1 TABLET BY MOUTH DAILY 90 Tab 3     No Known Allergies  Family History   Problem Relation Age of Onset    Diabetes Mother     Heart Disease Father     Diabetes Father     Heart Attack Neg Hx     Stroke Neg Hx     Sudden Death Neg Hx      Social History     Tobacco Use    Smoking status: Never Smoker    Smokeless tobacco: Never Used   Substance Use Topics    Alcohol use: No     Patient Active Problem List   Diagnosis Code    Mood disorder (HCC) F39    ACP (advance care planning) Z71.89    Essential hypertension I10    Epistaxis R04.0    Headache R51    Lower urinary tract symptoms (LUTS) R39.9    Dyslipidemia E78.5    Chronic pain of right knee M25.561, G89.29    Other chest pain R07.89    Dizziness R42    SOB (shortness of breath) on exertion R06.02    Obesity (BMI 30.0-34. 9) E66.9       Depression Risk Factor Screening:     3 most recent PHQ Screens 2/20/2018   PHQ Not Done -   Little interest or pleasure in doing things Not at all   Feeling down, depressed, irritable, or hopeless Not at all   Total Score PHQ 2 0     Alcohol Risk Factor Screening: You do not drink alcohol or very rarely. Functional Ability and Level of Safety:   Hearing Loss  Hearing is good. Activities of Daily Living  The home contains: no safety equipment.   Patient does total self care    Fall Risk  Fall Risk Assessment, last 12 mths 2/25/2019   Able to walk? Yes   Fall in past 12 months? No   Fall with injury? -   Number of falls in past 12 months -   Fall Risk Score -       Abuse Screen  Patient is not abused    Cognitive Screening   Evaluation of Cognitive Function:  Has your family/caregiver stated any concerns about your memory: no  Normal    Patient Care Team   Patient Care Team:  Carmella Charles MD as PCP - General (Family Practice)  Eze Mcclure MD as Physician (Colon and Rectal Surgery)  Mercedes Esposito MD (Orthopedic Surgery)    Assessment/Plan   Education and counseling provided:  Are appropriate based on today's review and evaluation  Bone mass measurement (DEXA)  Screening for glaucoma    1. Encounter for Medicare annual wellness exam    2. ACP (advance care planning)    3. Screening for malignant neoplasm of prostate  - PSA SCREENING (SCREENING); Future    4. Screening for glaucoma  - REFERRAL TO OPHTHALMOLOGY    Health Maintenance Due   Topic Date Due    Shingrix Vaccine Age 49> (1 of 2) 12/12/1999    GLAUCOMA SCREENING Q2Y  12/12/2014    Influenza Age 9 to Adult  08/01/2018    MEDICARE YEARLY EXAM  02/21/2019     I have discussed the diagnosis with the patient and the intended plan of care as seen in the above orders. The patient has received an after-visit summary and questions were answered concerning future plans. I have discussed medication, side effects, and warnings with the patient in detail. The patient verbalized understanding and is in agreement with the plan of care. The patient will contact the office with any additional concerns. Personalized preventative plan of care was discussed, printed and given to patient.     Noelle John MD

## 2019-02-25 NOTE — ACP (ADVANCE CARE PLANNING)
Advance Care Planning (ACP) Provider Note - Comprehensive     Date of ACP Conversation: 02/25/19  Persons included in Conversation:  patient  Length of ACP Conversation in minutes:  16 minutes    Authorized Decision Maker (if patient is incapable of making informed decisions): This person is:  Patient's wife would be authorize decision-maker. He would like everything attempted at the moment. He will fill out forms given and bring these back for scanning into the EMR chart. General ACP for ALL Patients with Decision Making Capacity:   Importance of advance care planning, including choosing a healthcare agent to communicate patient's healthcare decisions if patient lost the ability to make decisions, such as after a sudden illness or accident  Understanding of the healthcare agent role was assessed and information provided  Exploration of values, goals, and preferences if recovery is not expected, even with continued medical treatment in the event of: Imminent death  Severe, permanent brain injury  \"In these circumstances, what matters most to you? \"  Care focused more on comfort or quality of life.   Opportunity offered to explore how cultural, Ca[employer]ism, spiritual, or personal beliefs would affect decisions for future care     Interventions Provided:  Recommended completion of Advance Directive form after review of ACP materials and conversation with prospective healthcare agent      Cami Antonio MD

## 2019-02-25 NOTE — PROGRESS NOTES
HPI  [employer]. comes in for f/u care. Patient comes in for follow-up care. He has been having chest pain. This is left-sided. Has noticed this twice with activity. He did have some shortness of breath but no diaphoresis. At times it also sounds like a burning sensation. He denies any heartburn or reflux symptoms. The pain was relieved with rest.  No cough, wheeze or hemoptysis. No fever or chills. Will do EKG today. Patient also has hypertension. Blood pressure today stable. He denies changes in vision or focal weakness. He is on hydralazine, HCTZ, amlodipine, metoprolol and losartan. We will continue with the current treatment plan. I will check labs today. Patient has history of migraine type headaches. These have been stable. Does get an occasional headache on and off but takes Fioricet once the headache begins and this does help control the symptoms. Patient has mood disorder. He is on Zoloft. Stable on this medication. Patient has a BMI of 30.79. We discussed obesity. He will do lifestyle and dietary modification in an effort to bring down his weight.     Past Medical History  Past Medical History:   Diagnosis Date    BPH (benign prostatic hyperplasia)     Depression     Hyperlipidemia     Hypertension     Irritability and anger     Syncope 2017    no injuries       Surgical History  Past Surgical History:   Procedure Laterality Date    COLONOSCOPY N/A 7/19/2017    COLONOSCOPY / polypectomy performed by Petra Gupta MD at [facility]    HX OTHER SURGICAL  1993    plate placed in head due to injury    MS COLSC FLX W/REMOVAL LESION BY HOT BX FORCEPS N/A 07/19/2017    Dr. Benoit Big        Medications  Current Outpatient Medications   Medication Sig Dispense Refill    hydroCHLOROthiazide (HYDRODIURIL) 25 mg tablet TAKE 1 TABLET BY MOUTH DAILY 60 Tab 0    losartan (COZAAR) 100 mg tablet TAKE 1 TABLET BY MOUTH DAILY FOR HIGH BLOOD PRESSURE 90 Tab 0    amLODIPine (NORVASC) 10 mg tablet TAKE 1 TABLET BY MOUTH DAILY FOR BLOOD PRESSURE 90 Tab 0    omeprazole (PRILOSEC) 20 mg capsule TAKE ONE CAPSULE BY MOUTH DAILY 90 Cap 0    sertraline (ZOLOFT) 50 mg tablet TAKE 1 AND 1/2 TABLETS BY MOUTH DAILY 135 Tab 0    diclofenac EC (VOLTAREN) 50 mg EC tablet TAKE 1 TABLET BY MOUTH TWICE DAILY AS NEEDED 180 Tab 0    triamcinolone acetonide (KENALOG) 0.025 % topical cream Apply  to affected area two (2) times a day.  use thin layer 80 g 0    hydrALAZINE (APRESOLINE) 50 mg tablet TAKE 1 TABLET BY MOUTH THREE TIMES DAILY 270 Tab 0    atorvastatin (LIPITOR) 40 mg tablet TAKE 1 TABLET BY MOUTH DAILY 90 Tab 0    codeine-butalbital-acetaminophen-caffeine (FIORICET WITH CODEINE) -03-30 mg capsule TAKE 1 CAPSULE BY MOUTH EVERY 6 HOURS AS NEEDED FOR HEADACHE OR MIGRAINE 30 Cap 0    metoprolol succinate (TOPROL-XL) 50 mg XL tablet TAKE 1 TABLET BY MOUTH DAILY 90 Tab 3       Allergies  No Known Allergies    Family History  Family History   Problem Relation Age of Onset    Diabetes Mother     Heart Disease Father     Diabetes Father     Heart Attack Neg Hx     Stroke Neg Hx     Sudden Death Neg Hx        Social History  Social History     Socioeconomic History    Marital status: SINGLE     Spouse name: Not on file    Number of children: Not on file    Years of education: Not on file    Highest education level: Not on file   Social Needs    Financial resource strain: Not on file    Food insecurity - worry: Not on file    Food insecurity - inability: Not on file   [employer] needs - medical: Not on file   [employer] needs - non-medical: Not on file   Occupational History    Not on file   Tobacco Use    Smoking status: Never Smoker    Smokeless tobacco: Never Used   Substance and Sexual Activity    Alcohol use: No    Drug use: No    Sexual activity: Yes     Partners: Female   Other Topics Concern    Not on file   Social History Narrative    Not on file Review of Systems  Review of Systems -review of all systems is negative except as noted above in the HPI.     Vital Signs  Visit Vitals  /78 (BP 1 Location: Left arm, BP Patient Position: Sitting)   Pulse 86   Temp 97.5 °F (36.4 °C) (Oral)   Resp 15   Ht 5' 5\" (1.651 m)   Wt 185 lb (83.9 kg)   SpO2 98%   BMI 30.79 kg/m²     Physical Exam  Physical Examination: General appearance - alert, well appearing, and in no distress, oriented to person, place, and time, overweight, acyanotic, in no respiratory distress and well hydrated  Mental status - alert, oriented to person, place, and time, affect appropriate to mood  Eyes - sclera anicteric  Nose - normal and patent, no erythema, discharge or polyps  Mouth - mucous membranes moist, pharynx normal without lesions  Neck - supple, no significant adenopathy  Lymphatics - no palpable lymphadenopathy  Chest - clear to auscultation, no wheezes, rales or rhonchi, symmetric air entry  Heart - normal rate and regular rhythm, S1 and S2 normal  Abdomen - soft, nontender, nondistended, no masses or organomegaly  no rebound tenderness noted  Back exam - limited range of motion  Neurological - motor and sensory grossly normal bilaterally  Musculoskeletal - no muscular tenderness noted, osteoarthritic changes noted in both hands  Extremities - no pedal edema noted, intact peripheral pulses    Results  Results for orders placed or performed in visit on 09/27/17   CULTURE, STOOL   Result Value Ref Range    Salmonella/Shigella Screen CANCELED     Campylobacter Culture CANCELED     E coli Shiga toxin CANCELED    CULTURE, STOOL   Result Value Ref Range    Salmonella/Shigella Screen CANCELED     Campylobacter Culture CANCELED     E coli Shiga toxin CANCELED    CBC WITH AUTOMATED DIFF   Result Value Ref Range    WBC 3.1 (L) 3.4 - 10.8 x10E3/uL    RBC 4.71 4.14 - 5.80 x10E6/uL    HGB 14.0 12.6 - 17.7 g/dL    HCT 42.1 37.5 - 51.0 %    MCV 89 79 - 97 fL    MCH 29.7 26.6 - 33.0 pg MCHC 33.3 31.5 - 35.7 g/dL    RDW 14.8 12.3 - 15.4 %    PLATELET 117 576 - 657 x10E3/uL    NEUTROPHILS 41 %    Lymphocytes 45 %    MONOCYTES 9 %    EOSINOPHILS 5 %    BASOPHILS 0 %    ABS. NEUTROPHILS 1.3 (L) 1.4 - 7.0 x10E3/uL    Abs Lymphocytes 1.4 0.7 - 3.1 x10E3/uL    ABS. MONOCYTES 0.3 0.1 - 0.9 x10E3/uL    ABS. EOSINOPHILS 0.1 0.0 - 0.4 x10E3/uL    ABS. BASOPHILS 0.0 0.0 - 0.2 x10E3/uL    IMMATURE GRANULOCYTES 0 %    ABS. IMM. GRANS. 0.0 0.0 - 0.1 [id number]   METABOLIC PANEL, COMPREHENSIVE   Result Value Ref Range    Glucose 95 65 - 99 mg/dL    BUN 10 8 - 27 mg/dL    Creatinine 0.91 0.76 - 1.27 mg/dL    GFR est non-AA 87 >59 mL/min/1.73    GFR est  >59 mL/min/1.73    BUN/Creatinine ratio 11 10 - 24    Sodium 142 134 - 144 mmol/L    Potassium 3.8 3.5 - 5.2 mmol/L    Chloride 98 96 - 106 mmol/L    CO2 25 18 - 29 mmol/L    Calcium 9.4 8.6 - 10.2 mg/dL    Protein, total 7.8 6.0 - 8.5 g/dL    Albumin 4.5 3.6 - 4.8 g/dL    GLOBULIN, TOTAL 3.3 1.5 - 4.5 g/dL    A-G Ratio 1.4 1.2 - 2.2    Bilirubin, total 0.4 0.0 - 1.2 mg/dL    Alk. phosphatase 106 39 - 117 IU/L    AST (SGOT) 26 0 - 40 IU/L    ALT (SGPT) 23 0 - 44 IU/L   LIPID PANEL   Result Value Ref Range    Cholesterol, total 123 100 - 199 mg/dL    Triglyceride 212 (H) 0 - 149 mg/dL    HDL Cholesterol 42 >39 mg/dL    VLDL, calculated 42 (H) 5 - 40 mg/dL    LDL, calculated 39 0 - 99 mg/dL   CVD REPORT   Result Value Ref Range    INTERPRETATION Note    PLEASE NOTE   Result Value Ref Range    Please note Comment    PLEASE NOTE   Result Value Ref Range    Please note Comment        ASSESSMENT and PLAN    ICD-10-CM ICD-9-CM    1. Chest pain, unspecified type R07.9 786.50 AMB POC EKG ROUTINE W/ 12 LEADS, INTER & REP      REFERRAL TO CARDIOLOGY   2. Essential hypertension I10 401.9 CBC WITH AUTOMATED DIFF      METABOLIC PANEL, COMPREHENSIVE   3. Nonintractable headache, unspecified chronicity pattern, unspecified headache type R51 784.0    4.  Dyslipidemia E78.5 272.4 LIPID PANEL   5. Screening for malignant neoplasm of prostate Z12.5 V76.44 PSA SCREENING (SCREENING)   6. Screening for glaucoma Z13.5 V80.1 REFERRAL TO OPHTHALMOLOGY     lab results and schedule of future lab studies reviewed with patient  reviewed diet, exercise and weight control  reviewed medications and side effects in detail  Discussed the patient's BMI with him. The BMI follow up plan is as follows:   dietary management education, guidance, and counseling  encourage exercise  monitor weight    I have discussed the diagnosis with the patient and the intended plan of care as seen in the above orders. The patient has received an after-visit summary and questions were answered concerning future plans. I have discussed medication, side effects, and warnings with the patient in detail. The patient verbalized understanding and is in agreement with the plan of care. The patient will contact the office with any additional concerns.     Jona Irwin MD

## 2019-02-25 NOTE — PATIENT INSTRUCTIONS
Medicare Part B Preventive Services Limitations Recommendation Scheduled   Bone Mass Measurement  (age 72 & older, biennial) Requires diagnosis related to osteoporosis or estrogen deficiency. Biennial benefit unless patient has history of long-term glucocorticoid tx or baseline is needed because initial test was by other method Due    Cardiovascular Screening Blood Tests (every 5 years)  Total cholesterol, HDL, Triglycerides and ECG Order blood work  as a panel if possible and  for adults with routine risk  an electrocardiogram (ECG) at intervals determined by the provider. Colorectal Cancer Screening  -Fecal occult blood test (annual)  -Flexible sigmoidoscopy (5y)  -Screening colonoscopy (10y)  -Barium Enema Colorectal cancer screening should be done for adults age 54-65 with no increased risk factors for colorectal cancer. There are a number of acceptable methods of screening for this type of cancer. Each test has its own benefits and drawbacks. Discuss with your provider what is most appropriate for you during your annual wellness visit.  The different tests include: colonoscopy (considered the best screening method), a fecal occult blood test, a fecal DNA test, and sigmoidoscopy Done per patient, 2018    Counseling to Prevent Tobacco Use (up to 8 sessions per year)  - Counseling greater than 3 and up to 10 minutes  - Counseling greater than 10 minutes Patients must be asymptomatic of tobacco-related conditions to receive as preventive service     Diabetes Screening Tests (at least every 3 years, Medicare covers annually or at 6-month intervals for prediabetic patients)    Fasting blood sugar (FBS) or glucose tolerance test (GTT) All adults age 38-68 who are overweight should have a diabetes screening test once every three years.   -Other screening tests & preventive services for persons with diabetes include: an eye exam to screen for diabetic retinopathy, a kidney function test, a foot exam, and stricter control over your cholesterol. Diabetes Self-Management Training (DSMT) (no USPSTF recommendation) Requires referral by treating physician for patient with diabetes or renal disease. 10 hours of initial DSMT session of no less than 30 minutes each in a continuous 12-month period. 2 hours of follow-up DSMT in subsequent years. Glaucoma Screening (no USPSTF recommendation) Diabetes mellitus, family history, , age 48 or over,  American, age 72 or over Due    Human Immunodeficiency Virus (HIV) Screening (annually for increased risk patients)  HIV-1 and HIV-2 by EIA, MERYL, rapid antibody test, or oral mucosa transudate Patient must be at increased risk for HIV infection per USPSTF guidelines or pregnant. Tests covered annually for patients at increased risk. Pregnant patients may receive up to 3 test during pregnancy. Medical Nutrition Therapy (MNT) (for diabetes or renal disease not recommended schedule) Requires referral by treating physician for patient with diabetes or renal disease. Can be provided in same year as diabetes self-management training (DSMT), and CMS recommends medical nutrition therapy take place after DSMT. Up to 3 hours for initial year and 2 hours in subsequent years. Prostate Cancer Screening (annually up to age 76)  - Digital rectal exam (SHAKIRA)  - Prostate specific antigen (PSA) Annually (age 48 or over), SHAKIRA not paid separately when covered E/M service is provided on same date  Men up to age 76 may need a screening blood test for prostate cancer at certain intervals, depending on their personal and family history. This decision is between the patient and his provider. Seasonal Influenza Vaccination (annually) All adults should have a flu vaccine yearly  Done per patient at [place]      Pneumococcal Vaccination (once after 72) All adults  over age 72 should receive the recommended pneumonia vaccines.  Current USPSTF guidelines recommend a series of two vaccines for the best pneumonia protection. Due    Hepatitis B Vaccinations (if medium/high risk) Medium/high risk factors:  End-stage renal disease,  Hemophiliacs who received Factor VIII or IX concentrates, Clients of institutions for the mentally retarded, Persons who live in the same house as a HepB virus carrier, Homosexual men, Illicit injectable drug abusers. Shingles Vaccination A shingles vaccine is also recommended once in a lifetime after age 61 Done per patient    Ultrasound Screening for Abdominal Aortic Aneurysm (AAA) (once) An Abdominal Aortic Aneurysm (AAA) Screening is recommended for men age 73-68 who has ever smoked in their lifetime. of the following criteria:  - Men who are 73-68 years old and have smoked more than 100 cigarettes in their lifetime.  -Anyone with a FH of AAA  -Anyone recommended for screening by USPSTF       Hep C All adults born between [facility] should be screened once for Hepatitis C     Tetanus  All adults should have a tetanus vaccine every 10 years Done per patient. Medicare Part B Preventive Services Limitations Recommendation Scheduled   Bone Mass Measurement  (age 72 & older, biennial) Requires diagnosis related to osteoporosis or estrogen deficiency. Biennial benefit unless patient has history of long-term glucocorticoid tx or baseline is needed because initial test was by other method Due    Cardiovascular Screening Blood Tests (every 5 years)  Total cholesterol, HDL, Triglycerides and ECG Order blood work  as a panel if possible and  for adults with routine risk  an electrocardiogram (ECG) at intervals determined by the provider. Colorectal Cancer Screening  -Fecal occult blood test (annual)  -Flexible sigmoidoscopy (5y)  -Screening colonoscopy (10y)  -Barium Enema Colorectal cancer screening should be done for adults age 54-65 with no increased risk factors for colorectal cancer.   There are a number of acceptable methods of screening for this type of cancer. Each test has its own benefits and drawbacks. Discuss with your provider what is most appropriate for you during your annual wellness visit. The different tests include: colonoscopy (considered the best screening method), a fecal occult blood test, a fecal DNA test, and sigmoidoscopy Done per patient, 2018    Counseling to Prevent Tobacco Use (up to 8 sessions per year)  - Counseling greater than 3 and up to 10 minutes  - Counseling greater than 10 minutes Patients must be asymptomatic of tobacco-related conditions to receive as preventive service     Diabetes Screening Tests (at least every 3 years, Medicare covers annually or at 6-month intervals for prediabetic patients)    Fasting blood sugar (FBS) or glucose tolerance test (GTT) All adults age 38-68 who are overweight should have a diabetes screening test once every three years.   -Other screening tests & preventive services for persons with diabetes include: an eye exam to screen for diabetic retinopathy, a kidney function test, a foot exam, and stricter control over your cholesterol. Diabetes Self-Management Training (DSMT) (no USPSTF recommendation) Requires referral by treating physician for patient with diabetes or renal disease. 10 hours of initial DSMT session of no less than 30 minutes each in a continuous 12-month period. 2 hours of follow-up DSMT in subsequent years. Glaucoma Screening (no USPSTF recommendation) Diabetes mellitus, family history, , age 48 or over,  American, age 72 or over Due    Human Immunodeficiency Virus (HIV) Screening (annually for increased risk patients)  HIV-1 and HIV-2 by EIA, MERYL, rapid antibody test, or oral mucosa transudate Patient must be at increased risk for HIV infection per USPSTF guidelines or pregnant. Tests covered annually for patients at increased risk. Pregnant patients may receive up to 3 test during pregnancy.      Medical Nutrition Therapy (MNT) (for diabetes or renal disease not recommended schedule) Requires referral by treating physician for patient with diabetes or renal disease. Can be provided in same year as diabetes self-management training (DSMT), and CMS recommends medical nutrition therapy take place after DSMT. Up to 3 hours for initial year and 2 hours in subsequent years. Prostate Cancer Screening (annually up to age 76)  - Digital rectal exam (SHAKIRA)  - Prostate specific antigen (PSA) Annually (age 48 or over), SHAKIRA not paid separately when covered E/M service is provided on same date  Men up to age 76 may need a screening blood test for prostate cancer at certain intervals, depending on their personal and family history. This decision is between the patient and his provider. Done per patient, 2017    Seasonal Influenza Vaccination (annually) All adults should have a flu vaccine yearly  Done per patient, [employer] 2018      Pneumococcal Vaccination (once after 72) All adults  over age 72 should receive the recommended pneumonia vaccines. Current USPSTF guidelines recommend a series of two vaccines for the best pneumonia protection. Done per patient. Hepatitis B Vaccinations (if medium/high risk) Medium/high risk factors:  End-stage renal disease,  Hemophiliacs who received Factor VIII or IX concentrates, Clients of institutions for the mentally retarded, Persons who live in the same house as a HepB virus carrier, Homosexual men, Illicit injectable drug abusers. Shingles Vaccination A shingles vaccine is also recommended once in a lifetime after age 61 Done per patient    Ultrasound Screening for Abdominal Aortic Aneurysm (AAA) (once) An Abdominal Aortic Aneurysm (AAA) Screening is recommended for men age 73-68 who has ever smoked in their lifetime.   of the following criteria:  - Men who are 73-68 years old and have smoked more than 100 cigarettes in their lifetime.  -Anyone with a FH of AAA  -Anyone recommended for screening by USPSTF Hep C All adults born between [birth year] and 1965 should be screened once for Hepatitis C Unsure    Tetanus  All adults should have a tetanus vaccine every 10 years Done per patient. Body Mass Index: Care Instructions  Your Care Instructions    Body mass index (BMI) can help you see if your weight is raising your risk for health problems. It uses a formula to compare how much you weigh with how tall you are. · A BMI lower than 18.5 is considered underweight. · A BMI between 18.5 and 24.9 is considered healthy. · A BMI between 25 and 29.9 is considered overweight. A BMI of 30 or higher is considered obese. If your BMI is in the normal range, it means that you have a lower risk for weight-related health problems. If your BMI is in the overweight or obese range, you may be at increased risk for weight-related health problems, such as high blood pressure, heart disease, stroke, arthritis or joint pain, and diabetes. If your BMI is in the underweight range, you may be at increased risk for health problems such as fatigue, lower protection (immunity) against illness, muscle loss, bone loss, hair loss, and hormone problems. BMI is just one measure of your risk for weight-related health problems. You may be at higher risk for health problems if you are not active, you eat an unhealthy diet, or you drink too much alcohol or use tobacco products. Follow-up care is a key part of your treatment and safety. Be sure to make and go to all appointments, and call your doctor if you are having problems. It's also a good idea to know your test results and keep a list of the medicines you take. How can you care for yourself at home? · Practice healthy eating habits. This includes eating plenty of fruits, vegetables, whole grains, lean protein, and low-fat dairy. · If your doctor recommends it, get more exercise. Walking is a good choice. Bit by bit, increase the amount you walk every day.  Try for at least 30 minutes on most days of the week. · Do not smoke. Smoking can increase your risk for health problems. If you need help quitting, talk to your doctor about stop-smoking programs and medicines. These can increase your chances of quitting for good. · Limit alcohol to 2 drinks a day for men and 1 drink a day for women. Too much alcohol can cause health problems. If you have a BMI higher than 25  · Your doctor may do other tests to check your risk for weight-related health problems. This may include measuring the distance around your waist. A waist measurement of more than 40 inches in men or 35 inches in women can increase the risk of weight-related health problems. · Talk with your doctor about steps you can take to stay healthy or improve your health. You may need to make lifestyle changes to lose weight and stay healthy, such as changing your diet and getting regular exercise. If you have a BMI lower than 18.5  · Your doctor may do other tests to check your risk for health problems. · Talk with your doctor about steps you can take to stay healthy or improve your health. You may need to make lifestyle changes to gain or maintain weight and stay healthy, such as getting more healthy foods in your diet and doing exercises to build muscle. Where can you learn more? Go to http://lavonne-frances.info/. Enter S176 in the search box to learn more about \"Body Mass Index: Care Instructions. \"  Current as of: October 13, 2016  Content Version: 11.4  © 2494-1615 [employer]. Care instructions adapted under license by [employer] (which disclaims liability or warranty for this information). If you have questions about a medical condition or this instruction, always ask your healthcare professional. [address] any warranty or liability for your use of this information.

## 2019-02-26 ENCOUNTER — HOSPITAL ENCOUNTER (OUTPATIENT)
Dept: LAB | Age: 69
Discharge: HOME | End: 2019-02-26
Payer: MEDICAID

## 2019-02-26 ENCOUNTER — LAB ONLY (OUTPATIENT)
Dept: FAMILY MEDICINE CLINIC | Age: 69
End: 2019-02-26

## 2019-02-26 DIAGNOSIS — Z12.5 SCREENING FOR MALIGNANT NEOPLASM OF PROSTATE: ICD-10-CM

## 2019-02-26 DIAGNOSIS — Z01.89 ENCOUNTER FOR LABORATORY TEST: Primary | ICD-10-CM

## 2019-02-26 LAB
BASOPHILS # BLD: 0 K/UL (ref 0–0.1)
BASOPHILS NFR BLD: 0 % (ref 0–2)
DIFFERENTIAL METHOD BLD: ABNORMAL
EOSINOPHIL # BLD: 0.2 K/UL (ref 0–0.4)
EOSINOPHIL NFR BLD: 6 % (ref 0–5)
ERYTHROCYTE [DISTWIDTH] IN BLOOD BY AUTOMATED COUNT: 13.4 % (ref 11.6–14.5)
HCT VFR BLD AUTO: 40.4 % (ref 36–48)
HGB BLD-MCNC: 13.3 G/DL (ref 13–16)
LYMPHOCYTES # BLD: 1.3 K/UL (ref 0.9–3.6)
LYMPHOCYTES NFR BLD: 40 % (ref 21–52)
MCH RBC QN AUTO: 29.6 PG (ref 24–34)
MCHC RBC AUTO-ENTMCNC: 32.9 G/DL (ref 31–37)
MCV RBC AUTO: 90 FL (ref 74–97)
MONOCYTES # BLD: 0.3 K/UL (ref 0.05–1.2)
MONOCYTES NFR BLD: 9 % (ref 3–10)
NEUTS SEG # BLD: 1.4 K/UL (ref 1.8–8)
NEUTS SEG NFR BLD: 45 % (ref 40–73)
PLATELET # BLD AUTO: 223 K/UL (ref 135–420)
PMV BLD AUTO: 10 FL (ref 9.2–11.8)
RBC # BLD AUTO: 4.49 M/UL (ref 4.7–5.5)
WBC # BLD AUTO: 3.1 K/UL (ref 4.6–13.2)

## 2019-02-26 PROCEDURE — 84153 ASSAY OF PSA TOTAL: CPT

## 2019-02-26 PROCEDURE — 80061 LIPID PANEL: CPT

## 2019-02-26 PROCEDURE — 80053 COMPREHEN METABOLIC PANEL: CPT

## 2019-02-26 PROCEDURE — 85025 COMPLETE CBC W/AUTO DIFF WBC: CPT

## 2019-02-26 NOTE — PROGRESS NOTES
Patient presents for lab draw ordered by:    Ordering Provider:  Josiah Gonzalez Department/Practice:  [facility]  Phone:  [phone]  Date Ordered:  02/25/2019    The following labs were drawn and sent to [facility] lab at [facility] by Polina Barrientos LPN:    CBC, Lipid Profile and PSA,CMP    The following tubes were sent:    Lavender  ( 1)    Gel  1      Pt in for lab visit. Venipuncture done in right outer arm. Blood specimen obtained. Pt tolerated well. No comps.

## 2019-02-27 LAB
ALBUMIN SERPL-MCNC: 4.1 G/DL (ref 3.4–5)
ALBUMIN/GLOB SERPL: 1.2 {RATIO} (ref 0.8–1.7)
ALP SERPL-CCNC: 101 U/L (ref 45–117)
ALT SERPL-CCNC: 35 U/L (ref 16–61)
ANION GAP SERPL CALC-SCNC: 10 MMOL/L (ref 3–18)
AST SERPL-CCNC: 20 U/L (ref 15–37)
BILIRUB SERPL-MCNC: 0.3 MG/DL (ref 0.2–1)
BUN SERPL-MCNC: 12 MG/DL (ref 7–18)
BUN/CREAT SERPL: 13 (ref 12–20)
CALCIUM SERPL-MCNC: 9 MG/DL (ref 8.5–10.1)
CHLORIDE SERPL-SCNC: 101 MMOL/L (ref 100–108)
CHOLEST SERPL-MCNC: 205 MG/DL
CO2 SERPL-SCNC: 26 MMOL/L (ref 21–32)
CREAT SERPL-MCNC: 0.94 MG/DL (ref 0.6–1.3)
GLOBULIN SER CALC-MCNC: 3.5 G/DL (ref 2–4)
GLUCOSE SERPL-MCNC: 96 MG/DL (ref 74–99)
HDLC SERPL-MCNC: 52 MG/DL (ref 40–60)
HDLC SERPL: 3.9 {RATIO} (ref 0–5)
LDLC SERPL CALC-MCNC: 126 MG/DL (ref 0–100)
LIPID PROFILE,FLP: ABNORMAL
POTASSIUM SERPL-SCNC: 3.5 MMOL/L (ref 3.5–5.5)
PROT SERPL-MCNC: 7.6 G/DL (ref 6.4–8.2)
PSA SERPL-MCNC: 1.8 NG/ML (ref 0–4)
SODIUM SERPL-SCNC: 137 MMOL/L (ref 136–145)
TRIGL SERPL-MCNC: 135 MG/DL (ref ?–150)
VLDLC SERPL CALC-MCNC: 27 MG/DL

## 2019-03-04 NOTE — PROGRESS NOTES
Please let patient know his LDL cholesterol is elevated. He takes Lipitor 40 mg daily. I would recommend he intensify diet modification and cut out polysaturated fats in his diet. He should also exercise. Recheck lipid panel in 3 months. If still elevated I would recommend going up on the Lipitor to 80 mg daily. Rest of his labs are reassuring.   Noelle John MD

## 2019-03-12 NOTE — PROGRESS NOTES
Spoke with patient at this time. Informed patient of lab results.  Patient verbalized understanding at this time

## 2019-04-25 DIAGNOSIS — F39 MOOD DISORDER (HCC): ICD-10-CM

## 2019-04-27 RX ORDER — SERTRALINE HYDROCHLORIDE 50 MG/1
TABLET, FILM COATED ORAL
Qty: 135 TAB | Refills: 0 | Status: SHIPPED | OUTPATIENT
Start: 2019-04-27 | End: 2019-07-13 | Stop reason: SDUPTHER

## 2019-05-12 DIAGNOSIS — I10 ESSENTIAL HYPERTENSION: ICD-10-CM

## 2019-05-17 RX ORDER — HYDRALAZINE HYDROCHLORIDE 25 MG/1
TABLET, FILM COATED ORAL
Qty: 270 TAB | Refills: 0 | OUTPATIENT
Start: 2019-05-17

## 2019-05-17 NOTE — TELEPHONE ENCOUNTER
5/17/2019  10:39 AM    Chief Complaint   Patient presents with    Medication Refill       Noted refill request from pharmacy for Hydralazine 25 mg. Medication list shows that he is taking 50 mg. Need clarification. Forwarded to clinical staff.  PCP Eddi Yadav MD

## 2019-05-23 RX ORDER — HYDRALAZINE HYDROCHLORIDE 50 MG/1
TABLET, FILM COATED ORAL
Qty: 270 TAB | Refills: 1 | Status: SHIPPED | OUTPATIENT
Start: 2019-05-23 | End: 2019-11-08 | Stop reason: SDUPTHER

## 2019-05-23 NOTE — TELEPHONE ENCOUNTER
Pt stated that he had been taking Hydralazine 25mg tabs and he was taking 2 tabs three times a day. He would like his refill on this to be 50mg tabs and he will continue three times daily, he prefers to take only 1 tab TID.   I've pended script to be sent to [place] for a 3 month supply per patient/insurance request

## 2019-05-27 RX ORDER — AMLODIPINE BESYLATE 10 MG/1
TABLET ORAL
Qty: 90 TAB | Refills: 0 | Status: SHIPPED | OUTPATIENT
Start: 2019-05-27 | End: 2019-08-10 | Stop reason: SDUPTHER

## 2019-06-24 DIAGNOSIS — I10 ESSENTIAL HYPERTENSION: ICD-10-CM

## 2019-06-24 RX ORDER — LOSARTAN POTASSIUM 100 MG/1
TABLET ORAL
Qty: 90 TAB | Refills: 0 | Status: SHIPPED | OUTPATIENT
Start: 2019-06-24 | End: 2019-10-02 | Stop reason: SDUPTHER

## 2019-07-13 DIAGNOSIS — F39 MOOD DISORDER (HCC): ICD-10-CM

## 2019-07-14 RX ORDER — SERTRALINE HYDROCHLORIDE 50 MG/1
TABLET, FILM COATED ORAL
Qty: 135 TAB | Refills: 0 | Status: SHIPPED | OUTPATIENT
Start: 2019-07-14 | End: 2019-10-14 | Stop reason: SDUPTHER

## 2019-08-12 RX ORDER — AMLODIPINE BESYLATE 10 MG/1
TABLET ORAL
Qty: 90 TAB | Refills: 0 | Status: SHIPPED | OUTPATIENT
Start: 2019-08-12 | End: 2019-12-30

## 2019-09-11 RX ORDER — AMLODIPINE BESYLATE 10 MG/1
TABLET ORAL
Qty: 90 TAB | Refills: 0 | Status: SHIPPED | OUTPATIENT
Start: 2019-09-11 | End: 2019-10-03 | Stop reason: SDUPTHER

## 2019-09-22 DIAGNOSIS — F39 MOOD DISORDER (HCC): ICD-10-CM

## 2019-09-23 RX ORDER — SERTRALINE HYDROCHLORIDE 50 MG/1
TABLET, FILM COATED ORAL
Qty: 135 TAB | Refills: 0 | Status: SHIPPED | OUTPATIENT
Start: 2019-09-23 | End: 2019-10-03 | Stop reason: SDUPTHER

## 2019-10-02 DIAGNOSIS — I10 ESSENTIAL HYPERTENSION: ICD-10-CM

## 2019-10-03 ENCOUNTER — HOSPITAL ENCOUNTER (OUTPATIENT)
Dept: LAB | Age: 69
Discharge: HOME | End: 2019-10-03
Payer: MEDICAID

## 2019-10-03 ENCOUNTER — OFFICE VISIT (OUTPATIENT)
Dept: FAMILY MEDICINE CLINIC | Age: 69
End: 2019-10-03

## 2019-10-03 VITALS
DIASTOLIC BLOOD PRESSURE: 89 MMHG | TEMPERATURE: 96.4 F | SYSTOLIC BLOOD PRESSURE: 145 MMHG | WEIGHT: 177 LBS | OXYGEN SATURATION: 98 % | HEART RATE: 70 BPM | BODY MASS INDEX: 29.49 KG/M2 | HEIGHT: 65 IN | RESPIRATION RATE: 18 BRPM

## 2019-10-03 DIAGNOSIS — R07.9 CHEST PAIN, UNSPECIFIED TYPE: ICD-10-CM

## 2019-10-03 DIAGNOSIS — F39 MOOD DISORDER (HCC): ICD-10-CM

## 2019-10-03 DIAGNOSIS — Z23 ENCOUNTER FOR IMMUNIZATION: ICD-10-CM

## 2019-10-03 DIAGNOSIS — R21 RASH AND NONSPECIFIC SKIN ERUPTION: ICD-10-CM

## 2019-10-03 DIAGNOSIS — Z01.89 ENCOUNTER FOR LABORATORY EXAMINATION: ICD-10-CM

## 2019-10-03 DIAGNOSIS — R19.4 CHANGE IN BOWEL HABIT: ICD-10-CM

## 2019-10-03 DIAGNOSIS — K62.5 RECTAL BLEEDING: ICD-10-CM

## 2019-10-03 DIAGNOSIS — R07.9 CHEST PAIN, UNSPECIFIED TYPE: Primary | ICD-10-CM

## 2019-10-03 DIAGNOSIS — J30.0 VASOMOTOR RHINITIS: ICD-10-CM

## 2019-10-03 DIAGNOSIS — R51.9 NONINTRACTABLE EPISODIC HEADACHE, UNSPECIFIED HEADACHE TYPE: ICD-10-CM

## 2019-10-03 DIAGNOSIS — H53.9 CHANGE IN VISION: ICD-10-CM

## 2019-10-03 LAB
ALBUMIN SERPL-MCNC: 3.9 G/DL (ref 3.4–5)
ALBUMIN/GLOB SERPL: 1.1 {RATIO} (ref 0.8–1.7)
ALP SERPL-CCNC: 118 U/L (ref 45–117)
ALT SERPL-CCNC: 27 U/L (ref 16–61)
ANION GAP SERPL CALC-SCNC: 7 MMOL/L (ref 3–18)
AST SERPL-CCNC: 22 U/L (ref 10–38)
BASOPHILS # BLD: 0 K/UL (ref 0–0.1)
BASOPHILS NFR BLD: 0 % (ref 0–2)
BILIRUB SERPL-MCNC: 0.3 MG/DL (ref 0.2–1)
BUN SERPL-MCNC: 10 MG/DL (ref 7–18)
BUN/CREAT SERPL: 10 (ref 12–20)
CALCIUM SERPL-MCNC: 8.9 MG/DL (ref 8.5–10.1)
CHLORIDE SERPL-SCNC: 101 MMOL/L (ref 100–111)
CO2 SERPL-SCNC: 31 MMOL/L (ref 21–32)
CREAT SERPL-MCNC: 1.02 MG/DL (ref 0.6–1.3)
DIFFERENTIAL METHOD BLD: ABNORMAL
EOSINOPHIL # BLD: 0.1 K/UL (ref 0–0.4)
EOSINOPHIL NFR BLD: 3 % (ref 0–5)
ERYTHROCYTE [DISTWIDTH] IN BLOOD BY AUTOMATED COUNT: 14.6 % (ref 11.6–14.5)
GLOBULIN SER CALC-MCNC: 3.7 G/DL (ref 2–4)
GLUCOSE SERPL-MCNC: 94 MG/DL (ref 74–99)
HCT VFR BLD AUTO: 41.7 % (ref 36–48)
HGB BLD-MCNC: 13.5 G/DL (ref 13–16)
LYMPHOCYTES # BLD: 1.4 K/UL (ref 0.9–3.6)
LYMPHOCYTES NFR BLD: 49 % (ref 21–52)
MCH RBC QN AUTO: 30.1 PG (ref 24–34)
MCHC RBC AUTO-ENTMCNC: 32.4 G/DL (ref 31–37)
MCV RBC AUTO: 93.1 FL (ref 74–97)
MONOCYTES # BLD: 0.3 K/UL (ref 0.05–1.2)
MONOCYTES NFR BLD: 11 % (ref 3–10)
NEUTS SEG # BLD: 1.1 K/UL (ref 1.8–8)
NEUTS SEG NFR BLD: 37 % (ref 40–73)
PLATELET # BLD AUTO: 211 K/UL (ref 135–420)
PMV BLD AUTO: 10.1 FL (ref 9.2–11.8)
POTASSIUM SERPL-SCNC: 4.6 MMOL/L (ref 3.5–5.5)
PROT SERPL-MCNC: 7.6 G/DL (ref 6.4–8.2)
RBC # BLD AUTO: 4.48 M/UL (ref 4.7–5.5)
SODIUM SERPL-SCNC: 139 MMOL/L (ref 136–145)
WBC # BLD AUTO: 2.9 K/UL (ref 4.6–13.2)

## 2019-10-03 PROCEDURE — 85025 COMPLETE CBC W/AUTO DIFF WBC: CPT

## 2019-10-03 PROCEDURE — 80061 LIPID PANEL: CPT

## 2019-10-03 PROCEDURE — 80053 COMPREHEN METABOLIC PANEL: CPT

## 2019-10-03 RX ORDER — CLOTRIMAZOLE AND BETAMETHASONE DIPROPIONATE 10; .64 MG/G; MG/G
CREAM TOPICAL
Qty: 45 G | Refills: 2 | Status: SHIPPED | OUTPATIENT
Start: 2019-10-03 | End: 2020-02-06 | Stop reason: SDUPTHER

## 2019-10-03 RX ORDER — LOSARTAN POTASSIUM 100 MG/1
TABLET ORAL
Qty: 90 TAB | Refills: 0 | Status: SHIPPED | OUTPATIENT
Start: 2019-10-03 | End: 2019-11-08 | Stop reason: SDUPTHER

## 2019-10-03 RX ORDER — BUTALBITAL, ACETAMINOPHEN AND CAFFEINE 50; 325; 40 MG/1; MG/1; MG/1
1 TABLET ORAL
Qty: 30 TAB | Refills: 1 | Status: SHIPPED | OUTPATIENT
Start: 2019-10-03 | End: 2020-02-06 | Stop reason: ALTCHOICE

## 2019-10-03 RX ORDER — FLUTICASONE PROPIONATE 50 MCG
SPRAY, SUSPENSION (ML) NASAL
Qty: 1 BOTTLE | Refills: 1 | Status: SHIPPED | OUTPATIENT
Start: 2019-10-03 | End: 2022-06-20 | Stop reason: SDUPTHER

## 2019-10-03 RX ORDER — CETIRIZINE HCL 10 MG
10 TABLET ORAL DAILY
Qty: 30 TAB | Refills: 2 | Status: SHIPPED | OUTPATIENT
Start: 2019-10-03

## 2019-10-03 RX ORDER — FLUTICASONE PROPIONATE 50 MCG
2 SPRAY, SUSPENSION (ML) NASAL DAILY
Qty: 1 BOTTLE | Refills: 1 | Status: SHIPPED | OUTPATIENT
Start: 2019-10-03 | End: 2019-10-03 | Stop reason: SDUPTHER

## 2019-10-03 NOTE — PROGRESS NOTES
Flu vaccination ordered at this time by PCP. Injection given to left deltoid at this time.  Patient tolerated well at this time

## 2019-10-03 NOTE — PROGRESS NOTES
Chief Complaint   Patient presents with    Rectal Bleeding     hemmorids in the past     Excessive Sweating     with sharp pain in chest     Skin Problem     1. Have you been to the ER, urgent care clinic since your last visit? Hospitalized since your last visit? No    2. Have you seen or consulted any other health care providers outside of the [address] [place] [address] since your last visit? Include any pap smears or colon screening. No     HPI  [employer]. comes in for f/u care. Chest pain: Patient has left-sided chest pain. This has been ongoing for some weeks. It comes on and off. Feels like a pressure. Associated shortness of breath. Not related to exertion. Lasts for few minutes and then dissipates. Does not radiate. Denies cough, wheeze or hemoptysis. He does have some diaphoresis. Did do an EKG that was negative for any ST elevations or depressions. Will check labs. Will refer him to the cardiologist for further evaluation. Patient advised that next time he gets the chest pain he does need to go to the emergency room for evaluation. Headache: Patient has a history of migraine headaches. He does get headaches on and off. Uses Fioricet for this. Would like a refill of medication. Prescription will be sent in. Rash: Patient has rash that is itchy. This affects his feet and in between his thighs. This is fungal dermatitis. He also has athlete's foot. This is noted in between on his toes. I will give Lotrisone cream.  We will follow-up at next visit. The rash is itchy. Patient is on cetirizine. Has tried hydroxyzine in the past.  This is not helped much. Occasionally has to use Benadryl. Hypertension: Patient's blood pressure slightly elevated today. He denies focal weakness. He is on hydralazine, amlodipine, losartan, HCTZ and metoprolol. We will continue with the current treatment plan. I will follow-up at next visit.   Change in bowel habits: Patient has noted changes in bowel habits. He has diarrhea that is at times bloody. He has a history of hemorrhoids but does not feel like the hemorrhoids are acting up. No nausea vomiting. No abdominal pain or distention. Diarrhea occurs after meals. At times has 2-3 loose motions in a day. This is change in bowel habits. Will refer to gastroenterologist.  May need to have a colonoscopy done. Will check labs today. Rhinitis: Patient has nasal congestion and nasal drip. This is vasomotor rhinitis. Will give Flonase. Will give cetirizine. We will follow-up at next visit. Change in visual acuity: Patient has diminished vision left eye. Had a cataract and later on surgery was done. Since then patient did not improve. He does have a discharge in his left eye on and off. Feels like there is haziness and blurry vision in that eye. Will refer to an ophthalmologist.  He would like to see a different ophthalmologist from the one he has been seeing. Referral is placed. Mood disorder: Patient has a history of irritability and anger. He is on Zoloft. Stable on this medication. We will continue with this current treatment plan. Health maintenance: Patient will have the flu vaccine today. Overweight: Patient has a BMI of 29.45. Discussed lifestyle and dietary modification. He will intensify this in an effort to cut down on his weight.     Past Medical History  Past Medical History:   Diagnosis Date    BPH (benign prostatic hyperplasia)     Depression     Hyperlipidemia     Hypertension     Irritability and anger     Syncope 2017    no injuries       Surgical History  Past Surgical History:   Procedure Laterality Date    COLONOSCOPY N/A 7/19/2017    COLONOSCOPY / polypectomy performed by Cristy Jones MD at [address] [place]    plate placed in head due to injury    GA COLSC FLX W/REMOVAL LESION BY HOT BX FORCEPS N/A 07/19/2017    Dr. Kallie Potts        Medications  Current Outpatient Medications   Medication Sig Dispense Refill    amLODIPine (NORVASC) 10 mg tablet TAKE 1 TABLET BY MOUTH DAILY FOR BLOOD PRESSURE 90 Tab 0    sertraline (ZOLOFT) 50 mg tablet TAKE 1 AND 1/2 TABLETS BY MOUTH DAILY 135 Tab 0    losartan (COZAAR) 100 mg tablet TAKE 1 TABLET BY MOUTH DAILY FOR HIGH BLOOD PRESSURE 90 Tab 0    hydrALAZINE (APRESOLINE) 50 mg tablet TAKE 1 TABLET BY MOUTH THREE TIMES DAILY 270 Tab 1    hydroCHLOROthiazide (HYDRODIURIL) 25 mg tablet TAKE 1 TABLET BY MOUTH DAILY 60 Tab 0    omeprazole (PRILOSEC) 20 mg capsule TAKE ONE CAPSULE BY MOUTH DAILY 90 Cap 0    sertraline (ZOLOFT) 50 mg tablet TAKE 1 AND 1/2 TABLETS BY MOUTH DAILY 135 Tab 0    diclofenac EC (VOLTAREN) 50 mg EC tablet TAKE 1 TABLET BY MOUTH TWICE DAILY AS NEEDED 180 Tab 0    triamcinolone acetonide (KENALOG) 0.025 % topical cream Apply  to affected area two (2) times a day.  use thin layer 80 g 0    atorvastatin (LIPITOR) 40 mg tablet TAKE 1 TABLET BY MOUTH DAILY 90 Tab 0    codeine-butalbital-acetaminophen-caffeine (FIORICET WITH CODEINE) -30-30 mg capsule TAKE 1 CAPSULE BY MOUTH EVERY 6 HOURS AS NEEDED FOR HEADACHE OR MIGRAINE 30 Cap 0    metoprolol succinate (TOPROL-XL) 50 mg XL tablet TAKE 1 TABLET BY MOUTH DAILY 90 Tab 3    sertraline (ZOLOFT) 50 mg tablet TAKE 1 AND 1/2 TABLETS BY MOUTH DAILY 135 Tab 0    amLODIPine (NORVASC) 10 mg tablet TAKE 1 TABLET BY MOUTH DAILY FOR BLOOD PRESSURE 90 Tab 0       Allergies  No Known Allergies    Family History  Family History   Problem Relation Age of Onset    Diabetes Mother     Heart Disease Father     Diabetes Father     Heart Attack Neg Hx     Stroke Neg Hx     Sudden Death Neg Hx        Social History  Social History     Socioeconomic History    Marital status: SINGLE     Spouse name: Not on file    Number of children: Not on file    Years of education: Not on file    Highest education level: Not on file   Occupational History    Not on file   Social Needs  Financial resource strain: Not on file   Ilfeld-Maggie insecurity:     Worry: Not on file     Inability: Not on file    Transportation needs:     Medical: Not on file     Non-medical: Not on file   Tobacco Use    Smoking status: Never Smoker    Smokeless tobacco: Never Used   Substance and Sexual Activity    Alcohol use: No    Drug use: No    Sexual activity: Yes     Partners: Female   Lifestyle    Physical activity:     Days per week: Not on file     Minutes per session: Not on file    Stress: Not on file   Relationships    Social connections:     Talks on phone: Not on file     Gets together: Not on file     Attends Quaker service: Not on file     Active member of club or organization: Not on file     Attends meetings of clubs or organizations: Not on file     Relationship status: Not on file    Intimate partner violence:     Fear of current or ex partner: Not on file     Emotionally abused: Not on file     Physically abused: Not on file     Forced sexual activity: Not on file   Other Topics Concern    Not on file   Social History Narrative    Not on file       Review of Systems  Review of Systems - Review of all systems is negative except as noted above in the HPI.     Vital Signs  Visit Vitals  /89 (BP 1 Location: Left arm, BP Patient Position: Sitting)   Pulse 70   Temp 96.4 °F (35.8 °C) (Oral)   Resp 18   Ht 5' 5\" (1.651 m)   Wt 177 lb (80.3 kg)   SpO2 98%   BMI 29.45 kg/m²         Physical Exam  Physical Examination: General appearance - oriented to person, place, and time and acyanotic, in no respiratory distress  Mental status - alert, oriented to person, place, and time, affect appropriate to mood  Mouth - mucous membranes moist, pharynx normal without lesions  Neck - supple, no significant adenopathy  Lymphatics - no palpable lymphadenopathy  Chest - no tachypnea, retractions or cyanosis, decreased air entry noted bilateral lung bases  Heart - S1 and S2 normal  Abdomen - no rebound tenderness noted  Back exam - limited range of motion  Neurological - abnormal neurological exam unchanged from prior examinations  Musculoskeletal - osteoarthritic changes noted in both hands  Extremities - intact peripheral pulses    Results  Results for orders placed or performed during the hospital encounter of 02/26/19   PSA SCREENING (SCREENING)   Result Value Ref Range    Prostate Specific Ag 1.8 0.0 - 4.0 ng/mL   CBC WITH AUTOMATED DIFF   Result Value Ref Range    WBC 3.1 (L) 4.6 - 13.2 K/uL    RBC 4.49 (L) 4.70 - 5.50 M/uL    HGB 13.3 13.0 - 16.0 g/dL    HCT 40.4 36.0 - 48.0 %    MCV 90.0 74.0 - 97.0 FL    MCH 29.6 24.0 - 34.0 PG    MCHC 32.9 31.0 - 37.0 g/dL    RDW 13.4 11.6 - 14.5 %    PLATELET 288 964 - 515 K/uL    MPV 10.0 9.2 - 11.8 FL    NEUTROPHILS 45 40 - 73 %    LYMPHOCYTES 40 21 - 52 %    MONOCYTES 9 3 - 10 %    EOSINOPHILS 6 (H) 0 - 5 %    BASOPHILS 0 0 - 2 %    ABS. NEUTROPHILS 1.4 (L) 1.8 - 8.0 K/UL    ABS. LYMPHOCYTES 1.3 0.9 - 3.6 K/UL    ABS. MONOCYTES 0.3 0.05 - 1.2 K/UL    ABS. EOSINOPHILS 0.2 0.0 - 0.4 K/UL    ABS. BASOPHILS 0.0 0.0 - 0.1 K/UL    DF AUTOMATED     LIPID PANEL   Result Value Ref Range    LIPID PROFILE          Cholesterol, total 205 (H) <200 MG/DL    Triglyceride 135 <150 MG/DL    HDL Cholesterol 52 40 - 60 MG/DL    LDL, calculated 126 (H) 0 - 100 MG/DL    VLDL, calculated 27 MG/DL    CHOL/HDL Ratio 3.9 0 - 5.0     METABOLIC PANEL, COMPREHENSIVE   Result Value Ref Range    Sodium 137 136 - 145 mmol/L    Potassium 3.5 3.5 - 5.5 mmol/L    Chloride 101 100 - 108 mmol/L    CO2 26 21 - 32 mmol/L    Anion gap 10 3.0 - 18 mmol/L    Glucose 96 74 - 99 mg/dL    BUN 12 7.0 - 18 MG/DL    Creatinine 0.94 0.6 - 1.3 MG/DL    BUN/Creatinine ratio 13 12 - 20      GFR est AA >60 >60 ml/min/1.73m2    GFR est non-AA >60 >60 ml/min/1.73m2    Calcium 9.0 8.5 - 10.1 MG/DL    Bilirubin, total 0.3 0.2 - 1.0 MG/DL    ALT (SGPT) 35 16 - 61 U/L    AST (SGOT) 20 15 - 37 U/L    Alk.  phosphatase 101 45 - 117 U/L    Protein, total 7.6 6.4 - 8.2 g/dL    Albumin 4.1 3.4 - 5.0 g/dL    Globulin 3.5 2.0 - 4.0 g/dL    A-G Ratio 1.2 0.8 - 1.7         ASSESSMENT and PLAN    ICD-10-CM ICD-9-CM    1. Chest pain, unspecified type R07.9 786.50 AMB POC EKG ROUTINE W/ 12 LEADS, INTER & REP      METABOLIC PANEL, COMPREHENSIVE      CBC WITH AUTOMATED DIFF      LIPID PANEL      REFERRAL TO CARDIOLOGY   2. Rectal bleeding K62.5 569.3    3. Change in bowel habit R19.4 787.99 REFERRAL TO GASTROENTEROLOGY   4. Rash and nonspecific skin eruption R21 782.1 clotrimazole-betamethasone (LOTRISONE) topical cream   5. Vasomotor rhinitis J30.0 477.9 cetirizine (ZYRTEC) 10 mg tablet      DISCONTINUED: fluticasone propionate (FLONASE) 50 mcg/actuation nasal spray   6. Nonintractable episodic headache, unspecified headache type R51 784.0    7. Change in vision H53.9 368.9 REFERRAL TO OPHTHALMOLOGY   8. Mood disorder (HCC) F39 296.90    9. Encounter for immunization Z23 V03.89 INFLUENZA VACCINE INACTIVATED (IIV), SUBUNIT, ADJUVANTED, IM      ADMIN INFLUENZA VIRUS VAC   10. Encounter for laboratory examination Z01.89 V72.60 COLLECTION VENOUS BLOOD,VENIPUNCTURE     reviewed diet, exercise and weight control  cardiovascular risk and specific lipid/LDL goals reviewed  reviewed medications and side effects in detail      I have discussed the diagnosis with the patient and the intended plan of care as seen in the above orders. The patient has received an after-visit summary and questions were answered concerning future plans. I have discussed medication, side effects, and warnings with the patient in detail. The patient verbalized understanding and is in agreement with the plan of care. The patient will contact the office with any additional concerns. More than 50% of 50 minute visit spent counseling and coordinating care with patient face to face on chest pain, change in bowel habits and mood disorder.  Discussed need for compliance with treatment regimen, follow-up, and taking responsibility for his disease process. Kya Harden MD    PLEASE NOTE:   This document has been produced using voice recognition software.  Unrecognized errors in transcription may be present

## 2019-10-04 LAB
CHOLEST SERPL-MCNC: 179 MG/DL
HDLC SERPL-MCNC: 57 MG/DL (ref 40–60)
HDLC SERPL: 3.1 {RATIO} (ref 0–5)
LDLC SERPL CALC-MCNC: 107 MG/DL (ref 0–100)
LIPID PROFILE,FLP: ABNORMAL
TRIGL SERPL-MCNC: 75 MG/DL (ref ?–150)
VLDLC SERPL CALC-MCNC: 15 MG/DL

## 2019-10-07 NOTE — PROGRESS NOTES
Spoke with patient at this time. Patient verbalized understanding at this time regarding lab results.

## 2019-10-07 NOTE — PROGRESS NOTES
Please let patient know his LDL cholesterol is down to 107. He is on atorvastatin. He should continue with the current treatment plan. Rest of his labs are reassuring.   Tracy Morillo MD

## 2019-10-08 DIAGNOSIS — F39 MOOD DISORDER (HCC): ICD-10-CM

## 2019-10-09 RX ORDER — SERTRALINE HYDROCHLORIDE 50 MG/1
TABLET, FILM COATED ORAL
Qty: 135 TAB | Refills: 0 | Status: SHIPPED | OUTPATIENT
Start: 2019-10-09 | End: 2019-10-14 | Stop reason: SDUPTHER

## 2019-10-14 ENCOUNTER — OFFICE VISIT (OUTPATIENT)
Dept: CARDIOLOGY CLINIC | Age: 69
End: 2019-10-14

## 2019-10-14 VITALS
HEIGHT: 65 IN | HEART RATE: 62 BPM | SYSTOLIC BLOOD PRESSURE: 146 MMHG | BODY MASS INDEX: 29.16 KG/M2 | WEIGHT: 175 LBS | DIASTOLIC BLOOD PRESSURE: 85 MMHG

## 2019-10-14 DIAGNOSIS — R07.9 CHEST PAIN, UNSPECIFIED TYPE: Primary | ICD-10-CM

## 2019-10-14 DIAGNOSIS — I10 ESSENTIAL HYPERTENSION: ICD-10-CM

## 2019-10-14 DIAGNOSIS — E78.5 DYSLIPIDEMIA: ICD-10-CM

## 2019-10-14 NOTE — PROGRESS NOTES
HISTORY OF PRESENT ILLNESS  Katy Eckert is a 70 y.o. male. Chest Pain (Angina)    The history is provided by the patient. This is a recurrent problem. The current episode started more than 1 week ago (9/19). The problem has been gradually improving. Duration of episode(s) is 2 minutes. The pain is associated with stress and exertion. The pain is present in the lateral region and left side. The quality of the pain is described as heavy. The pain does not radiate. Associated symptoms include diaphoresis, dizziness and shortness of breath. Pertinent negatives include no claudication, no cough, no fever, no headaches, no malaise/fatigue, no nausea, no near-syncope, no orthopnea, no palpitations, no PND and no vomiting. He has tried nothing for the symptoms. Palpitations    The history is provided by the patient (jittery). The current episode started more than 1 week ago (3/18). The problem has been resolved. The problem is associated with stress. Associated symptoms include diaphoresis, chest pain, dizziness and shortness of breath. Pertinent negatives include no fever, no malaise/fatigue, no claudication, no near-syncope, no orthopnea, no PND, no nausea, no vomiting, no headaches and no cough. Dizziness   The history is provided by the patient. This is a new problem. The current episode started more than 1 week ago (3/18). The problem occurs every several days (had a fall in 2/18). The problem has been rapidly improving. Associated symptoms include chest pain and shortness of breath. Pertinent negatives include no headaches. The symptoms are aggravated by standing and walking. The symptoms are relieved by rest.   Shortness of Breath   The history is provided by the patient (feeling tired). This is a new problem. The problem occurs intermittently. The current episode started more than 1 week ago (3/18). The problem has been resolved. Associated symptoms include chest pain.  Pertinent negatives include no fever, no headaches, no cough, no wheezing, no PND, no orthopnea, no vomiting, no rash, no leg swelling and no claudication. Review of Systems   Constitutional: Positive for diaphoresis. Negative for chills, fever, malaise/fatigue and weight loss. HENT: Negative for nosebleeds. Eyes: Negative for discharge. Respiratory: Positive for shortness of breath. Negative for cough and wheezing. Cardiovascular: Positive for chest pain. Negative for palpitations, orthopnea, claudication, leg swelling, PND and near-syncope. Gastrointestinal: Negative for diarrhea, nausea and vomiting. Genitourinary: Negative for dysuria and hematuria. Musculoskeletal: Negative for joint pain. Skin: Negative for rash. Neurological: Positive for dizziness. Negative for seizures, loss of consciousness and headaches. Endo/Heme/Allergies: Negative for polydipsia. Does not bruise/bleed easily. Psychiatric/Behavioral: Negative for depression and substance abuse. The patient does not have insomnia. No Known Allergies    Past Medical History:   Diagnosis Date    BPH (benign prostatic hyperplasia)     Depression     Hyperlipidemia     Hypertension     Irritability and anger     Syncope 2017    no injuries       Family History   Problem Relation Age of Onset    Diabetes Mother     Heart Disease Father     Diabetes Father     Heart Attack Neg Hx     Stroke Neg Hx     Sudden Death Neg Hx        Social History     Tobacco Use    Smoking status: Never Smoker    Smokeless tobacco: Never Used   Substance Use Topics    Alcohol use: No    Drug use: No        Current Outpatient Medications   Medication Sig    losartan (COZAAR) 100 mg tablet TAKE 1 TABLET BY MOUTH DAILY FOR HIGH BLOOD PRESSURE    clotrimazole-betamethasone (LOTRISONE) topical cream Apply thin layer 2 x day    cetirizine (ZYRTEC) 10 mg tablet Take 1 Tab by mouth daily.     butalbital-acetaminophen-caffeine (FIORICET, ESGIC) -40 mg per tablet Take 1 Tab by mouth every six (6) hours as needed for Pain.  fluticasone propionate (FLONASE) 50 mcg/actuation nasal spray SHAKE LIQUID AND USE 2 SPRAYS IN EACH NOSTRIL DAILY    amLODIPine (NORVASC) 10 mg tablet TAKE 1 TABLET BY MOUTH DAILY FOR BLOOD PRESSURE    hydrALAZINE (APRESOLINE) 50 mg tablet TAKE 1 TABLET BY MOUTH THREE TIMES DAILY    hydroCHLOROthiazide (HYDRODIURIL) 25 mg tablet TAKE 1 TABLET BY MOUTH DAILY    omeprazole (PRILOSEC) 20 mg capsule TAKE ONE CAPSULE BY MOUTH DAILY    sertraline (ZOLOFT) 50 mg tablet TAKE 1 AND 1/2 TABLETS BY MOUTH DAILY    atorvastatin (LIPITOR) 40 mg tablet TAKE 1 TABLET BY MOUTH DAILY    metoprolol succinate (TOPROL-XL) 50 mg XL tablet TAKE 1 TABLET BY MOUTH DAILY     No current facility-administered medications for this visit. Past Surgical History:   Procedure Laterality Date    COLONOSCOPY N/A 7/19/2017    COLONOSCOPY / polypectomy performed by Yossi Hebert MD at [address] [place]    plate placed in head due to injury    CA COLSC FLX W/REMOVAL LESION BY HOT BX FORCEPS N/A 07/19/2017    Dr. Alphonso Connolly       Visit Vitals  /85   Pulse 62   Ht 5' 5\" (1.651 m)   Wt 79.4 kg (175 lb)   BMI 29.12 kg/m²     Vitals:    10/14/19 0951   BP: 146/85   Pulse: 62   Weight: 79.4 kg (175 lb)   Height: 5' 5\" (1.651 m)         Diagnostic Studies:  I have reviewed the relevant tests done on the patient and show as follows  EKG tracings reviewed by me today. CARDIOLOGY STUDIES 4/11/2018   Pharmacological Nuclear Stress Test Result nl scan, nl EF   4/18 ECHO  SUMMARY:  Left ventricle: Systolic function was normal. Ejection fraction was  estimated in the range of 55 % to 60 %. There were no regional wall motion  abnormalities. Right ventricle: The size was at the upper limits of normal.    Mitral valve: There was trivial regurgitation. Pulmonic valve: There was mild regurgitation.     Mr. Vern Stone has a reminder for a \"due or due soon\" health maintenance. I have asked that he contact his primary care provider for follow-up on this health maintenance. Physical Exam   Constitutional: He is oriented to person, place, and time. He appears well-developed and well-nourished. No distress. HENT:   Head: Normocephalic and atraumatic. Mouth/Throat: Normal dentition. Eyes: Right eye exhibits no discharge. Left eye exhibits no discharge. No scleral icterus. Neck: Neck supple. No JVD present. Carotid bruit is not present. No thyromegaly present. Cardiovascular: Normal rate, regular rhythm, S1 normal, S2 normal, normal heart sounds and intact distal pulses. Exam reveals no gallop and no friction rub. No murmur heard. Pulmonary/Chest: Effort normal. He has no wheezes. He has rales (harsh BS in bases). Abdominal: Soft. He exhibits no mass. There is no tenderness. Musculoskeletal: He exhibits no edema. Lymphadenopathy:        Right cervical: No superficial cervical adenopathy present. Left cervical: No superficial cervical adenopathy present. Neurological: He is alert and oriented to person, place, and time. Skin: Skin is warm and dry. No rash noted. Psychiatric: He has a normal mood and affect. His behavior is normal.       ASSESSMENT and PLAN    Patient has lost weight and now comes in the overweight category. Chest rales are persisting and chest pain has recurred. Will get a regular treadmill stress and then a chest x-ray. Diagnoses and all orders for this visit:    1. Chest pain, unspecified type  -     EXERCISE CARDIAC STRESS TEST; Future  -     XR CHEST PA LAT; Future    2. Essential hypertension    3. Dyslipidemia        Pertinent laboratory and test data reviewed and discussed with patient.   See patient instructions also for other medical advice given    Medications Discontinued During This Encounter   Medication Reason    sertraline (ZOLOFT) 50 mg tablet Duplicate Order    sertraline (ZOLOFT) 50 mg tablet Duplicate Order       Follow-up and Dispositions    · Return in about 3 months (around 1/14/2020), or if symptoms worsen or fail to improve, for post test.

## 2019-10-14 NOTE — PROGRESS NOTES
1. Have you been to the ER, urgent care clinic since your last visit? Hospitalized since your last visit? No     2. Have you seen or consulted any other health care providers outside of the [address] [place] [address] since your last visit? Include any pap smears or colon screening. Yes Where: PCP     3. Since your last visit, have you had any of the following symptoms? chest pains. 4.  Have you had any blood work, X-rays or cardiac testing? Yes Where: PCP Reason for visit: Labs        5. Where do you normally have your labs drawn? PCP Office    6. Do you need any refills today?    No

## 2019-10-14 NOTE — PATIENT INSTRUCTIONS
Medications Discontinued During This Encounter   Medication Reason    sertraline (ZOLOFT) 50 mg tablet Duplicate Order    sertraline (ZOLOFT) 50 mg tablet Duplicate Order     MyChart Activation    Thank you for requesting access to [employer]. Please follow the instructions below to securely access and download your online medical record. [employer] allows you to send messages to your doctor, view your test results, renew your prescriptions, schedule appointments, and more. How Do I Sign Up? 1. In your internet browser, go to [email or web address]. 2. Click on the First Time User? Click Here link in the Sign In box. You will see the New Member Sign Up page. 3. Enter your [employer] Access Code exactly as it appears below. You will not need to use this code after youve completed the sign-up process. If you do not sign up before the expiration date, you must request a new code. [employer] Access Code: [email or web address]  Expires: 2019  8:58 AM (This is the date your [employer] access code will )    4. Enter the last four digits of your Social Security Number (xxxx) and Date of Birth (mm/dd/yyyy) as indicated and click Submit. You will be taken to the next sign-up page. 5. Create a [employer] ID. This will be your [employer] login ID and cannot be changed, so think of one that is secure and easy to remember. 6. Create a [employer] password. You can change your password at any time. 7. Enter your Password Reset Question and Answer. This can be used at a later time if you forget your password. 8. Enter your e-mail address. You will receive e-mail notification when new information is available in [address] 9. Click Sign Up. You can now view and download portions of your medical record. 10. Click the Download Summary menu link to download a portable copy of your medical information.     Additional Information    If you have questions, please visit the Frequently Asked Questions section of the [employer] website at [email or web address] Remember, [employer] is NOT to be used for urgent needs. For medical emergencies, dial 911.

## 2019-11-08 DIAGNOSIS — I10 ESSENTIAL HYPERTENSION: ICD-10-CM

## 2019-11-11 RX ORDER — LOSARTAN POTASSIUM 100 MG/1
TABLET ORAL
Qty: 90 TAB | Refills: 0 | Status: SHIPPED | OUTPATIENT
Start: 2019-11-11 | End: 2020-03-25

## 2019-11-11 RX ORDER — HYDRALAZINE HYDROCHLORIDE 50 MG/1
TABLET, FILM COATED ORAL
Qty: 270 TAB | Refills: 0 | Status: SHIPPED | OUTPATIENT
Start: 2019-11-11 | End: 2020-09-28 | Stop reason: SDUPTHER

## 2019-12-08 DIAGNOSIS — F39 MOOD DISORDER (HCC): ICD-10-CM

## 2019-12-08 RX ORDER — SERTRALINE HYDROCHLORIDE 50 MG/1
TABLET, FILM COATED ORAL
Qty: 135 TAB | Refills: 0 | Status: SHIPPED | OUTPATIENT
Start: 2019-12-08 | End: 2020-05-20

## 2019-12-30 RX ORDER — AMLODIPINE BESYLATE 10 MG/1
TABLET ORAL
Qty: 90 TAB | Refills: 0 | Status: SHIPPED | OUTPATIENT
Start: 2019-12-30 | End: 2020-05-18

## 2020-02-06 ENCOUNTER — OFFICE VISIT (OUTPATIENT)
Dept: FAMILY MEDICINE CLINIC | Age: 70
End: 2020-02-06

## 2020-02-06 VITALS
HEART RATE: 68 BPM | BODY MASS INDEX: 30.16 KG/M2 | OXYGEN SATURATION: 98 % | HEIGHT: 65 IN | SYSTOLIC BLOOD PRESSURE: 137 MMHG | DIASTOLIC BLOOD PRESSURE: 85 MMHG | RESPIRATION RATE: 18 BRPM | TEMPERATURE: 96.8 F | WEIGHT: 181 LBS

## 2020-02-06 DIAGNOSIS — E78.5 DYSLIPIDEMIA: ICD-10-CM

## 2020-02-06 DIAGNOSIS — I10 ESSENTIAL HYPERTENSION: ICD-10-CM

## 2020-02-06 DIAGNOSIS — R19.4 CHANGE IN BOWEL HABIT: Primary | ICD-10-CM

## 2020-02-06 DIAGNOSIS — R10.13 DYSPEPSIA AND DISORDER OF FUNCTION OF STOMACH: ICD-10-CM

## 2020-02-06 DIAGNOSIS — K31.9 DYSPEPSIA AND DISORDER OF FUNCTION OF STOMACH: ICD-10-CM

## 2020-02-06 DIAGNOSIS — Z71.89 ACP (ADVANCE CARE PLANNING): ICD-10-CM

## 2020-02-06 DIAGNOSIS — R21 RASH AND NONSPECIFIC SKIN ERUPTION: ICD-10-CM

## 2020-02-06 DIAGNOSIS — E66.9 OBESITY (BMI 30-39.9): ICD-10-CM

## 2020-02-06 DIAGNOSIS — Z00.00 ENCOUNTER FOR MEDICARE ANNUAL WELLNESS EXAM: ICD-10-CM

## 2020-02-06 DIAGNOSIS — R51.9 NONINTRACTABLE HEADACHE, UNSPECIFIED CHRONICITY PATTERN, UNSPECIFIED HEADACHE TYPE: ICD-10-CM

## 2020-02-06 DIAGNOSIS — F39 MOOD DISORDER (HCC): ICD-10-CM

## 2020-02-06 DIAGNOSIS — R14.3 FLATULENCE: ICD-10-CM

## 2020-02-06 DIAGNOSIS — H53.9 CHANGE IN VISION: ICD-10-CM

## 2020-02-06 RX ORDER — BUTALBITAL, ACETAMINOPHEN AND CAFFEINE 50; 325; 40 MG/1; MG/1; MG/1
1 TABLET ORAL
Qty: 30 TAB | Refills: 1 | Status: CANCELLED | OUTPATIENT
Start: 2020-02-06

## 2020-02-06 RX ORDER — SIMETHICONE 80 MG
80 TABLET,CHEWABLE ORAL
Qty: 120 TAB | Refills: 1 | Status: SHIPPED | OUTPATIENT
Start: 2020-02-06 | End: 2020-09-08

## 2020-02-06 RX ORDER — BUTALBITAL, ACETAMINOPHEN AND CAFFEINE 300; 40; 50 MG/1; MG/1; MG/1
1 CAPSULE ORAL
Qty: 60 CAP | Refills: 2 | Status: SHIPPED | OUTPATIENT
Start: 2020-02-06 | End: 2020-10-27 | Stop reason: SDUPTHER

## 2020-02-06 RX ORDER — CLOTRIMAZOLE AND BETAMETHASONE DIPROPIONATE 10; .64 MG/G; MG/G
CREAM TOPICAL
Qty: 45 G | Refills: 2 | Status: SHIPPED | OUTPATIENT
Start: 2020-02-06 | End: 2020-10-06

## 2020-02-06 NOTE — PATIENT INSTRUCTIONS
Medicare Part B Preventive Services Limitations Recommendation Scheduled   Bone Mass Measurement  (age 72 & older, biennial) Requires diagnosis related to osteoporosis or estrogen deficiency. Biennial benefit unless patient has history of long-term glucocorticoid tx or baseline is needed because initial test was by other method N/a     Cardiovascular Screening Blood Tests (every 5 years)  Total cholesterol, HDL, Triglycerides and ECG Order blood work  as a panel if possible and  for adults with routine risk  an electrocardiogram (ECG) at intervals determined by the provider. utd     Colorectal Cancer Screening  -Fecal occult blood test (annual)  -Flexible sigmoidoscopy (5y)  -Screening colonoscopy (10y)  -Barium Enema Colorectal cancer screening should be done for adults age 54-65 with no increased risk factors for colorectal cancer. There are a number of acceptable methods of screening for this type of cancer. Each test has its own benefits and drawbacks. Discuss with your provider what is most appropriate for you during your annual wellness visit.  The different tests include: colonoscopy (considered the best screening method), a fecal occult blood test, a fecal DNA test, and sigmoidoscopy utd     Counseling to Prevent Tobacco Use (up to 8 sessions per year)  - Counseling greater than 3 and up to 10 minutes  - Counseling greater than 10 minutes Patients must be asymptomatic of tobacco-related conditions to receive as preventive service N/a     Diabetes Screening Tests (at least every 3 years, Medicare covers annually or at 6-month intervals for prediabetic patients)    Fasting blood sugar (FBS) or glucose tolerance test (GTT) All adults age 38-68 who are overweight should have a diabetes screening test once every three years.   -Other screening tests & preventive services for persons with diabetes include: an eye exam to screen for diabetic retinopathy, a kidney function test, a foot exam, and stricter control over your cholesterol. N/a     Diabetes Self-Management Training (DSMT) (no USPSTF recommendation) Requires referral by treating physician for patient with diabetes or renal disease. 10 hours of initial DSMT session of no less than 30 minutes each in a continuous 12-month period. 2 hours of follow-up DSMT in subsequent years. N/a     Glaucoma Screening (no USPSTF recommendation) Diabetes mellitus, family history, , age 48 or over,  American, age 72 or over Due     Human Immunodeficiency Virus (HIV) Screening (annually for increased risk patients)  HIV-1 and HIV-2 by EIA, MERYL, rapid antibody test, or oral mucosa transudate Patient must be at increased risk for HIV infection per USPSTF guidelines or pregnant. Tests covered annually for patients at increased risk. Pregnant patients may receive up to 3 test during pregnancy. N/a     Medical Nutrition Therapy (MNT) (for diabetes or renal disease not recommended schedule) Requires referral by treating physician for patient with diabetes or renal disease. Can be provided in same year as diabetes self-management training (DSMT), and CMS recommends medical nutrition therapy take place after DSMT. Up to 3 hours for initial year and 2 hours in subsequent years. N/a     Prostate Cancer Screening (annually up to age 76)  - Digital rectal exam (SHAKIRA)  - Prostate specific antigen (PSA) Annually (age 48 or over), SHAKIRA not paid separately when covered E/M service is provided on same date  Men up to age 76 may need a screening blood test for prostate cancer at certain intervals, depending on their personal and family history. This decision is between the patient and his provider. utd     Seasonal Influenza Vaccination (annually) All adults should have a flu vaccine yearly  utd       Pneumococcal Vaccination (once after 72) All adults  over age 72 should receive the recommended pneumonia vaccines.  Current USPSTF guidelines recommend a series of two vaccines for the best pneumonia protection. utd     Hepatitis B Vaccinations (if medium/high risk) Medium/high risk factors:  End-stage renal disease,  Hemophiliacs who received Factor VIII or IX concentrates, Clients of institutions for the mentally retarded, Persons who live in the same house as a HepB virus carrier, Homosexual men, Illicit injectable drug abusers. N/a     Shingles Vaccination A shingles vaccine is also recommended once in a lifetime after age 61 Due     Ultrasound Screening for Abdominal Aortic Aneurysm (AAA) (once) An Abdominal Aortic Aneurysm (AAA) Screening is recommended for men age 73-68 who has ever smoked in their lifetime.   of the following criteria:  - Men who are 73-68 years old and have smoked more than 100 cigarettes in their lifetime.  -Anyone with a FH of AAA  -Anyone recommended for screening by USPSTF N/a       Hep C All adults born between [facility] should be screened once for Hepatitis C utd     Tetanus  All adults should have a tetanus vaccine every 10 years utd

## 2020-02-06 NOTE — PROGRESS NOTES
HPI  [employer]. comes in for f/u care. He is accompanied by his wife. Change in bowel habits: Patient has noticed changes in his bowel habits. He has diarrhea at times blood-tinged. He has a lot of gas discomfort and flatulence. Denies nausea, vomiting, abdominal distention or abdominal pain. This comes after any meals. Has been ongoing for months. I had previously referred him to the gastroenterologist but he is yet to do this. Information was given for them to make a call and set up an appointment. He does have a history of colon polyps in the past.  May need to have a recheck colonoscopy. In the meantime we will give some simethicone to use for the flatulence. He can use Imodium as needed for severe diarrhea. Migraine headache: Patient has a history of migraine headache. He is on Fioricet. Would like a refill of medication. Would prefer to have the capsule. He denies focal weakness, numbness or tingling. Rash: Patient has a rash between his thighs and in his groin area. He has used Lotrisone in the past with good result. Would like a refill of medication. Prescription is sent in. Change in vision: Patient had surgery to his right eye a few months ago. Since then has changes in vision and strains to see. We discussed following up with the ophthalmologist.  He will call to set up an appointment for reevaluation. HTN: Patient has a history of hypertension. He is on hydralazine, Cozaar, amlodipine and HCTZ. Also on Toprol-XL. Blood pressure has been stable. We will continue with the current treatment plan. Mood disorder: Patient has a history of mood disorder with anger episodes on and off. He is on sertraline. We will continue with this medication. States that with his ongoing health problems sometimes he feels he is about to get an outburst of anger but has been able to control this. He will let us know if he needs adjustment in his medication.   GERD: Patient has gastroesophageal reflux disease. He gets heartburn on and off. He takes Prilosec. We will continue with this medication. Dyslipidemia: Patient is on Lipitor. Tolerating the medication. We will continue with the current treatment plan. Obesity: Patient has a BMI of 30.12. He will intensify lifestyle and dietary modification. Past Medical History  Past Medical History:   Diagnosis Date    BPH (benign prostatic hyperplasia)     Depression     Hyperlipidemia     Hypertension     Irritability and anger     Syncope 2017    no injuries       Surgical History  Past Surgical History:   Procedure Laterality Date    COLONOSCOPY N/A 7/19/2017    COLONOSCOPY / [facility]pect[facility] OTHER SURGICAL  1993    plate placed in head due to injury    [place] COLSC FLX W/REMOVAL LESION BY HOT BX FORCEPS N/A 07/19/2017    Dr. Edelmira Phan        Medications  Current Outpatient Medications   Medication Sig Dispense Refill    amLODIPine (NORVASC) 10 mg tablet TAKE 1 TABLET BY MOUTH DAILY FOR BLOOD PRESSURE 90 Tab 0    sertraline (ZOLOFT) 50 mg tablet TAKE 1 AND 1/2 TABLETS BY MOUTH DAILY 135 Tab 0    hydrALAZINE (APRESOLINE) 50 mg tablet TAKE 1 TABLET BY MOUTH THREE TIMES DAILY 270 Tab 0    losartan (COZAAR) 100 mg tablet TAKE 1 TABLET BY MOUTH DAILY FOR HIGH BLOOD PRESSURE 90 Tab 0    clotrimazole-betamethasone (LOTRISONE) topical cream Apply thin layer 2 x day 45 g 2    cetirizine (ZYRTEC) 10 mg tablet Take 1 Tab by mouth daily.  30 Tab 2    fluticasone propionate (FLONASE) 50 mcg/actuation nasal spray SHAKE LIQUID AND USE 2 SPRAYS IN EACH NOSTRIL DAILY 1 Bottle 1    hydroCHLOROthiazide (HYDRODIURIL) 25 mg tablet TAKE 1 TABLET BY MOUTH DAILY 60 Tab 0    omeprazole (PRILOSEC) 20 mg capsule TAKE ONE CAPSULE BY MOUTH DAILY 90 Cap 0    atorvastatin (LIPITOR) 40 mg tablet TAKE 1 TABLET BY MOUTH DAILY 90 Tab 0    metoprolol succinate (TOPROL-XL) 50 mg XL tablet TAKE 1 TABLET BY MOUTH DAILY 90 Tab 3       Allergies  No Known Allergies    Family History  Family History   Problem Relation Age of Onset    Diabetes Mother     Heart Disease Father     Diabetes Father     Heart Attack Neg Hx     Stroke Neg Hx     Sudden Death Neg Hx        Social History  Social History     Socioeconomic History    Marital status: SINGLE     Spouse name: Not on file    Number of children: Not on file    Years of education: Not on file    Highest education level: Not on file   Occupational History    Not on file   Social Needs    Financial resource strain: Not on file    Food insecurity:     Worry: Not on file     Inability: Not on file    Transportation needs:     Medical: Not on file     Non-medical: Not on file   Tobacco Use    Smoking status: Never Smoker    Smokeless tobacco: Never Used   Substance and Sexual Activity    Alcohol use: No    Drug use: No    Sexual activity: Yes     Partners: Female   Lifestyle    Physical activity:     Days per week: Not on file     Minutes per session: Not on file    Stress: Not on file   Relationships    Social connections:     Talks on phone: Not on file     Gets together: Not on file     Attends Anabaptism service: Not on file     Active member of club or organization: Not on file     Attends meetings of clubs or organizations: Not on file     Relationship status: Not on file    Intimate partner violence:     Fear of current or ex partner: Not on file     Emotionally abused: Not on file     Physically abused: Not on file     Forced sexual activity: Not on file   Other Topics Concern    Not on file   Social History Narrative    Not on file       Review of Systems  Review of Systems - Review of all systems is negative except as noted above in the HPI.     Vital Signs  Visit Vitals  /85 (BP 1 Location: Left arm, BP Patient Position: Sitting)   Pulse 68   Temp 96.8 °F (36 °C) (Oral)   Resp 18   Ht 5' 5\" (1.651 m)   Wt 181 lb (82.1 kg)   SpO2 98% BMI 30.12 kg/m²         Physical Exam  Physical Examination: General appearance - oriented to person, place, and time, overweight and acyanotic, in no respiratory distress  Mental status - alert, oriented to person, place, and time, affect appropriate to mood  Mouth - mucous membranes moist, pharynx normal without lesions  Neck - supple, no significant adenopathy  Lymphatics - no palpable lymphadenopathy, no hepatosplenomegaly  Chest - no tachypnea, retractions or cyanosis  Heart - normal rate and regular rhythm, S1 and S2 normal  Abdomen - no rebound tenderness noted  Back exam - limited range of motion  Neurological - abnormal neurological exam unchanged from prior examinations  Musculoskeletal - osteoarthritic changes noted in both hands  Extremities - intact peripheral pulses    Results  Results for orders placed or performed in visit on 10/14/19   EXERCISE CARDIAC STRESS TEST   Result Value Ref Range    Target  bpm    ST Elevation (mm) 0 mm    ST Depression (mm) 0 mm    Angina Index 0     STRESS SR COREY TREADMILL SCORE 6     Baseline HR 58 bpm    Baseline  mmHg    Stress Base Diastolic BP 84 mmHg    Post peak  bpm    Percent HR 72 %    Stress Base Systolic  mmHg    Stress Rate Pressure Product 18,360 bpm*mmHg    Stress Base Diastolic BP 90 mmHg    Exercise duration time 6:55 min:sec    Estimated workload 9.8 METS    Stress Stage 1 HR 97 bpm    Stress Stage 1 /86 mmHg    Stress Stage 2  bpm    Stress Stage 2 /90 mmHg    Recovery Stage 1 HR 85 bpm    Recovery Stage 1 /90 mmHg    Recovery Stage 2 HR 67 bpm    Recovery Stage 2 /94 mmHg       ASSESSMENT and PLAN    ICD-10-CM ICD-9-CM    1. Change in bowel habit R19.4 787.99    2. Rash and nonspecific skin eruption R21 782.1 clotrimazole-betamethasone (LOTRISONE) topical cream      butalbital-acetaminophen-caff (FIORICET) -40 mg per capsule   3.  Dyspepsia and disorder of function of stomach K31.9 536.8 R10.13     4. Flatulence R14.3 176.3 simethicone (MYLICON) 80 mg chewable tablet   5. Essential hypertension I10 401.9    6. Mood disorder (HCC) F39 296.90    7. Dyslipidemia E78.5 272.4    8. Nonintractable headache, unspecified chronicity pattern, unspecified headache type R51 784.0    9. Change in vision H53.9 368.9    10. Obesity (BMI 30-39. 9) E66.9 278.00    11. Encounter for Medicare annual wellness exam Z00.00 V70.0    12. ACP (advance care planning) Z71.89 V65.49      lab results and schedule of future lab studies reviewed with patient  reviewed diet, exercise and weight control  cardiovascular risk and specific lipid/LDL goals reviewed  reviewed medications and side effects in detail  radiology results and schedule of future radiology studies reviewed with patient    I have discussed the diagnosis with the patient and the intended plan of care as seen in the above orders. The patient has received an after-visit summary and questions were answered concerning future plans. I have discussed medication, side effects, and warnings with the patient in detail. The patient verbalized understanding and is in agreement with the plan of care. The patient will contact the office with any additional concerns. Samaria Figueredo MD    PLEASE NOTE:   This document has been produced using voice recognition software.  Unrecognized errors in transcription may be present

## 2020-02-06 NOTE — PROGRESS NOTES
Chief Complaint   Patient presents with    Annual Wellness Visit    Medication Refill     1. Have you been to the ER, urgent care clinic since your last visit? Hospitalized since your last visit? No    2. Have you seen or consulted any other health care providers outside of the [address] [place] [address] since your last visit? Include any pap smears or colon screening. No  This is the Subsequent Medicare Annual Wellness Exam, performed 12 months or more after the Initial AWV or the last Subsequent AWV    I have reviewed the patient's medical history in detail and updated the computerized patient record. History   [employer]. comes in for medicare wellness exam.      Patient Active Problem List   Diagnosis Code    Mood disorder ([facility] [address] F39    ACP (advance care planning) Z71.89    Essential hypertension I10    Epistaxis R04.0    Headache R51    Lower urinary tract symptoms (LUTS) R39.9    Dyslipidemia E78.5    Chronic pain of right knee M25.561, G89.29    Other chest pain R07.89    Dizziness R42    SOB (shortness of breath) on exertion R06.02    Obesity (BMI 30.0-34. 9) E66.9     Past Medical History:   Diagnosis Date    BPH (benign prostatic hyperplasia)     Depression     Hyperlipidemia     Hypertension     Irritability and anger     Syncope 2017    no injuries      Past Surgical History:   Procedure Laterality Date    COLONOSCOPY N/A 7/19/2017    COLONOSCOPY / polypectomy [facility] Anup Baron [facility] at [facility] ENDOSCOPY    HX OTHER SURGICAL  1993    plate placed in head due to injury    [place] COLSC FLX W/REMOVAL LESION BY HOT BX FORCEPS N/A 07/19/2017    Dr. Lance Jones     Current Outpatient Medications   Medication Sig Dispense Refill    amLODIPine (NORVASC) 10 mg tablet TAKE 1 TABLET BY MOUTH DAILY FOR BLOOD PRESSURE 90 Tab 0    sertraline (ZOLOFT) 50 mg tablet TAKE 1 AND 1/2 TABLETS BY MOUTH DAILY 135 Tab 0    hydrALAZINE (APRESOLINE) 50 mg tablet TAKE 1 TABLET BY MOUTH THREE TIMES DAILY 270 Tab 0    losartan (COZAAR) 100 mg tablet TAKE 1 TABLET BY MOUTH DAILY FOR HIGH BLOOD PRESSURE 90 Tab 0    clotrimazole-betamethasone (LOTRISONE) topical cream Apply thin layer 2 x day 45 g 2    cetirizine (ZYRTEC) 10 mg tablet Take 1 Tab by mouth daily. 30 Tab 2    butalbital-acetaminophen-caffeine (FIORICET, ESGIC) -40 mg per tablet Take 1 Tab by mouth every six (6) hours as needed for Pain. 30 Tab 1    fluticasone propionate (FLONASE) 50 mcg/actuation nasal spray SHAKE LIQUID AND USE 2 SPRAYS IN EACH NOSTRIL DAILY 1 Bottle 1    hydroCHLOROthiazide (HYDRODIURIL) 25 mg tablet TAKE 1 TABLET BY MOUTH DAILY 60 Tab 0    omeprazole (PRILOSEC) 20 mg capsule TAKE ONE CAPSULE BY MOUTH DAILY 90 Cap 0    sertraline (ZOLOFT) 50 mg tablet TAKE 1 AND 1/2 TABLETS BY MOUTH DAILY 135 Tab 0    atorvastatin (LIPITOR) 40 mg tablet TAKE 1 TABLET BY MOUTH DAILY 90 Tab 0    metoprolol succinate (TOPROL-XL) 50 mg XL tablet TAKE 1 TABLET BY MOUTH DAILY 90 Tab 3     No Known Allergies    Family History   Problem Relation Age of Onset    Diabetes Mother     Heart Disease Father     Diabetes Father     Heart Attack Neg Hx     Stroke Neg Hx     Sudden Death Neg Hx      Social History     Tobacco Use    Smoking status: Never Smoker    Smokeless tobacco: Never Used   Substance Use Topics    Alcohol use: No       Depression Risk Factor Screening:     3 most recent PHQ Screens 2/6/2020   PHQ Not Done -   Little interest or pleasure in doing things Several days   Feeling down, depressed, irritable, or hopeless Several days   Total Score PHQ 2 2       Alcohol Risk Factor Screening (MALE > 65): Do you average more 1 drink per night or more than 7 drinks a week: No    In the past three months have you have had more than 4 drinks containing alcohol on one occasion: No      Functional Ability and Level of Safety:   1. Was the patient's timed Up and GO test unsteady or longer than 30 seconds? No  2.  Does the patient need help with the phone, transportation, shopping, preparing meals, housework, laundry, medications or managing money? No  3. Does the patients' home have rugs in the hallway, lack grab bars in the bathroom, lack handrails on the stairs or have poor lighting? No  4. Have you noticed any hearing difficulties? No  Hearing Evaluation:    Hearing: Hearing is good. Activities of Daily Living: The home contains: no safety equipment. Patient does total self care    Ambulation: with no difficulty    Fall Risk:  Fall Risk Assessment, last 12 mths 2/6/2020   Able to walk? Yes   Fall in past 12 months? No   Fall with injury? -   Number of falls in past 12 months -   Fall Risk Score -       Abuse Screen:  Patient is not abused    Cognitive Screening   Has your family/caregiver stated any concerns about your memory: no  Cognitive Screening: Normal - Verbal Fluency Test    Patient Care Team   Patient Care Team:  Savannah Eddy MD as PCP - General (Family Practice)  Savannah Eddy [facility]  Renee Girard MD as Physician (Colon and Rectal Surgery)  Scout Valera MD (Orthopedic Surgery)    Assessment/Plan   Education and counseling provided:  Are appropriate based on today's review and evaluation    1. Encounter for Medicare annual wellness exam    2. ACP (advance care planning)    Health Maintenance Due   Topic Date Due    Shingrix Vaccine Age 49> (1 of 2) 12/12/1999    Medicare Yearly Exam  02/26/2020     I have discussed the diagnosis with the patient and the intended plan of care as seen in the above orders. The patient has received an after-visit summary and questions were answered concerning future plans. I have discussed medication, side effects, and warnings with the patient in detail. The patient verbalized understanding and is in agreement with the plan of care. The patient will contact the office with any additional concerns.   Personalized preventative plan of care was discussed, printed and given to patient.     Renetta Hartmann MD

## 2020-02-07 ENCOUNTER — OFFICE VISIT (OUTPATIENT)
Dept: CARDIOLOGY CLINIC | Age: 70
End: 2020-02-07

## 2020-02-07 VITALS
WEIGHT: 181 LBS | DIASTOLIC BLOOD PRESSURE: 85 MMHG | BODY MASS INDEX: 30.16 KG/M2 | HEART RATE: 64 BPM | HEIGHT: 65 IN | SYSTOLIC BLOOD PRESSURE: 139 MMHG

## 2020-02-07 DIAGNOSIS — E66.9 OBESITY (BMI 30.0-34.9): ICD-10-CM

## 2020-02-07 DIAGNOSIS — R42 DIZZINESS: ICD-10-CM

## 2020-02-07 DIAGNOSIS — I10 ESSENTIAL HYPERTENSION: ICD-10-CM

## 2020-02-07 DIAGNOSIS — R07.9 CHEST PAIN, UNSPECIFIED TYPE: Primary | ICD-10-CM

## 2020-02-07 DIAGNOSIS — E78.5 DYSLIPIDEMIA: ICD-10-CM

## 2020-02-07 NOTE — PROGRESS NOTES
HISTORY OF PRESENT ILLNESS  Leah Heath is a 78 y.o. male. Chest Pain (Angina)    The history is provided by the patient. This is a recurrent problem. The current episode started more than 1 week ago (9/19). The problem has been rapidly improving. Duration of episode(s) is 2 minutes. The problem occurs rarely (1/month). The pain is associated with stress. The pain is present in the lateral region and left side. The quality of the pain is described as heavy. The pain does not radiate. Pertinent negatives include no claudication, no cough, no diaphoresis, no dizziness, no fever, no headaches, no malaise/fatigue, no nausea, no near-syncope, no orthopnea, no palpitations, no PND, no shortness of breath and no vomiting. He has tried nothing for the symptoms. Dizziness   The history is provided by the patient. This is a new problem. The current episode started more than 1 week ago (3/18). The problem occurs every several days (had a fall in 2/18). The problem has been resolved. Associated symptoms include chest pain. Pertinent negatives include no headaches and no shortness of breath. The symptoms are aggravated by standing and walking. The symptoms are relieved by rest.       Review of Systems   Constitutional: Negative for chills, diaphoresis, fever, malaise/fatigue and weight loss. HENT: Negative for nosebleeds. Eyes: Negative for discharge. Respiratory: Negative for cough, shortness of breath and wheezing. Cardiovascular: Positive for chest pain. Negative for palpitations, orthopnea, claudication, leg swelling, PND and near-syncope. Gastrointestinal: Negative for diarrhea, nausea and vomiting. Genitourinary: Negative for dysuria and hematuria. Musculoskeletal: Negative for joint pain. Skin: Negative for rash. Neurological: Negative for dizziness, seizures, loss of consciousness and headaches. Endo/Heme/Allergies: Negative for polydipsia. Does not bruise/bleed easily. Psychiatric/Behavioral: Negative for depression and substance abuse. The patient does not have insomnia. No Known Allergies    Past Medical History:   Diagnosis Date    BPH (benign prostatic hyperplasia)     Depression     Hyperlipidemia     Hypertension     Irritability and anger     Syncope 2017    no injuries       Family History   Problem Relation Age of Onset    Diabetes Mother     Heart Disease Father     Diabetes Father     Heart Attack Neg Hx     Stroke Neg Hx     Sudden Death Neg Hx        Social History     Tobacco Use    Smoking status: Never Smoker    Smokeless tobacco: Never Used   Substance Use Topics    Alcohol use: No    Drug use: No        Current Outpatient Medications   Medication Sig    clotrimazole-betamethasone (LOTRISONE) topical cream Apply thin layer 2 x day    butalbital-acetaminophen-caff (FIORICET) -40 mg per capsule Take 1 Cap by mouth every six (6) hours as needed for Headache.  simethicone (MYLICON) 80 mg chewable tablet Take 1 Tab by mouth every six (6) hours as needed for Flatulence.  amLODIPine (NORVASC) 10 mg tablet TAKE 1 TABLET BY MOUTH DAILY FOR BLOOD PRESSURE    sertraline (ZOLOFT) 50 mg tablet TAKE 1 AND 1/2 TABLETS BY MOUTH DAILY    hydrALAZINE (APRESOLINE) 50 mg tablet TAKE 1 TABLET BY MOUTH THREE TIMES DAILY    losartan (COZAAR) 100 mg tablet TAKE 1 TABLET BY MOUTH DAILY FOR HIGH BLOOD PRESSURE    cetirizine (ZYRTEC) 10 mg tablet Take 1 Tab by mouth daily.  fluticasone propionate (FLONASE) 50 mcg/actuation nasal spray SHAKE LIQUID AND USE 2 SPRAYS IN EACH NOSTRIL DAILY    hydroCHLOROthiazide (HYDRODIURIL) 25 mg tablet TAKE 1 TABLET BY MOUTH DAILY    omeprazole (PRILOSEC) 20 mg capsule TAKE ONE CAPSULE BY MOUTH DAILY    atorvastatin (LIPITOR) 40 mg tablet TAKE 1 TABLET BY MOUTH DAILY    metoprolol succinate (TOPROL-XL) 50 mg XL tablet TAKE 1 TABLET BY MOUTH DAILY     No current facility-administered medications for this visit. Past Surgical History:   Procedure Laterality Date    COLONOSCOPY N/A 7/19/2017    COLONOSCOPY / polypectomy performed by Kacey Tyson MD at [address] [place]    plate placed in head due to injury    [place] COLSC FLX W/REMOVAL LESION BY HOT BX FORCEPS N/A 07/19/2017    Dr. Adolph Martin       Visit Vitals  /85   Pulse 64   Ht 5' 5\" (1.651 m)   Wt 82.1 kg (181 lb)   BMI 30.12 kg/m²     Vitals:    02/07/20 0905   BP: 139/85   Pulse: 64   Weight: 82.1 kg (181 lb)   Height: 5' 5\" (1.651 m)         Diagnostic Studies:  I have reviewed the relevant tests done on the patient and show as follows  EKG tracings reviewed by me today. CARDIOLOGY STUDIES 4/11/2018   Pharmacological Nuclear Stress Test Result nl scan, nl EF   4/18 ECHO  SUMMARY:  Left ventricle: Systolic function was normal. Ejection fraction was  estimated in the range of 55 % to 60 %. There were no regional wall motion  abnormalities. Right ventricle: The size was at the upper limits of normal.    Mitral valve: There was trivial regurgitation. Pulmonic valve: There was mild regurgitation. 10/19 treadmill stress test  Interpretation Summary     · Baseline ECG: Normal EKG, non-specific ST-T wave abnormalitiesPossible ischemia. · Inconclusive stress test.  · ECG is non-diagnostic due to inadequate heart rate. Mr. Justin Petersen has a reminder for a \"due or due soon\" health maintenance. I have asked that he contact his primary care provider for follow-up on this health maintenance. Physical Exam   Constitutional: He is oriented to person, place, and time. He appears well-developed and well-nourished. No distress. HENT:   Head: Normocephalic and atraumatic. Mouth/Throat: Normal dentition. Eyes: Right eye exhibits no discharge. Left eye exhibits no discharge. No scleral icterus. Neck: Neck supple. No JVD present. Carotid bruit is not present. No thyromegaly present.    Cardiovascular: Normal rate, regular rhythm, S1 normal, S2 normal, normal heart sounds and intact distal pulses. Exam reveals no gallop and no friction rub. No murmur heard. Pulmonary/Chest: Effort normal. He has no wheezes. He has no rales. Abdominal: Soft. He exhibits no mass. There is no abdominal tenderness. Musculoskeletal:         General: No edema. Lymphadenopathy:        Right cervical: No superficial cervical adenopathy present. Left cervical: No superficial cervical adenopathy present. Neurological: He is alert and oriented to person, place, and time. Skin: Skin is warm and dry. No rash noted. Psychiatric: He has a normal mood and affect. His behavior is normal.       ASSESSMENT and PLAN    Chest pain is significantly better. Patient has gained a few pounds. Healthy diet and weight management was discussed. More of plant-based diet was recommended. Lung rales have improved and symptoms have significantly improved. Continue present medications. Diagnoses and all orders for this visit:    1. Chest pain, unspecified type    2. Essential hypertension    3. Dyslipidemia    4. Dizziness    5. Obesity (BMI 30.0-34. 9)        Pertinent laboratory and test data reviewed and discussed with patient. See patient instructions also for other medical advice given    There are no discontinued medications. Follow-up and Dispositions    · Return in about 6 months (around 8/7/2020), or if symptoms worsen or fail to improve.

## 2020-02-07 NOTE — PROGRESS NOTES
1. Have you been to the ER, urgent care clinic since your last visit? Hospitalized since your last visit? No     2. Have you seen or consulted any other health care providers outside of the [address] [place] [address] since your last visit? Include any pap smears or colon screening. Yes Where: PCP     3. Since your last visit, have you had any of the following symptoms? .          4. Have you had any blood work, X-rays or cardiac testing? Yes Where: [facility] Reason for visit: Labs         5. Where do you normally have your labs drawn? [facility]    6. Do you need any refills today?    No

## 2020-02-07 NOTE — PATIENT INSTRUCTIONS
There are no discontinued medications. Body Mass Index: Care Instructions  Your Care Instructions    Body mass index (BMI) can help you see if your weight is raising your risk for health problems. It uses a formula to compare how much you weigh with how tall you are. · A BMI lower than 18.5 is considered underweight. · A BMI between 18.5 and 24.9 is considered healthy. · A BMI between 25 and 29.9 is considered overweight. A BMI of 30 or higher is considered obese. If your BMI is in the normal range, it means that you have a lower risk for weight-related health problems. If your BMI is in the overweight or obese range, you may be at increased risk for weight-related health problems, such as high blood pressure, heart disease, stroke, arthritis or joint pain, and diabetes. If your BMI is in the underweight range, you may be at increased risk for health problems such as fatigue, lower protection (immunity) against illness, muscle loss, bone loss, hair loss, and hormone problems. BMI is just one measure of your risk for weight-related health problems. You may be at higher risk for health problems if you are not active, you eat an unhealthy diet, or you drink too much alcohol or use tobacco products. Follow-up care is a key part of your treatment and safety. Be sure to make and go to all appointments, and call your doctor if you are having problems. It's also a good idea to know your test results and keep a list of the medicines you take. How can you care for yourself at home? · Practice healthy eating habits. This includes eating plenty of fruits, vegetables, whole grains, lean protein, and low-fat dairy. · If your doctor recommends it, get more exercise. Walking is a good choice. Bit by bit, increase the amount you walk every day. Try for at least 30 minutes on most days of the week. · Do not smoke. Smoking can increase your risk for health problems.  If you need help quitting, talk to your doctor about stop-smoking programs and medicines. These can increase your chances of quitting for good. · Limit alcohol to 2 drinks a day for men and 1 drink a day for women. Too much alcohol can cause health problems. If you have a BMI higher than 25  · Your doctor may do other tests to check your risk for weight-related health problems. This may include measuring the distance around your waist. A waist measurement of more than 40 inches in men or 35 inches in women can increase the risk of weight-related health problems. · Talk with your doctor about steps you can take to stay healthy or improve your health. You may need to make lifestyle changes to lose weight and stay healthy, such as changing your diet and getting regular exercise. If you have a BMI lower than 18.5  · Your doctor may do other tests to check your risk for health problems. · Talk with your doctor about steps you can take to stay healthy or improve your health. You may need to make lifestyle changes to gain or maintain weight and stay healthy, such as getting more healthy foods in your diet and doing exercises to build muscle. Where can you learn more? Go to http://lavonne-frances.info/. Enter S176 in the search box to learn more about \"Body Mass Index: Care Instructions. \"  Current as of: March 28, 2019  Content Version: 12.2  © 0505-5037 [employer]. Care instructions adapted under license by [employer] (which disclaims liability or warranty for this information). If you have questions about a medical condition or this instruction, always ask your healthcare professional. [place] [address] any warranty or liability for your use of this information.

## 2020-02-07 NOTE — ACP (ADVANCE CARE PLANNING)
Advance Care Planning (ACP) Provider Conversation Snapshot    Date of ACP Conversation: 02/06/20  Persons included in Conversation:  patient and family  Length of ACP Conversation in minutes:  <16 minutes (Non-Billable)    Authorized Decision Maker (if patient is incapable of making informed decisions): This person is:   Wife. Patient will fill out forms given and bring these back in for scanning into the EMR chart. For Patients with Decision Making Capacity:   Values/Goals: Exploration of values, goals, and preferences if recovery is not expected, even with continued medical treatment in the event of:  Imminent death  Severe, permanent brain injury  \"In these circumstances, what matters most to you? \"  Care focused more on comfort or quality of life.     Conversation Outcomes / Follow-Up Plan:   Recommended completion of Advance Directive form after review of ACP materials and conversation with prospective healthcare agent      Cami Walton MD

## 2020-02-11 NOTE — MR AVS SNAPSHOT
Encounter Date: 2/11/2020    SCRIBE #1 NOTE: I, Carmen Chaidez, am scribing for, and in the presence of,  Dr. Mendez. I have scribed the entire note.       History     Chief Complaint   Patient presents with    URI     fever, nasal congestin, HA, recently seen at [facility], tested neg for flu     Khloe Evans is a 14 y.o. female who  has a past medical history of Allergic state, Allergy, Asthma, Asthma, not well controlled, and Eczema.    The patient presents to the ED due to multiple complaints, including frontal headache, cough, body aches, fever, and diarrhea.  Patient was evaluated in ED 2 days ago and was tested for flu, which was negative.  Mother has been giving Tylenol and ibuprofen at home, but remains concerned because she is not sure what is causing her fever.  Patient's Tmax 100.5 F at home this morning, and she was given Tylenol prior to arrival.  Patient denies any nausea, vomiting, neck pain/stiffness, focal weakness, syncope, know sick contacts, or any other complaints.    The history is provided by the patient and the mother.     Review of patient's allergies indicates:   Allergen Reactions    Iodine and iodide containing products     Nuts [tree nut]     Shellfish containing products      Past Medical History:   Diagnosis Date    Allergic state     Allergy     Asthma     Asthma, not well controlled     Eczema      Past Surgical History:   Procedure Laterality Date    None       No family history on file.  Social History     Tobacco Use    Smoking status: Never Smoker    Smokeless tobacco: Never Used    Tobacco comment: No ALXEANDR   Substance Use Topics    Alcohol use: No     Alcohol/week: 0.0 standard drinks    Drug use: No     Review of Systems   Constitutional: Positive for appetite change, fatigue and fever. Negative for activity change and chills.   HENT: Positive for congestion and sore throat.    Respiratory: Positive for cough. Negative for shortness of breath.   [facility] [address] [facility] 
[phone] Patient: Lauren Tapia. MRN: [id number] :1950 Visit Information Date & Time Provider Department Dept. Phone Encounter #  
 2018  9:30 AM Hesed Matt Bach MD [address] [place] Agustin Deal [phone] [id number] Follow-up Instructions Return in about 2 weeks (around 2018), or if symptoms worsen or fail to improve, for htn. Your Appointments 10/19/2018  8:30 AM  
Office Visit with uLpe Hubbard MD  
[facility] [place] ([facility]) Appt Note: 6 months [address] [place] [address] [facility] [address]  
  
   
 [address] [place] [id number] Upcoming Health Maintenance Date Due  
 GLAUCOMA SCREENING Q2Y 2014 ZOSTER VACCINE AGE 60> 10/20/2018* Influenza Age 5 to Adult 2018 MEDICARE YEARLY EXAM 2019 COLONOSCOPY 2022 DTaP/Tdap/Td series (2 - Td) 2027 *Topic was postponed. The date shown is not the original due date. Allergies as of 2018  Review Complete On: 2018 By: Kenyatta Guaman MD  
 No Known Allergies Current Immunizations  Reviewed on 2017 Name Date Influenza High Dose Vaccine PF 10/20/2017 Pneumococcal Polysaccharide (PPSV-23) 2018 Tdap 2017 Not reviewed this visit You Were Diagnosed With   
  
 Codes Comments Essential hypertension    -  Primary ICD-10-CM: I10 
ICD-9-CM: 401.9 Visual changes     ICD-10-CM: H53.9 ICD-9-CM: 368.9 Screening for glaucoma     ICD-10-CM: Z13.5 ICD-9-CM: V80.1 Vitals BP Pulse Temp Resp Height(growth percentile) Weight(growth percentile) 161/89 (BP 1 Location: Left arm, BP Patient Position: Sitting) (!) 58 96.1 °F (35.6 °C) (Oral) 18 5' 5\" (1.651 m) 185 lb (83.9 kg) SpO2 BMI Smoking Status 98% 30.79 kg/m2 Never Smoker   Cardiovascular: Negative for chest pain.   Gastrointestinal: Positive for diarrhea. Negative for abdominal pain, constipation, nausea and vomiting.   Genitourinary: Negative for dysuria, frequency and urgency.   Musculoskeletal: Positive for myalgias. Negative for arthralgias and back pain.   Skin: Negative for rash and wound.   Neurological: Positive for headaches. Negative for syncope and weakness.   Hematological: Does not bruise/bleed easily.   Psychiatric/Behavioral: Negative for agitation, behavioral problems and confusion.       Physical Exam     Initial Vitals [02/11/20 0721]   BP Pulse Resp Temp SpO2   132/76 93 18 98.1 °F (36.7 °C) 96 %      MAP       --         Physical Exam    Nursing note and vitals reviewed.  Constitutional: She appears well-developed and well-nourished. She is not diaphoretic. No distress.   HENT:   Head: Normocephalic and atraumatic.   Right Ear: Tympanic membrane and ear canal normal. No middle ear effusion. No hemotympanum.   Left Ear: Tympanic membrane and ear canal normal.  No middle ear effusion. No hemotympanum.   Mouth/Throat: Uvula is midline, oropharynx is clear and moist and mucous membranes are normal. No oropharyngeal exudate, posterior oropharyngeal edema, posterior oropharyngeal erythema or tonsillar abscesses.   Eyes: EOM are normal. Pupils are equal, round, and reactive to light.   Neck: Normal range of motion and full passive range of motion without pain. Neck supple. No tracheal deviation present.   Cardiovascular: Normal rate, regular rhythm, normal heart sounds and intact distal pulses.   Pulmonary/Chest: Breath sounds normal. No stridor. No respiratory distress. She has no wheezes.   Lungs clear.   Abdominal: Soft. Bowel sounds are normal. She exhibits no distension and no mass. There is no tenderness.   Musculoskeletal: Normal range of motion. She exhibits no edema.   Neurological: She is alert and oriented to person, place, and time. She has normal strength.  Vitals History BMI and BSA Data Body Mass Index Body Surface Area 30.79 kg/m 2 1.96 m 2 Preferred Pharmacy Pharmacy Name Phone [address] [facility] [facility] [phone] Your Updated Medication List  
  
   
This list is accurate as of 6/4/18 10:07 AM.  Always use your most recent med list. amLODIPine 10 mg tablet Commonly known as:  Octaviano Devin TAKE 1 TABLET BY MOUTH DAILY FOR BLOOD PRESSURE  
  
 atorvastatin 40 mg tablet Commonly known as:  LIPITOR  
TAKE 1 TABLET BY MOUTH DAILY  
  
 codeine-butalbital-acetaminophen-caffeine -45-30 mg capsule Commonly known as:  FIORICET WITH CODEINE  
TAKE 1 CAPSULE BY MOUTH EVERY 6 HOURS AS NEEDED FOR HEADACHE OR MIGRAINE  
  
 diclofenac EC 50 mg EC tablet Commonly known as:  VOLTAREN  
TAKE 1 TABLET BY MOUTH TWICE DAILY AS NEEDED  
  
 diclofenac potassium 50 mg tablet Commonly known as:  CATAFLAM  
Take 50 mg by mouth three (3) times daily. hydrALAZINE 50 mg tablet Commonly known as:  APRESOLINE Take 1 Tab by mouth three (3) times daily. hydroCHLOROthiazide 25 mg tablet Commonly known as:  HYDRODIURIL  
TAKE 1 TABLET BY MOUTH DAILY losartan 100 mg tablet Commonly known as:  COZAAR  
TAKE 1 TABLET BY MOUTH DAILY FOR HIGH BLOOD PRESSURE  
  
 metoprolol succinate 50 mg XL tablet Commonly known as:  TOPROL-XL  
TAKE 1 TABLET BY MOUTH DAILY  
  
 omeprazole 20 mg capsule Commonly known as:  PRILOSEC  
TAKE ONE CAPSULE BY MOUTH DAILY  
  
 sertraline 50 mg tablet Commonly known as:  ZOLOFT  
TAKE 1 AND 1/2 TABLETS BY MOUTH DAILY Prescriptions Sent to Pharmacy Refills  
 hydrALAZINE (APRESOLINE) 50 mg tablet 0 Sig: Take 1 Tab by mouth three (3) times daily. Class: Normal  
 Pharmacy: [facility] [address] [facility] Ph #: [phone] No cranial nerve deficit or sensory deficit.   Skin: Skin is warm and dry. Capillary refill takes less than 2 seconds. No pallor.   Psychiatric: She has a normal mood and affect. Her behavior is normal. Thought content normal.         ED Course   Procedures  Labs Reviewed - No data to display       Imaging Results    None          Medical Decision Making:   History:   Old Medical Records: I decided to obtain old medical records.  Old Records Summarized: other records.       <> Summary of Records: Patient seen in [facility] 2/9, flu swab negative, CXR clear.  Diagnosed with viral URI, counseled on fever control.  Initial Assessment:   14 yo F with flu-like symptoms x3 days. Recent flu swab negative at [facility].  Vitals reassuring, exam benign.  Ears and OP WNL, lungs clear, no evidence of bacterial infection.  Likely viral URI, counseled on symptomatic and supportive care.  Differential Diagnosis:   Differential Diagnosis includes, but is not limited to:  Sepsis, meningitis, cavernous sinus thrombosis, nasal/aspirated foreign body, otitis media/externa, nasal polyp, bacterial sinusitis, allergic rhinitis, peritonsillar abscess, retropharyngeal abscess, epiglottitis, bacterial/viral pneumonia, bacterial/viral pharyngitis, croup, bronchiolitis, influenza, viral syndrome.    Clinical Tests:   Lab Tests: Reviewed  Radiological Study: Reviewed  ED Management:  After complete evaluation, including thorough history and physical exam, the patient's symptoms are most likely due to viral upper respiratory infection. There are no concerning features on physical exam to suggest bacterial infection such as otitis media/externa, sinusitis, pharyngitis, or peritonsillar abscess. Vital signs do not suggest sepsis. Lung sounds are clear and not consistent with pneumonia. There is no neck pain or limited ROM to suggest retropharyngeal abscess or meningitis.  Will provide RX for Flonase and Zyrtec upon D/C. Patient's symptoms and response to  Route: Oral  
  
We Performed the Following REFERRAL TO OPHTHALMOLOGY [REF57 Custom] Follow-up Instructions Return in about 2 weeks (around 6/18/2018), or if symptoms worsen or fail to improve, for htn. Referral Information Referral ID Referred By Referred To  
  
 [id number] Ellamae Hashimoto N Not Available Visits Status Start Date End Date 1 New Request 6/4/18 6/4/19 If your referral has a status of pending review or denied, additional information will be sent to support the outcome of this decision. Introducing [facility]! [facility] introduces [employer] patient portal. Now you can access parts of your medical record, email your doctor's office, and request medication refills online. 1. In your internet browser, go to [email or web address]. [employer] 2. Click on the First Time User? Click Here link in the Sign In box. You will see the New Member Sign Up page. 3. Enter your GrandCentral Access Code exactly as it appears below. You will not need to use this code after youve completed the sign-up process. If you do not sign up before the expiration date, you must request a new code. · [employer] Access Code: [email or web address] Expires: 9/2/2018 10:04 AM 
 
4. Enter the last four digits of your Social Security Number (xxxx) and Date of Birth (mm/dd/yyyy) as indicated and click Submit. You will be taken to the next sign-up page. 5. Create a GrandCentral ID. This will be your GrandCentral login ID and cannot be changed, so think of one that is secure and easy to remember. 6. Create a [employer]Central password. You can change your password at any time. 7. Enter your Password Reset Question and Answer. This can be used at a later time if you forget your password. 8. Enter your e-mail address. You will receive e-mail notification when new information is available in [address] 9. Click Sign Up. You can now view and download portions of your medical record. 10. Click the Download Summary menu link to download a portable copy of your medical information. If you have questions, please visit the Frequently Asked Questions section of the [employer] website. Remember, [employer] is NOT to be used for urgent needs. For medical emergencies, dial 911. Now available from your iPhone and Android! Please provide this summary of care documentation to your next provider. Your primary care clinician is listed as Cami Mcgee. If you have any questions after today's visit, please call [phone]. treatment were discussed with the parents/caregiver, and any concerns or questions were addressed/answered. The patient is stable and was instructed to follow-up with primary care provider within 2-3 days for re-evaluation if no clinical improvement.     On re-evaluation, the patient's status has improved.  After complete ED evaluation, clinical impression is most consistent with viral syndrome.  Pediatrician follow-up within 2-3 days was recommended.    After taking into careful account the patient's history, physical exam findings, as well as empirical and objective data obtained throughout ED workup, I feel no emergent medical condition has been identified. No further evaluation or admission was felt to be required, and the patient is stable for discharge from the ED. The patient and any additional family present were updated with test results, overall clinical impression, and recommended further plan of care, including discharge instructions as provided and outpatient follow-up for continued evaluation and management as needed. All questions were answered. The patient expressed understanding and agreed with current plan for discharge and follow-up plan of care. Strict ED return precautions were provided, including return/worsening of current symptoms, new symptoms, or any other concerns.                            Discharge Medication List as of 2/11/2020  7:55 AM      START taking these medications    Details   cetirizine (ZYRTEC) 10 MG tablet Take 1 tablet (10 mg total) by mouth once daily. for 14 days, Starting Tue 2/11/2020, Until Tue 2/25/2020, Print      fluticasone propionate (FLONASE) 50 mcg/actuation nasal spray 1 spray (50 mcg total) by Each Nostril route 2 (two) times daily as needed for Rhinitis or Allergies., Starting Tue 2/11/2020, Until Sun 2/16/2020, Print               Clinical Impression:     1. Viral URI with cough    2. Flu-like symptoms        Disposition:   Disposition:  Discharged  Condition: Stable        I, Dr. Adolfo Mendez, personally performed the services described in this documentation. All medical record entries made by the scribe were at my direction and in my presence.  I have reviewed the chart and agree that the record reflects my personal performance and is accurate and complete.     Adolfo Mendez MD.               Adolfo Mendez MD  02/13/20 0950

## 2020-03-25 DIAGNOSIS — I10 ESSENTIAL HYPERTENSION: ICD-10-CM

## 2020-03-25 RX ORDER — LOSARTAN POTASSIUM 100 MG/1
TABLET ORAL
Qty: 90 TAB | Refills: 0 | Status: SHIPPED | OUTPATIENT
Start: 2020-03-25 | End: 2020-10-06 | Stop reason: SDUPTHER

## 2020-05-18 RX ORDER — AMLODIPINE BESYLATE 10 MG/1
TABLET ORAL
Qty: 90 TAB | Refills: 0 | Status: SHIPPED | OUTPATIENT
Start: 2020-05-18 | End: 2020-09-28 | Stop reason: SDUPTHER

## 2020-05-20 DIAGNOSIS — F39 MOOD DISORDER (HCC): ICD-10-CM

## 2020-05-20 RX ORDER — SERTRALINE HYDROCHLORIDE 50 MG/1
TABLET, FILM COATED ORAL
Qty: 135 TAB | Refills: 0 | Status: SHIPPED | OUTPATIENT
Start: 2020-05-20 | End: 2020-08-10

## 2020-08-09 DIAGNOSIS — F39 MOOD DISORDER (HCC): ICD-10-CM

## 2020-08-10 RX ORDER — SERTRALINE HYDROCHLORIDE 50 MG/1
TABLET, FILM COATED ORAL
Qty: 135 TAB | Refills: 0 | Status: SHIPPED | OUTPATIENT
Start: 2020-08-10 | End: 2020-09-08

## 2020-08-14 ENCOUNTER — OFFICE VISIT (OUTPATIENT)
Dept: CARDIOLOGY CLINIC | Age: 70
End: 2020-08-14

## 2020-08-14 VITALS
SYSTOLIC BLOOD PRESSURE: 132 MMHG | WEIGHT: 170.2 LBS | HEIGHT: 65 IN | BODY MASS INDEX: 28.36 KG/M2 | OXYGEN SATURATION: 96 % | DIASTOLIC BLOOD PRESSURE: 76 MMHG | TEMPERATURE: 97.8 F | HEART RATE: 70 BPM

## 2020-08-14 DIAGNOSIS — I10 ESSENTIAL HYPERTENSION: Primary | ICD-10-CM

## 2020-08-14 DIAGNOSIS — R07.9 CHEST PAIN, UNSPECIFIED TYPE: ICD-10-CM

## 2020-08-14 DIAGNOSIS — E78.5 DYSLIPIDEMIA: ICD-10-CM

## 2020-08-14 NOTE — PROGRESS NOTES
HISTORY OF PRESENT ILLNESS  Celio Hardin is a 78 y.o. male. Dizziness   The history is provided by the patient. This is a recurrent problem. The current episode started more than 1 week ago (3/18). The problem occurs every several days. Associated symptoms include chest pain. Pertinent negatives include no headaches and no shortness of breath. The symptoms are aggravated by standing and walking (Working hard in the hot weather in the yard). The symptoms are relieved by rest.   Chest Pain (Angina)    The history is provided by the patient. This is a recurrent problem. The current episode started more than 1 week ago (9/19). The problem has been gradually worsening (7/20). The problem occurs daily. The pain is associated with stress. The pain is present in the lateral region and left side. The quality of the pain is described as heavy. The pain does not radiate. Associated symptoms include dizziness. Pertinent negatives include no claudication, no cough, no diaphoresis, no fever, no headaches, no malaise/fatigue, no nausea, no near-syncope, no orthopnea, no palpitations, no PND, no shortness of breath and no vomiting. He has tried nothing for the symptoms. Review of Systems   Constitutional: Negative for chills, diaphoresis, fever, malaise/fatigue and weight loss. HENT: Negative for nosebleeds. Eyes: Negative for discharge. Respiratory: Negative for cough, shortness of breath and wheezing. Cardiovascular: Positive for chest pain. Negative for palpitations, orthopnea, claudication, leg swelling, PND and near-syncope. Gastrointestinal: Negative for diarrhea, nausea and vomiting. Genitourinary: Negative for dysuria and hematuria. Musculoskeletal: Negative for joint pain. Skin: Negative for rash. Neurological: Positive for dizziness. Negative for seizures, loss of consciousness and headaches. Endo/Heme/Allergies: Negative for polydipsia. Does not bruise/bleed easily. Psychiatric/Behavioral: Negative for depression and substance abuse. The patient does not have insomnia. No Known Allergies    Past Medical History:   Diagnosis Date    BPH (benign prostatic hyperplasia)     Depression     Hyperlipidemia     Hypertension     Irritability and anger     Syncope 2017    no injuries       Family History   Problem Relation Age of Onset    Diabetes Mother     Heart Disease Father     Diabetes Father     Heart Attack Neg Hx     Stroke Neg Hx     Sudden Death Neg Hx        Social History     Tobacco Use    Smoking status: Never Smoker    Smokeless tobacco: Never Used   Substance Use Topics    Alcohol use: No    Drug use: No        Current Outpatient Medications   Medication Sig    sertraline (ZOLOFT) 50 mg tablet TAKE 1 AND 1/2 TABLETS BY MOUTH DAILY    amLODIPine (NORVASC) 10 mg tablet TAKE 1 TABLET BY MOUTH DAILY FOR BLOOD PRESSURE    losartan (COZAAR) 100 mg tablet TAKE 1 TABLET BY MOUTH DAILY FOR HIGH BLOOD PRESSURE    clotrimazole-betamethasone (LOTRISONE) topical cream Apply thin layer 2 x day    butalbital-acetaminophen-caff (FIORICET) -40 mg per capsule Take 1 Cap by mouth every six (6) hours as needed for Headache.  simethicone (MYLICON) 80 mg chewable tablet Take 1 Tab by mouth every six (6) hours as needed for Flatulence.  hydrALAZINE (APRESOLINE) 50 mg tablet TAKE 1 TABLET BY MOUTH THREE TIMES DAILY    cetirizine (ZYRTEC) 10 mg tablet Take 1 Tab by mouth daily.  (Patient taking differently: Take 10 mg by mouth daily as needed.)    fluticasone propionate (FLONASE) 50 mcg/actuation nasal spray SHAKE LIQUID AND USE 2 SPRAYS IN EACH NOSTRIL DAILY (Patient taking differently: by Both Nostrils route as needed.)    hydroCHLOROthiazide (HYDRODIURIL) 25 mg tablet TAKE 1 TABLET BY MOUTH DAILY    omeprazole (PRILOSEC) 20 mg capsule TAKE ONE CAPSULE BY MOUTH DAILY    metoprolol succinate (TOPROL-XL) 50 mg XL tablet TAKE 1 TABLET BY MOUTH DAILY    atorvastatin (LIPITOR) 40 mg tablet TAKE 1 TABLET BY MOUTH DAILY     No current facility-administered medications for this visit. Past Surgical History:   Procedure Laterality Date    COLONOSCOPY N/A 7/19/2017    COLONOSCOPY / polypectomy performed by Ly Reid MD at [address]    plate placed in head due to injury    OR COLSC FLX W/REMOVAL LESION BY HOT BX FORCEPS N/A 07/19/2017    Dr. De La Torre Prior       Visit Vitals  /76 (BP 1 Location: Left arm, BP Patient Position: Sitting)   Pulse 70   Temp 97.8 °F (36.6 °C) (Temporal)   Ht 5' 5\" (1.651 m)   Wt 77.2 kg (170 lb 3.2 oz)   SpO2 96%   BMI 28.32 kg/m²     Vitals:    08/14/20 0923   BP: 132/76   BP 1 Location: Left arm   BP Patient Position: Sitting   Pulse: 70   Temp: 97.8 °F (36.6 °C)   TempSrc: Temporal   SpO2: 96%   Weight: 77.2 kg (170 lb 3.2 oz)   Height: 5' 5\" (1.651 m)         Diagnostic Studies:  I have reviewed the relevant tests done on the patient and show as follows  EKG tracings reviewed by me today. CARDIOLOGY STUDIES 4/11/2018   Pharmacological Nuclear Stress Test Result nl scan, nl EF   4/18 ECHO  SUMMARY:  Left ventricle: Systolic function was normal. Ejection fraction was  estimated in the range of 55 % to 60 %. There were no regional wall motion  abnormalities. Right ventricle: The size was at the upper limits of normal.    Mitral valve: There was trivial regurgitation. Pulmonic valve: There was mild regurgitation. 10/19 treadmill stress test  Interpretation Summary     · Baseline ECG: Normal EKG, non-specific ST-T wave abnormalitiesPossible ischemia. · Inconclusive stress test.  · ECG is non-diagnostic due to inadequate heart rate. Mr. Celso Alexandra has a reminder for a \"due or due soon\" health maintenance. I have asked that he contact his primary care provider for follow-up on this health maintenance. Physical Exam   Constitutional: He is oriented to person, place, and time.  He appears well-developed and well-nourished. No distress. HENT:   Head: Normocephalic and atraumatic. Mouth/Throat: Normal dentition. Eyes: Right eye exhibits no discharge. Left eye exhibits no discharge. No scleral icterus. Neck: Neck supple. No JVD present. Carotid bruit is not present. No thyromegaly present. Cardiovascular: Normal rate, regular rhythm, S1 normal, S2 normal, normal heart sounds and intact distal pulses. Exam reveals no gallop and no friction rub. No murmur heard. Pulmonary/Chest: Effort normal. He has no wheezes. He has no rales. Abdominal: Soft. He exhibits no mass. There is no abdominal tenderness. Musculoskeletal:         General: No edema. Lymphadenopathy:        Right cervical: No superficial cervical adenopathy present. Left cervical: No superficial cervical adenopathy present. Neurological: He is alert and oriented to person, place, and time. Skin: Skin is warm and dry. No rash noted. Psychiatric: He has a normal mood and affect. His behavior is normal.       ASSESSMENT and PLAN    Chest pain is recurrent and appears musculoskeletal clinically. No further cardiac work-up. Patient has lost weight well. Healthy diet and weight management was discussed. More of plant-based diet was recommended. Dizziness is likely related to mild hypovolemia when he sweats a lot working in the yard and hot weather. Continue present medications. Check home BP chart and if blood pressure is downtrending, may need to reduce maintenance blood pressure medications. Diagnoses and all orders for this visit:    1. Essential hypertension    2. Chest pain, unspecified type    3. Dyslipidemia        Pertinent laboratory and test data reviewed and discussed with patient. See patient instructions also for other medical advice given    There are no discontinued medications.     Follow-up and Dispositions    · Return in about 6 months (around 2/14/2021), or if symptoms worsen or fail to improve, for with ekg.

## 2020-08-14 NOTE — PROGRESS NOTES
1. Have you been to the ER, urgent care clinic since your last visit? Hospitalized since your last visit? No    2. Have you seen or consulted any other health care providers outside of the [address] [place] [address] since your last visit? Include any pap smears or colon screening. No     3. Since your last visit, have you had any of the following symptoms? shortness of breath and dizziness. Explain: Bilateral Hand Swelling    4. Have you had any blood work, X-rays or cardiac testing? No    5. Where do you normally have your labs drawn? [facility]    6. Do you need any refills today?    No

## 2020-08-14 NOTE — PATIENT INSTRUCTIONS
There are no discontinued medications. Drink plenty of water when you are sweating working in the yard outside. After the recommended changes have been made in blood pressure medicines, patient advised to keep BP/HR(pulse rate) chart twice daily and bring us results in next 1 week or so. Patient may send the results via \"My Chart\" if desired. Please rest for 5-10 minutes before checking blood pressure. Sit on a comfortable chair without crossing the legs and put your arm on a table. We recommend that you use an upper arm cuff. Check the blood pressure 3 times weekly and take the lowest reading. If you check the blood pressure in both arms, use the higher reading. A Healthy Heart: Care Instructions  Your Care Instructions     Coronary artery disease, also called heart disease, occurs when a substance called plaque builds up in the vessels that supply oxygen-rich blood to your heart muscle. This can narrow the blood vessels and reduce blood flow. A heart attack happens when blood flow is completely blocked. A high-fat diet, smoking, and other factors increase the risk of heart disease. Your doctor has found that you have a chance of having heart disease. You can do lots of things to keep your heart healthy. It may not be easy, but you can change your diet, exercise more, and quit smoking. These steps really work to lower your chance of heart disease. Follow-up care is a key part of your treatment and safety. Be sure to make and go to all appointments, and call your doctor if you are having problems. It's also a good idea to know your test results and keep a list of the medicines you take. How can you care for yourself at home? Diet  · Use less salt when you cook and eat. This helps lower your blood pressure. Taste food before salting. Add only a little salt when you think you need it. With time, your taste buds will adjust to less salt.   · Eat fewer snack items, fast foods, canned soups, and other high-salt, high-fat, processed foods. · Read food labels and try to avoid saturated and trans fats. They increase your risk of heart disease by raising cholesterol levels. · Limit the amount of solid fat-butter, margarine, and shortening-you eat. Use olive, peanut, or canola oil when you cook. Bake, broil, and steam foods instead of frying them. · Eat a variety of fruit and vegetables every day. Dark green, deep orange, red, or yellow fruits and vegetables are especially good for you. Examples include spinach, carrots, peaches, and berries. · Foods high in fiber can reduce your cholesterol and provide important vitamins and minerals. High-fiber foods include whole-grain cereals and breads, oatmeal, beans, brown rice, citrus fruits, and apples. · Eat lean proteins. Heart-healthy proteins include seafood, lean meats and poultry, eggs, beans, peas, nuts, seeds, and soy products. · Limit drinks and foods with added sugar. These include candy, desserts, and soda pop. Lifestyle changes  · If your doctor recommends it, get more exercise. Walking is a good choice. Bit by bit, increase the amount you walk every day. Try for at least 30 minutes on most days of the week. You also may want to swim, bike, or do other activities. · Do not smoke. If you need help quitting, talk to your doctor about stop-smoking programs and medicines. These can increase your chances of quitting for good. Quitting smoking may be the most important step you can take to protect your heart. It is never too late to quit. · Limit alcohol to 2 drinks a day for men and 1 drink a day for women. Too much alcohol can cause health problems. · Manage other health problems such as diabetes, high blood pressure, and high cholesterol. If you think you may have a problem with alcohol or drug use, talk to your doctor. Medicines  · Take your medicines exactly as prescribed. Call your doctor if you think you are having a problem with your medicine.   · If your doctor recommends aspirin, take the amount directed each day. Make sure you take aspirin and not another kind of pain reliever, such as acetaminophen (Tylenol). When should you call for help? [id number] if you have symptoms of a heart attack. These may include:  · Chest pain or pressure, or a strange feeling in the chest.  · Sweating. · Shortness of breath. · Pain, pressure, or a strange feeling in the back, neck, jaw, or upper belly or in one or both shoulders or arms. · Lightheadedness or sudden weakness. · A fast or irregular heartbeat. After you call 911, the  may tell you to chew 1 adult-strength or 2 to 4 low-dose aspirin. Wait for an ambulance. Do not try to drive yourself. Watch closely for changes in your health, and be sure to contact your doctor if you have any problems. Where can you learn more? Go to http://lavonne-frances.info/  Enter F075 in the search box to learn more about \"A Healthy Heart: Care Instructions. \"  Current as of: December 16, 2019               Content Version: 12.5  © 1091-0881 [employer]. Care instructions adapted under license by [employer] (which disclaims liability or warranty for this information). If you have questions about a medical condition or this instruction, always ask your healthcare professional. [address] any warranty or liability for your use of this information.

## 2020-09-03 DIAGNOSIS — F39 MOOD DISORDER (HCC): ICD-10-CM

## 2020-09-08 DIAGNOSIS — R14.3 FLATULENCE: ICD-10-CM

## 2020-09-08 RX ORDER — SERTRALINE HYDROCHLORIDE 50 MG/1
TABLET, FILM COATED ORAL
Qty: 135 TAB | Refills: 0 | Status: SHIPPED | OUTPATIENT
Start: 2020-09-08 | End: 2021-02-09 | Stop reason: SDUPTHER

## 2020-09-08 RX ORDER — SIMETHICONE 80 MG/1
TABLET, CHEWABLE ORAL
Qty: 120 TAB | Refills: 1 | Status: SHIPPED | OUTPATIENT
Start: 2020-09-08

## 2020-09-28 DIAGNOSIS — I10 ESSENTIAL HYPERTENSION: ICD-10-CM

## 2020-09-28 NOTE — TELEPHONE ENCOUNTER
Requested Prescriptions     Pending Prescriptions Disp Refills    hydrALAZINE (APRESOLINE) 50 mg tablet 270 Tab 0    amLODIPine (NORVASC) 10 mg tablet 90 Tab 0

## 2020-09-29 RX ORDER — HYDRALAZINE HYDROCHLORIDE 50 MG/1
TABLET, FILM COATED ORAL
Qty: 270 TAB | Refills: 0 | Status: SHIPPED | OUTPATIENT
Start: 2020-09-29 | End: 2020-10-06 | Stop reason: SDUPTHER

## 2020-09-29 RX ORDER — AMLODIPINE BESYLATE 10 MG/1
TABLET ORAL
Qty: 90 TAB | Refills: 0 | Status: SHIPPED | OUTPATIENT
Start: 2020-09-29 | End: 2020-10-06 | Stop reason: SDUPTHER

## 2020-10-06 ENCOUNTER — HOSPITAL ENCOUNTER (OUTPATIENT)
Dept: LAB | Age: 70
Discharge: HOME | End: 2020-10-06
Payer: MEDICAID

## 2020-10-06 ENCOUNTER — OFFICE VISIT (OUTPATIENT)
Dept: FAMILY MEDICINE CLINIC | Age: 70
End: 2020-10-06
Payer: MEDICARE

## 2020-10-06 VITALS
WEIGHT: 179 LBS | RESPIRATION RATE: 18 BRPM | SYSTOLIC BLOOD PRESSURE: 160 MMHG | DIASTOLIC BLOOD PRESSURE: 88 MMHG | HEART RATE: 75 BPM | OXYGEN SATURATION: 99 % | BODY MASS INDEX: 29.82 KG/M2 | TEMPERATURE: 98.5 F | HEIGHT: 65 IN

## 2020-10-06 DIAGNOSIS — E78.5 DYSLIPIDEMIA: ICD-10-CM

## 2020-10-06 DIAGNOSIS — K92.1 BLOOD IN STOOL: Primary | ICD-10-CM

## 2020-10-06 DIAGNOSIS — I10 ESSENTIAL HYPERTENSION: ICD-10-CM

## 2020-10-06 DIAGNOSIS — Z23 ENCOUNTER FOR IMMUNIZATION: ICD-10-CM

## 2020-10-06 DIAGNOSIS — R51.9 NONINTRACTABLE HEADACHE, UNSPECIFIED CHRONICITY PATTERN, UNSPECIFIED HEADACHE TYPE: ICD-10-CM

## 2020-10-06 DIAGNOSIS — F39 MOOD DISORDER (HCC): ICD-10-CM

## 2020-10-06 DIAGNOSIS — K59.00 CONSTIPATION, UNSPECIFIED CONSTIPATION TYPE: ICD-10-CM

## 2020-10-06 DIAGNOSIS — G89.29 CHRONIC PAIN OF RIGHT KNEE: ICD-10-CM

## 2020-10-06 DIAGNOSIS — M25.561 CHRONIC PAIN OF RIGHT KNEE: ICD-10-CM

## 2020-10-06 LAB
ALBUMIN SERPL-MCNC: 3.7 G/DL (ref 3.4–5)
ALBUMIN/GLOB SERPL: 0.9 {RATIO} (ref 0.8–1.7)
ALP SERPL-CCNC: 120 U/L (ref 45–117)
ALT SERPL-CCNC: 32 U/L (ref 16–61)
ANION GAP SERPL CALC-SCNC: 7 MMOL/L (ref 3–18)
AST SERPL-CCNC: 29 U/L (ref 10–38)
BASOPHILS # BLD: 0 K/UL (ref 0–0.1)
BASOPHILS NFR BLD: 0 % (ref 0–2)
BILIRUB SERPL-MCNC: 0.4 MG/DL (ref 0.2–1)
BUN SERPL-MCNC: 14 MG/DL (ref 7–18)
BUN/CREAT SERPL: 15 (ref 12–20)
CALCIUM SERPL-MCNC: 9.5 MG/DL (ref 8.5–10.1)
CHLORIDE SERPL-SCNC: 106 MMOL/L (ref 100–111)
CHOLEST SERPL-MCNC: 182 MG/DL
CO2 SERPL-SCNC: 27 MMOL/L (ref 21–32)
CREAT SERPL-MCNC: 0.96 MG/DL (ref 0.6–1.3)
DIFFERENTIAL METHOD BLD: ABNORMAL
EOSINOPHIL # BLD: 0.2 K/UL (ref 0–0.4)
EOSINOPHIL NFR BLD: 6 % (ref 0–5)
ERYTHROCYTE [DISTWIDTH] IN BLOOD BY AUTOMATED COUNT: 14.8 % (ref 11.6–14.5)
GLOBULIN SER CALC-MCNC: 4 G/DL (ref 2–4)
GLUCOSE SERPL-MCNC: 95 MG/DL (ref 74–99)
HCT VFR BLD AUTO: 40.5 % (ref 36–48)
HDLC SERPL-MCNC: 56 MG/DL (ref 40–60)
HDLC SERPL: 3.3 {RATIO} (ref 0–5)
HGB BLD-MCNC: 13.7 G/DL (ref 13–16)
LDLC SERPL CALC-MCNC: 101 MG/DL (ref 0–100)
LIPID PROFILE,FLP: ABNORMAL
LYMPHOCYTES # BLD: 1.6 K/UL (ref 0.9–3.6)
LYMPHOCYTES NFR BLD: 42 % (ref 21–52)
MCH RBC QN AUTO: 30.6 PG (ref 24–34)
MCHC RBC AUTO-ENTMCNC: 33.8 G/DL (ref 31–37)
MCV RBC AUTO: 90.4 FL (ref 74–97)
MONOCYTES # BLD: 0.3 K/UL (ref 0.05–1.2)
MONOCYTES NFR BLD: 8 % (ref 3–10)
NEUTS SEG # BLD: 1.7 K/UL (ref 1.8–8)
NEUTS SEG NFR BLD: 44 % (ref 40–73)
PLATELET # BLD AUTO: 256 K/UL (ref 135–420)
PMV BLD AUTO: 10.3 FL (ref 9.2–11.8)
POTASSIUM SERPL-SCNC: 4.8 MMOL/L (ref 3.5–5.5)
PROT SERPL-MCNC: 7.7 G/DL (ref 6.4–8.2)
RBC # BLD AUTO: 4.48 M/UL (ref 4.7–5.5)
SODIUM SERPL-SCNC: 140 MMOL/L (ref 136–145)
TRIGL SERPL-MCNC: 125 MG/DL (ref ?–150)
VLDLC SERPL CALC-MCNC: 25 MG/DL
WBC # BLD AUTO: 3.8 K/UL (ref 4.6–13.2)

## 2020-10-06 PROCEDURE — G8754 DIAS BP LESS 90: HCPCS | Performed by: FAMILY MEDICINE

## 2020-10-06 PROCEDURE — G8432 DEP SCR NOT DOC, RNG: HCPCS | Performed by: FAMILY MEDICINE

## 2020-10-06 PROCEDURE — 99214 OFFICE O/P EST MOD 30 MIN: CPT | Performed by: FAMILY MEDICINE

## 2020-10-06 PROCEDURE — 3017F COLORECTAL CA SCREEN DOC REV: CPT | Performed by: FAMILY MEDICINE

## 2020-10-06 PROCEDURE — G8419 CALC BMI OUT NRM PARAM NOF/U: HCPCS | Performed by: FAMILY MEDICINE

## 2020-10-06 PROCEDURE — G8753 SYS BP > OR = 140: HCPCS | Performed by: FAMILY MEDICINE

## 2020-10-06 PROCEDURE — 36415 COLL VENOUS BLD VENIPUNCTURE: CPT | Performed by: FAMILY MEDICINE

## 2020-10-06 PROCEDURE — G0008 ADMIN INFLUENZA VIRUS VAC: HCPCS | Performed by: FAMILY MEDICINE

## 2020-10-06 PROCEDURE — 1101F PT FALLS ASSESS-DOCD LE1/YR: CPT | Performed by: FAMILY MEDICINE

## 2020-10-06 PROCEDURE — 80053 COMPREHEN METABOLIC PANEL: CPT

## 2020-10-06 PROCEDURE — 80061 LIPID PANEL: CPT

## 2020-10-06 PROCEDURE — 36415 COLL VENOUS BLD VENIPUNCTURE: CPT

## 2020-10-06 PROCEDURE — 90694 VACC AIIV4 NO PRSRV 0.5ML IM: CPT | Performed by: FAMILY MEDICINE

## 2020-10-06 PROCEDURE — G8427 DOCREV CUR MEDS BY ELIG CLIN: HCPCS | Performed by: FAMILY MEDICINE

## 2020-10-06 PROCEDURE — G8536 NO DOC ELDER MAL SCRN: HCPCS | Performed by: FAMILY MEDICINE

## 2020-10-06 PROCEDURE — 85025 COMPLETE CBC W/AUTO DIFF WBC: CPT

## 2020-10-06 RX ORDER — DOCUSATE SODIUM 100 MG/1
100 CAPSULE, LIQUID FILLED ORAL 2 TIMES DAILY
Qty: 60 CAP | Refills: 2 | Status: SHIPPED | OUTPATIENT
Start: 2020-10-06

## 2020-10-06 RX ORDER — HYDROCORTISONE 25 MG/G
CREAM TOPICAL 4 TIMES DAILY
Qty: 30 G | Refills: 0 | Status: SHIPPED | OUTPATIENT
Start: 2020-10-06 | End: 2020-11-16 | Stop reason: SDUPTHER

## 2020-10-06 RX ORDER — LOSARTAN POTASSIUM 100 MG/1
TABLET ORAL
Qty: 90 TAB | Refills: 1 | Status: SHIPPED | OUTPATIENT
Start: 2020-10-06 | End: 2021-07-12

## 2020-10-06 RX ORDER — AMLODIPINE BESYLATE 10 MG/1
TABLET ORAL
Qty: 90 TAB | Refills: 1 | Status: SHIPPED | OUTPATIENT
Start: 2020-10-06 | End: 2021-04-08 | Stop reason: SDUPTHER

## 2020-10-06 RX ORDER — METOPROLOL SUCCINATE 50 MG/1
TABLET, EXTENDED RELEASE ORAL
Qty: 90 TAB | Refills: 1 | Status: SHIPPED | OUTPATIENT
Start: 2020-10-06 | End: 2021-07-19

## 2020-10-06 RX ORDER — HYDRALAZINE HYDROCHLORIDE 50 MG/1
TABLET, FILM COATED ORAL
Qty: 270 TAB | Refills: 1 | Status: SHIPPED | OUTPATIENT
Start: 2020-10-06 | End: 2020-10-27 | Stop reason: DRUGHIGH

## 2020-10-06 RX ORDER — HYDROCHLOROTHIAZIDE 25 MG/1
TABLET ORAL
Qty: 90 TAB | Refills: 1 | Status: SHIPPED | OUTPATIENT
Start: 2020-10-06 | End: 2022-10-20 | Stop reason: ALTCHOICE

## 2020-10-06 NOTE — PROGRESS NOTES
Chief Complaint   Patient presents with    Melena    Constipation     1. Have you been to the ER, urgent care clinic since your last visit? Hospitalized since your last visit? No    2. Have you seen or consulted any other health care providers outside of the [address] [place] [address] since your last visit? Include any pap smears or colon screening. No     HPI  [facility]. comes in for f/u care. Blood in stool: Patient has noticed blood in stool. Noticed this over the past 2 days. States that he strains when he is going to have a bowel movement.)  Blood in the stool. There was also pain on defecation. He denies noticing any mass around his perianal area. He is constipated. I will have patient take docusate for constipation and also increase fiber in his diet. I will give Anusol rectal cream to apply. I will refer him to the colon and rectal specialist for evaluation and management. May need to have endoscopic studies done. We will check his labs today. Hypertension: Patient's blood pressure is elevated. He has been off his medication for some days. I will send in a refill of his medications. I will have him come back in for blood pressure check in 3 weeks. He is on Toprol-XL, losartan, HCTZ, hydralazine and amlodipine. He denies headache, changes in vision or focal weakness. Mood disorder: Patient has history of mood disorder with anxiety, irritability and anger issues. These have been well controlled on sertraline. He should continue with this medication. He also has a history of depression but this is stable. Dyslipidemia: Patient has dyslipidemia. He is on Lipitor 40 mg daily. Tolerating the medication. We will continue with this medication. Allergies: Patient has nasal congestion and sneezing. He is on Flonase nasal spray and Zyrtec. Continue current treatment plan. GERD: Patient has heartburn on and off. Denies hematemesis. Denies epigastric pain and discomfort.   He is on omeprazole 20 mg daily. Continue current treatment plan. Patient also has some gaseous dyspepsia. He takes simethicone as needed. Health maintenance: Patient will get the flu vaccine given today. Past Medical History  Past Medical History:   Diagnosis Date    BPH (benign prostatic hyperplasia)     Depression     Hyperlipidemia     Hypertension     Irritability and anger     Syncope 2017    no injuries       Surgical History  Past Surgical History:   Procedure Laterality Date    COLONOSCOPY N/A 7/19/2017    COLONOSCOPY / polypectomy performed by Jolayne Ormond, MD at [facility] END[facility]COPY    HX OTHER SURGICAL  1993    plate placed in head due to injury    [place] COLSC FLX W/REMOVAL LESION BY HOT BX FORCEPS N/A 07/19/2017    Dr. Raymon Montez        Medications  Current Outpatient Medications   Medication Sig Dispense Refill    hydrALAZINE (APRESOLINE) 50 mg tablet TAKE 1 TABLET BY MOUTH THREE TIMES DAILY 270 Tab 0    amLODIPine (NORVASC) 10 mg tablet TAKE 1 TABLET BY MOUTH DAILY FOR BLOOD PRESSURE 90 Tab 0    sertraline (ZOLOFT) 50 mg tablet TAKE 1 AND 1/2 TABLETS BY MOUTH DAILY 135 Tab 0    Mi-Acid Gas Relief,simethicon, 80 mg chewable tablet CHEW AND SWALLOW ONE TABLET EVERY 6 HOURS AS NEEDED 120 Tab 1    clotrimazole-betamethasone (LOTRISONE) topical cream Apply thin layer 2 x day 45 g 2    butalbital-acetaminophen-caff (FIORICET) -40 mg per capsule Take 1 Cap by mouth every six (6) hours as needed for Headache. 60 Cap 2    omeprazole (PRILOSEC) 20 mg capsule TAKE ONE CAPSULE BY MOUTH DAILY 90 Cap 0    atorvastatin (LIPITOR) 40 mg tablet TAKE 1 TABLET BY MOUTH DAILY 90 Tab 0    losartan (COZAAR) 100 mg tablet TAKE 1 TABLET BY MOUTH DAILY FOR HIGH BLOOD PRESSURE 90 Tab 0    cetirizine (ZYRTEC) 10 mg tablet Take 1 Tab by mouth daily.  (Patient taking differently: Take 10 mg by mouth daily as needed.) 30 Tab 2    fluticasone propionate (FLONASE) 50 mcg/actuation nasal spray SHAKE LIQUID AND USE 2 SPRAYS IN EACH NOSTRIL DAILY (Patient taking differently: by Both Nostrils route as needed.) 1 Bottle 1    hydroCHLOROthiazide (HYDRODIURIL) 25 mg tablet TAKE 1 TABLET BY MOUTH DAILY 60 Tab 0    metoprolol succinate (TOPROL-XL) 50 mg XL tablet TAKE 1 TABLET BY MOUTH DAILY 90 Tab 3       Allergies  No Known Allergies    Family History  Family History   Problem Relation Age of Onset    Diabetes Mother     Heart Disease Father     Diabetes Father     Heart Attack Neg Hx     Stroke Neg Hx     Sudden Death Neg Hx        Social History  Social History     Socioeconomic History    Marital status: SINGLE     Spouse name: Not on file    Number of children: Not on file    Years of education: Not on file    Highest education level: Not on file   Occupational History    Not on file   Social Needs    Financial resource strain: Not on file    Food insecurity     Worry: Not on file     Inability: Not on file    Transportation needs     Medical: Not on file     Non-medical: Not on file   Tobacco Use    Smoking status: Never Smoker    Smokeless tobacco: Never Used   Substance and Sexual Activity    Alcohol use: No    Drug use: No    Sexual activity: Yes     Partners: Female   Lifestyle    Physical activity     Days per week: Not on file     Minutes per session: Not on file    Stress: Not on file   Relationships    Social connections     Talks on phone: Not on file     Gets together: Not on file     Attends Restorationist service: Not on file     Active member of club or organization: Not on file     Attends meetings of clubs or organizations: Not on file     Relationship status: Not on file    Intimate partner violence     Fear of current or ex partner: Not on file     Emotionally abused: Not on file     Physically abused: Not on file     Forced sexual activity: Not on file   Other Topics Concern    Not on file   Social History Narrative    Not on file       Review of Systems  Review of Systems - Review of all systems is negative except as noted above in the HPI. Vital Signs  Visit Vitals  BP (!) 160/88 (BP 1 Location: Left arm, BP Patient Position: Sitting)   Pulse 75   Temp 98.5 °F (36.9 °C) (Oral)   Resp 18   Ht 5' 5\" (1.651 m)   Wt 179 lb (81.2 kg)   SpO2 99%   BMI 29.79 kg/m²         Physical Exam  Physical Examination: General appearance - oriented to person, place, and time, overweight, acyanotic, in no respiratory distress and well hydrated  Mental status - alert, oriented to person, place, and time, affect appropriate to mood  Neck - supple, no significant adenopathy  Lymphatics - no palpable lymphadenopathy  Chest - no tachypnea, retractions or cyanosis, decreased air entry noted bilateral lung bases  Heart - S1 and S2 normal  Abdomen - no rebound tenderness noted  Back exam - limited range of motion  Neurological - abnormal neurological exam unchanged from prior examinations  Musculoskeletal - osteoarthritic changes noted in both hands  Extremities - intact peripheral pulses      Results  Results for orders placed or performed in visit on 10/14/19   EXERCISE CARDIAC STRESS TEST   Result Value Ref Range    Target  bpm    ST Elevation (mm) 0 mm    ST Depression (mm) 0 mm    Angina Index 0     STRESS SR COREY TREADMILL SCORE 6     Baseline HR 58 bpm    Baseline  mmHg    Stress Base Diastolic BP 84 mmHg    Post peak  bpm    Percent HR 72 %    Stress Base Systolic  mmHg    Stress Rate Pressure Product 18,360 bpm*mmHg    Stress Base Diastolic BP 90 mmHg    Exercise duration time 6:55 min:sec    Estimated workload 9.8 METS    Stress Stage 1 HR 97 bpm    Stress Stage 1 /86 mmHg    Stress Stage 2  bpm    Stress Stage 2 /90 mmHg    Recovery Stage 1 HR 85 bpm    Recovery Stage 1 /90 mmHg    Recovery Stage 2 HR 67 bpm    Recovery Stage 2 /94 mmHg       ASSESSMENT and PLAN    ICD-10-CM ICD-9-CM    1.  Blood in stool  K92.1 578.1 REFERRAL TO COLON AND RECTAL SURGERY hydrocortisone (ANUSOL-HC) 2.5 % rectal cream   2. Constipation, unspecified constipation type  K59.00 564.00 docusate sodium (COLACE) 100 mg capsule   3. Essential hypertension  I10 401.9 CBC WITH AUTOMATED DIFF      METABOLIC PANEL, COMPREHENSIVE      hydrALAZINE (APRESOLINE) 50 mg tablet      hydroCHLOROthiazide (HYDRODIURIL) 25 mg tablet      losartan (COZAAR) 100 mg tablet      metoprolol succinate (TOPROL-XL) 50 mg XL tablet      COLLECTION VENOUS BLOOD,VENIPUNCTURE   4. Mood disorder (HCC)  F39 296.90    5. Nonintractable headache, unspecified chronicity pattern, unspecified headache type  R51.9 784.0    6. Dyslipidemia  E78.5 272.4 LIPID PANEL      COLLECTION VENOUS BLOOD,VENIPUNCTURE   7. Encounter for immunization  Z23 V03.89 FLU (FLUAD QUAD INFLUENZA VACCINE,QUAD,ADJUVANTED)   8. Chronic pain of right knee  M25.561 719.46     G89.29 338.29      lab results and schedule of future lab studies reviewed with patient  reviewed diet, exercise and weight control  cardiovascular risk and specific lipid/LDL goals reviewed  reviewed medications and side effects in detail      I have discussed the diagnosis with the patient and the intended plan of care as seen in the above orders. The patient has received an after-visit summary and questions were answered concerning future plans. I have discussed medication, side effects, and warnings with the patient in detail. The patient verbalized understanding and is in agreement with the plan of care. The patient will contact the office with any additional concerns. I spent at least 53 minutes on this visit with this established patient. Joi Rojas MD    PLEASE NOTE:   This document has been produced using voice recognition software.  Unrecognized errors in transcription may be present

## 2020-10-27 ENCOUNTER — OFFICE VISIT (OUTPATIENT)
Dept: FAMILY MEDICINE CLINIC | Age: 70
End: 2020-10-27
Payer: MEDICARE

## 2020-10-27 VITALS
DIASTOLIC BLOOD PRESSURE: 89 MMHG | SYSTOLIC BLOOD PRESSURE: 151 MMHG | HEART RATE: 65 BPM | TEMPERATURE: 99.1 F | RESPIRATION RATE: 18 BRPM | OXYGEN SATURATION: 97 % | BODY MASS INDEX: 30.32 KG/M2 | WEIGHT: 182 LBS | HEIGHT: 65 IN

## 2020-10-27 DIAGNOSIS — E78.5 DYSLIPIDEMIA: ICD-10-CM

## 2020-10-27 DIAGNOSIS — E66.9 OBESITY (BMI 30-39.9): ICD-10-CM

## 2020-10-27 DIAGNOSIS — F39 MOOD DISORDER (HCC): ICD-10-CM

## 2020-10-27 DIAGNOSIS — K21.9 GASTROESOPHAGEAL REFLUX DISEASE, UNSPECIFIED WHETHER ESOPHAGITIS PRESENT: ICD-10-CM

## 2020-10-27 DIAGNOSIS — R07.9 CHEST PAIN, UNSPECIFIED TYPE: Primary | ICD-10-CM

## 2020-10-27 DIAGNOSIS — R51.9 NONINTRACTABLE HEADACHE, UNSPECIFIED CHRONICITY PATTERN, UNSPECIFIED HEADACHE TYPE: ICD-10-CM

## 2020-10-27 DIAGNOSIS — R21 RASH AND NONSPECIFIC SKIN ERUPTION: ICD-10-CM

## 2020-10-27 DIAGNOSIS — R10.13 EPIGASTRIC PAIN: ICD-10-CM

## 2020-10-27 DIAGNOSIS — I10 ESSENTIAL HYPERTENSION: ICD-10-CM

## 2020-10-27 DIAGNOSIS — K92.1 BLOOD IN STOOL: ICD-10-CM

## 2020-10-27 PROCEDURE — G8536 NO DOC ELDER MAL SCRN: HCPCS | Performed by: FAMILY MEDICINE

## 2020-10-27 PROCEDURE — G8753 SYS BP > OR = 140: HCPCS | Performed by: FAMILY MEDICINE

## 2020-10-27 PROCEDURE — G8432 DEP SCR NOT DOC, RNG: HCPCS | Performed by: FAMILY MEDICINE

## 2020-10-27 PROCEDURE — G8427 DOCREV CUR MEDS BY ELIG CLIN: HCPCS | Performed by: FAMILY MEDICINE

## 2020-10-27 PROCEDURE — 3017F COLORECTAL CA SCREEN DOC REV: CPT | Performed by: FAMILY MEDICINE

## 2020-10-27 PROCEDURE — G8417 CALC BMI ABV UP PARAM F/U: HCPCS | Performed by: FAMILY MEDICINE

## 2020-10-27 PROCEDURE — 1101F PT FALLS ASSESS-DOCD LE1/YR: CPT | Performed by: FAMILY MEDICINE

## 2020-10-27 PROCEDURE — G8754 DIAS BP LESS 90: HCPCS | Performed by: FAMILY MEDICINE

## 2020-10-27 PROCEDURE — 99215 OFFICE O/P EST HI 40 MIN: CPT | Performed by: FAMILY MEDICINE

## 2020-10-27 PROCEDURE — 93000 ELECTROCARDIOGRAM COMPLETE: CPT | Performed by: FAMILY MEDICINE

## 2020-10-27 RX ORDER — HYDRALAZINE HYDROCHLORIDE 100 MG/1
100 TABLET, FILM COATED ORAL 3 TIMES DAILY
Qty: 90 TAB | Refills: 1 | Status: SHIPPED | OUTPATIENT
Start: 2020-10-27 | End: 2021-01-13

## 2020-10-27 RX ORDER — BUTALBITAL, ACETAMINOPHEN AND CAFFEINE 300; 40; 50 MG/1; MG/1; MG/1
1 CAPSULE ORAL
Qty: 60 CAP | Refills: 2 | Status: SHIPPED | OUTPATIENT
Start: 2020-10-27 | End: 2020-11-03 | Stop reason: SDUPTHER

## 2020-10-27 RX ORDER — PANTOPRAZOLE SODIUM 40 MG/1
40 TABLET, DELAYED RELEASE ORAL DAILY
Qty: 90 TAB | Refills: 1 | Status: SHIPPED | OUTPATIENT
Start: 2020-10-27 | End: 2022-06-20 | Stop reason: SDUPTHER

## 2020-10-27 NOTE — PROGRESS NOTES
Chief Complaint   Patient presents with    Hypertension    Heartburn     burning in chest on left side of chest      1. Have you been to the ER, urgent care clinic since your last visit? Hospitalized since your last visit? No    2. Have you seen or consulted any other health care providers outside of the [address] [place] [address] since your last visit? Include any pap smears or colon screening. No     HPI  [employer]. comes in for f/u care. GERD: Patient has acid reflux and heartburn that has been persistent over days. It comes at least daily. He denies hematemesis. He has epigastric pain that radiates in the retrosternal area. He has been on Prilosec 20 mg but this has not helped much. I will change to Protonix 40 mg daily. I will refer him to the gastroenterologist for possible EGD. Blood in stool: Patient has noticed blood in stool. He has had a colonoscopy done in the past and was noted to have hemorrhoids. He gets the stool with suleiman blood. This is likely hemorrhoids. I will place a referral to gastroenterologist.  Chest pain: Patient has left-sided chest pain. He denies shortness of breath but occasionally has diaphoresis. Was recently seen by the cardiologist.  We did an EKG that is negative for any ST elevations or depressions. I will place a referral for him to be seen by the cardiologist.  HTN: Patient has hypertension. His blood pressure is slightly elevated today. I will increase his hydralazine to 100 mg 3 times a day. He is on Norvasc, losartan, HCTZ and metoprolol XL. He denies changes in vision or focal weakness. We will continue with the current treatment plan. Mood disorder: Patient has a history of acute stress disorder, PTSD, mood disorder with depression. He takes Zoloft 50 mg daily. This has helped with his mood. We will continue with the current treatment plan. Headache: Patient has headache. Has been getting these headaches on and off lately.   He uses Fioricet and this helps with the headache. He would like a refill of medication. He denies focal weakness or changes in vision. Prescription will be sent into the pharmacy. Dyslipidemia: Patient has dyslipidemia. He is on Lipitor. We will continue with the current treatment plan. Obesity: Patient has obesity. BMI 30.29. He will intensify lifestyle and dietary modification. Past Medical History  Past Medical History:   Diagnosis Date    BPH (benign prostatic hyperplasia)     Depression     Hyperlipidemia     Hypertension     Irritability and anger     Syncope 2017    no injuries       Surgical History  Past Surgical History:   Procedure Laterality Date    COLONOSCOPY N/A 7/19/2017    COLONOSCOPY / polypectomy performed by Hilary Pimentel MD at [facility] ENDOSCOPY    HX OTHER SURGICAL  1993    plate placed in head due to injury    AR COLSC FLX W/REMOVAL LESION BY HOT BX FORCEPS N/A 07/19/2017    Dr. Traci Lorenz        Medications  Current Outpatient Medications   Medication Sig Dispense Refill    docusate sodium (COLACE) 100 mg capsule Take 1 Cap by mouth two (2) times a day. 60 Cap 2    hydrocortisone (ANUSOL-HC) 2.5 % rectal cream Insert  into rectum four (4) times daily.  30 g 0    amLODIPine (NORVASC) 10 mg tablet TAKE 1 TABLET BY MOUTH DAILY FOR BLOOD PRESSURE 90 Tab 1    hydrALAZINE (APRESOLINE) 50 mg tablet TAKE 1 TABLET BY MOUTH THREE TIMES DAILY 270 Tab 1    hydroCHLOROthiazide (HYDRODIURIL) 25 mg tablet TAKE 1 TABLET BY MOUTH DAILY 90 Tab 1    losartan (COZAAR) 100 mg tablet TAKE 1 TABLET BY MOUTH DAILY FOR HIGH BLOOD PRESSURE 90 Tab 1    metoprolol succinate (TOPROL-XL) 50 mg XL tablet TAKE 1 TABLET BY MOUTH DAILY 90 Tab 1    sertraline (ZOLOFT) 50 mg tablet TAKE 1 AND 1/2 TABLETS BY MOUTH DAILY 135 Tab 0    Mi-Acid Gas Relief,simethicon, 80 mg chewable tablet CHEW AND SWALLOW ONE TABLET EVERY 6 HOURS AS NEEDED 120 Tab 1    butalbital-acetaminophen-caff (FIORICET) -40 mg per capsule Take 1 Cap by mouth every six (6) hours as needed for Headache. 60 Cap 2    cetirizine (ZYRTEC) 10 mg tablet Take 1 Tab by mouth daily.  (Patient taking differently: Take 10 mg by mouth daily as needed.) 30 Tab 2    fluticasone propionate (FLONASE) 50 mcg/actuation nasal spray SHAKE LIQUID AND USE 2 SPRAYS IN EACH NOSTRIL DAILY (Patient taking differently: by Both Nostrils route as needed.) 1 Bottle 1    omeprazole (PRILOSEC) 20 mg capsule TAKE ONE CAPSULE BY MOUTH DAILY 90 Cap 0    atorvastatin (LIPITOR) 40 mg tablet TAKE 1 TABLET BY MOUTH DAILY 90 Tab 0       Allergies  No Known Allergies    Family History  Family History   Problem Relation Age of Onset    Diabetes Mother     Heart Disease Father     Diabetes Father     Heart Attack Neg Hx     Stroke Neg Hx     Sudden Death Neg Hx        Social History  Social History     Socioeconomic History    Marital status: SINGLE     Spouse name: Not on file    Number of children: Not on file    Years of education: Not on file    Highest education level: Not on file   Occupational History    Not on file   Social Needs    Financial resource strain: Not on file    Food insecurity     Worry: Not on file     Inability: Not on file    Transportation needs     Medical: Not on file     Non-medical: Not on file   Tobacco Use    Smoking status: Never Smoker    Smokeless tobacco: Never Used   Substance and Sexual Activity    Alcohol use: No    Drug use: No    Sexual activity: Yes     Partners: Female   Lifestyle    Physical activity     Days per week: Not on file     Minutes per session: Not on file    Stress: Not on file   Relationships    Social connections     Talks on phone: Not on file     Gets together: Not on file     Attends Yarsanism service: Not on file     Active member of club or organization: Not on file     Attends meetings of clubs or organizations: Not on file     Relationship status: Not on file    Intimate partner violence     Fear of current or ex partner: Not on file     Emotionally abused: Not on file     Physically abused: Not on file     Forced sexual activity: Not on file   Other Topics Concern    Not on file   Social History Narrative    Not on file       Review of Systems  Review of Systems - Review of all systems is negative except as noted above in the HPI.     Vital Signs  Visit Vitals  BP (!) 151/89 (BP 1 Location: Left arm, BP Patient Position: Sitting)   Pulse 65   Temp 99.1 °F (37.3 °C) (Oral)   Resp 18   Ht 5' 5\" (1.651 m)   Wt 182 lb (82.6 kg)   SpO2 97%   BMI 30.29 kg/m²     Physical Exam  Physical Examination: General appearance - alert, well appearing, and in no distress, oriented to person, place, and time and acyanotic, in no respiratory distress  Mental status - alert, oriented to person, place, and time, affect appropriate to mood  Mouth - mucous membranes moist, pharynx normal without lesions  Neck - supple, no significant adenopathy  Lymphatics - no palpable lymphadenopathy  Chest - clear to auscultation, no wheezes, rales or rhonchi, symmetric air entry, no tachypnea, retractions or cyanosis  Heart - S1 and S2 normal  Abdomen - no rebound tenderness noted  Back exam - limited range of motion  Neurological - abnormal neurological exam unchanged from prior examinations  Musculoskeletal - osteoarthritic changes noted in both hands  Extremities - intact peripheral pulses      Results  Results for orders placed or performed during the hospital encounter of 10/06/20   CBC WITH AUTOMATED DIFF   Result Value Ref Range    WBC 3.8 (L) 4.6 - 13.2 K/uL    RBC 4.48 (L) 4.70 - 5.50 M/uL    HGB 13.7 13.0 - 16.0 g/dL    HCT 40.5 36.0 - 48.0 %    MCV 90.4 74.0 - 97.0 FL    MCH 30.6 24.0 - 34.0 PG    MCHC 33.8 31.0 - 37.0 g/dL    RDW 14.8 (H) 11.6 - 14.5 %    PLATELET 516 062 - 618 K/uL    MPV 10.3 9.2 - 11.8 FL    NEUTROPHILS 44 40 - 73 %    LYMPHOCYTES 42 21 - 52 %    MONOCYTES 8 3 - 10 %    EOSINOPHILS 6 (H) 0 - 5 %    BASOPHILS 0 0 - 2 %    ABS. NEUTROPHILS 1.7 (L) 1.8 - 8.0 K/UL    ABS. LYMPHOCYTES 1.6 0.9 - 3.6 K/UL    ABS. MONOCYTES 0.3 0.05 - 1.2 K/UL    ABS. EOSINOPHILS 0.2 0.0 - 0.4 K/UL    ABS. BASOPHILS 0.0 0.0 - 0.1 K/UL    DF AUTOMATED     METABOLIC PANEL, COMPREHENSIVE   Result Value Ref Range    Sodium 140 136 - 145 mmol/L    Potassium 4.8 3.5 - 5.5 mmol/L    Chloride 106 100 - 111 mmol/L    CO2 27 21 - 32 mmol/L    Anion gap 7 3.0 - 18 mmol/L    Glucose 95 74 - 99 mg/dL    BUN 14 7.0 - 18 MG/DL    Creatinine 0.96 0.6 - 1.3 MG/DL    BUN/Creatinine ratio 15 12 - 20      GFR est AA >60 >60 ml/min/1.73m2    GFR est non-AA >60 >60 ml/min/1.73m2    Calcium 9.5 8.5 - 10.1 MG/DL    Bilirubin, total 0.4 0.2 - 1.0 MG/DL    ALT (SGPT) 32 16 - 61 U/L    AST (SGOT) 29 10 - 38 U/L    Alk. phosphatase 120 (H) 45 - 117 U/L    Protein, total 7.7 6.4 - 8.2 g/dL    Albumin 3.7 3.4 - 5.0 g/dL    Globulin 4.0 2.0 - 4.0 g/dL    A-G Ratio 0.9 0.8 - 1.7     LIPID PANEL   Result Value Ref Range    LIPID PROFILE          Cholesterol, total 182 <200 MG/DL    Triglyceride 125 <150 MG/DL    HDL Cholesterol 56 40 - 60 MG/DL    LDL, calculated 101 (H) 0 - 100 MG/DL    VLDL, calculated 25 MG/DL    CHOL/HDL Ratio 3.3 0 - 5.0         ASSESSMENT and PLAN    ICD-10-CM ICD-9-CM    1. Chest pain, unspecified type  R07.9 786.50 AMB POC EKG ROUTINE W/ 12 LEADS, INTER & REP   2. Essential hypertension  I10 401.9 hydrALAZINE (APRESOLINE) 100 mg tablet   3. Gastroesophageal reflux disease, unspecified whether esophagitis present  K21.9 530.81 REFERRAL TO GASTROENTEROLOGY      pantoprazole (PROTONIX) 40 mg tablet   4. Epigastric pain  R10.13 789.06 REFERRAL TO GASTROENTEROLOGY      AMB POC EKG ROUTINE W/ 12 LEADS, INTER & REP      pantoprazole (PROTONIX) 40 mg tablet   5. Blood in stool  K92.1 578.1    6. Mood disorder (HCC)  F39 296.90    7. Nonintractable headache, unspecified chronicity pattern, unspecified headache type  R51.9 784.0    8.  Dyslipidemia  E78.5 272.4 9. Obesity (BMI 30-39. 9)  E66.9 278.00    10. Rash and nonspecific skin eruption  R21 782.1 butalbital-acetaminophen-caff (FIORICET) -40 mg per capsule     lab results and schedule of future lab studies reviewed with patient  reviewed diet, exercise and weight control  cardiovascular risk and specific lipid/LDL goals reviewed  reviewed medications and side effects in detail  I explained 55 minutes with this established patient. I have discussed the diagnosis with the patient and the intended plan of care as seen in the above orders. The patient has received an after-visit summary and questions were answered concerning future plans. I have discussed medication, side effects, and warnings with the patient in detail. The patient verbalized understanding and is in agreement with the plan of care. The patient will contact the office with any additional concerns. Aracelis Galarza MD    PLEASE NOTE:   This document has been produced using voice recognition software.  Unrecognized errors in transcription may be present

## 2020-11-03 DIAGNOSIS — R21 RASH AND NONSPECIFIC SKIN ERUPTION: ICD-10-CM

## 2020-11-03 RX ORDER — BUTALBITAL, ACETAMINOPHEN AND CAFFEINE 300; 40; 50 MG/1; MG/1; MG/1
1 CAPSULE ORAL
Qty: 60 CAP | Refills: 2 | Status: SHIPPED | OUTPATIENT
Start: 2020-11-03 | End: 2022-06-20 | Stop reason: SDUPTHER

## 2020-11-13 ENCOUNTER — TELEPHONE (OUTPATIENT)
Dept: FAMILY MEDICINE CLINIC | Age: 70
End: 2020-11-13

## 2020-11-13 NOTE — TELEPHONE ENCOUNTER
Pt stated the headache medication that Dr Maykel Kuhn sent to the pharmacy is not covered by his insurance and he is also requesting a medication cream that Dr Maykel Kuhn was suppose to send to the pharmacy. Please follow up with this patient when available to discuss.

## 2020-11-16 DIAGNOSIS — K92.1 BLOOD IN STOOL: ICD-10-CM

## 2020-11-16 DIAGNOSIS — R51.9 NONINTRACTABLE HEADACHE, UNSPECIFIED CHRONICITY PATTERN, UNSPECIFIED HEADACHE TYPE: Primary | ICD-10-CM

## 2020-11-16 RX ORDER — HYDROCORTISONE 25 MG/G
CREAM TOPICAL 4 TIMES DAILY
Qty: 30 G | Refills: 0 | Status: SHIPPED | OUTPATIENT
Start: 2020-11-16

## 2020-11-16 RX ORDER — SUMATRIPTAN 50 MG/1
TABLET, FILM COATED ORAL
Qty: 30 TAB | Refills: 1 | Status: SHIPPED | OUTPATIENT
Start: 2020-11-16 | End: 2022-10-20 | Stop reason: SDUPTHER

## 2020-11-25 ENCOUNTER — TELEPHONE (OUTPATIENT)
Dept: FAMILY MEDICINE CLINIC | Age: 70
End: 2020-11-25

## 2020-11-25 NOTE — TELEPHONE ENCOUNTER
Patient's daughter called and advised that tthe patient is still having headaches-he was unable to  the Imitrex because insurance would not pay for it. Will continue the fiorcet when needed but would like something else sent in.

## 2020-11-30 DIAGNOSIS — R51.9 NONINTRACTABLE HEADACHE, UNSPECIFIED CHRONICITY PATTERN, UNSPECIFIED HEADACHE TYPE: Primary | ICD-10-CM

## 2020-11-30 NOTE — TELEPHONE ENCOUNTER
Patient has been referred to see the neurologist.  I will order a recheck head CT scan given the worsening headaches. If it becomes intractable needs to go to the emergency room. I will send in some Excedrin Migraine to take.   Debora Natarajan MD

## 2020-11-30 NOTE — TELEPHONE ENCOUNTER
Left message on Ms. Olson's phone that referral had been placed and new migraine medication was sent in

## 2021-01-12 DIAGNOSIS — I10 ESSENTIAL HYPERTENSION: ICD-10-CM

## 2021-01-13 RX ORDER — HYDRALAZINE HYDROCHLORIDE 100 MG/1
TABLET, FILM COATED ORAL
Qty: 270 TAB | Refills: 0 | Status: SHIPPED | OUTPATIENT
Start: 2021-01-13

## 2021-02-09 DIAGNOSIS — F39 MOOD DISORDER (HCC): ICD-10-CM

## 2021-02-09 NOTE — TELEPHONE ENCOUNTER
Requested Prescriptions     Pending Prescriptions Disp Refills    sertraline (ZOLOFT) 50 mg tablet 135 Tab 0

## 2021-02-10 RX ORDER — SERTRALINE HYDROCHLORIDE 50 MG/1
TABLET, FILM COATED ORAL
Qty: 135 TAB | Refills: 0 | Status: SHIPPED | OUTPATIENT
Start: 2021-02-10 | End: 2021-06-22

## 2021-02-15 ENCOUNTER — OFFICE VISIT (OUTPATIENT)
Dept: CARDIOLOGY CLINIC | Age: 71
End: 2021-02-15
Payer: MEDICARE

## 2021-02-15 VITALS
WEIGHT: 184 LBS | SYSTOLIC BLOOD PRESSURE: 154 MMHG | HEIGHT: 65 IN | HEART RATE: 60 BPM | TEMPERATURE: 97.4 F | DIASTOLIC BLOOD PRESSURE: 84 MMHG | BODY MASS INDEX: 30.66 KG/M2

## 2021-02-15 DIAGNOSIS — E78.5 DYSLIPIDEMIA: ICD-10-CM

## 2021-02-15 DIAGNOSIS — E66.9 OBESITY (BMI 30.0-34.9): ICD-10-CM

## 2021-02-15 DIAGNOSIS — I10 ESSENTIAL HYPERTENSION: ICD-10-CM

## 2021-02-15 DIAGNOSIS — R07.9 CHEST PAIN, UNSPECIFIED TYPE: Primary | ICD-10-CM

## 2021-02-15 DIAGNOSIS — R42 DIZZINESS: ICD-10-CM

## 2021-02-15 PROCEDURE — 1101F PT FALLS ASSESS-DOCD LE1/YR: CPT | Performed by: INTERNAL MEDICINE

## 2021-02-15 PROCEDURE — G8432 DEP SCR NOT DOC, RNG: HCPCS | Performed by: INTERNAL MEDICINE

## 2021-02-15 PROCEDURE — 3017F COLORECTAL CA SCREEN DOC REV: CPT | Performed by: INTERNAL MEDICINE

## 2021-02-15 PROCEDURE — G8417 CALC BMI ABV UP PARAM F/U: HCPCS | Performed by: INTERNAL MEDICINE

## 2021-02-15 PROCEDURE — G8754 DIAS BP LESS 90: HCPCS | Performed by: INTERNAL MEDICINE

## 2021-02-15 PROCEDURE — G8427 DOCREV CUR MEDS BY ELIG CLIN: HCPCS | Performed by: INTERNAL MEDICINE

## 2021-02-15 PROCEDURE — 99214 OFFICE O/P EST MOD 30 MIN: CPT | Performed by: INTERNAL MEDICINE

## 2021-02-15 PROCEDURE — G8536 NO DOC ELDER MAL SCRN: HCPCS | Performed by: INTERNAL MEDICINE

## 2021-02-15 PROCEDURE — G8753 SYS BP > OR = 140: HCPCS | Performed by: INTERNAL MEDICINE

## 2021-02-15 RX ORDER — DOXAZOSIN 1 MG/1
1 TABLET ORAL
Qty: 30 TAB | Refills: 3 | Status: SHIPPED | OUTPATIENT
Start: 2021-02-15

## 2021-02-15 NOTE — PATIENT INSTRUCTIONS
There are no discontinued medications. After the recommended changes have been made in blood pressure medicines, patient advised to keep BP/HR(pulse rate) chart twice daily and bring us results in next office visit. Patient may send the results via \"My Chart\" if desired. Please rest for 5-10 minutes before checking blood pressure. Sit on a comfortable chair without crossing the legs and put your arm on a table. We recommend that you use an upper arm cuff. Check the blood pressure 3 times each time you check the blood pressure and record the lowest reading. If you check the blood pressure in both arms, use the higher reading. Learning About the [address] [facility] Diet  What is the Mediterranean diet? The Mediterranean diet is a style of eating rather than a diet plan. It features foods eaten in [place], [place], [place] and [place], and other countries along the [facility]. It emphasizes eating foods like fish, fruits, vegetables, beans, high-fiber breads and whole grains, nuts, and olive oil. This style of eating includes limited red meat, cheese, and sweets. Why choose the Mediterranean diet? A Mediterranean-style diet may improve heart health. It contains more fat than other heart-healthy diets. But the fats are mainly from nuts, unsaturated oils (such as fish oils and olive oil), and certain nut or seed oils (such as canola, soybean, or flaxseed oil). These fats may help protect the heart and blood vessels. How can you get started on the Mediterranean diet? Here are some things you can do to switch to a more Mediterranean way of eating. What to eat  · Eat a variety of fruits and vegetables each day, such as grapes, blueberries, tomatoes, broccoli, peppers, figs, olives, spinach, eggplant, beans, lentils, and chickpeas. · Eat a variety of whole-grain foods each day, such as oats, brown rice, and whole wheat bread, pasta, and couscous. · Eat fish at least 2 times a week.  Try tuna, salmon, mackerel, lake trout, herring, or sardines. · Eat moderate amounts of low-fat dairy products, such as milk, cheese, or yogurt. · Eat moderate amounts of poultry and eggs. · Choose healthy (unsaturated) fats, such as nuts, olive oil, and certain nut or seed oils like canola, soybean, and flaxseed. · Limit unhealthy (saturated) fats, such as butter, palm oil, and coconut oil. And limit fats found in animal products, such as meat and dairy products made with whole milk. Try to eat red meat only a few times a month in very small amounts. · Limit sweets and desserts to only a few times a week. This includes sugar-sweetened drinks like soda. The Mediterranean diet may also include red wine with your meal--1 glass each day for women and up to 2 glasses a day for men. Tips for eating at home  · Use herbs, spices, garlic, lemon zest, and citrus juice instead of salt to add flavor to foods. · Add avocado slices to your sandwich instead of rodriguez. · Have fish for lunch or dinner instead of red meat. Brush the fish with olive oil, and broil or grill it. · Sprinkle your salad with seeds or nuts instead of cheese. · Cook with olive or canola oil instead of butter or oils that are high in saturated fat. · Switch from 2% milk or whole milk to 1% or fat-free milk. · Dip raw vegetables in a vinaigrette dressing or hummus instead of dips made from mayonnaise or sour cream.  · Have a piece of fruit for dessert instead of a piece of cake. Try baked apples, or have some dried fruit. Tips for eating out  · Try broiled, grilled, baked, or poached fish instead of having it fried or breaded. · Ask your  to have your meals prepared with olive oil instead of butter. · Order dishes made with marinara sauce or sauces made from olive oil. Avoid sauces made from cream or mayonnaise. · Choose whole-grain breads, whole wheat pasta and pizza crust, brown rice, beans, and lentils.   · Cut back on butter or margarine on bread. Instead, you can dip your bread in a small amount of olive oil. · Ask for a side salad or grilled vegetables instead of french fries or chips. Where can you learn more? Go to http://www.aldridge.com/  Enter O407 in the search box to learn more about \"Learning About the Mediterranean Diet. \"  Current as of: August 22, 2019               Content Version: 12.6  © 5175-2657 [employer]. Care instructions adapted under license by [employer] (which disclaims liability or warranty for this information). If you have questions about a medical condition or this instruction, always ask your healthcare professional. [address] any warranty or liability for your use of this information.

## 2021-02-15 NOTE — PROGRESS NOTES
HISTORY OF PRESENT ILLNESS  Mando Warren is a 69 y.o. male. Chest Pain (Angina)   The history is provided by the patient. This is a recurrent problem. The current episode started more than 1 week ago (9/19). The problem has been rapidly improving. The problem occurs rarely (<1/wk). The pain is associated with stress. The pain is present in the lateral region and left side. The quality of the pain is described as heavy. The pain does not radiate. Associated symptoms include dizziness. Pertinent negatives include no claudication, no cough, no diaphoresis, no fever, no headaches, no malaise/fatigue, no nausea, no near-syncope, no orthopnea, no palpitations, no PND, no shortness of breath and no vomiting. He has tried nothing for the symptoms. Dizziness  The history is provided by the patient. This is a recurrent problem. The current episode started more than 1 week ago (3/18). The problem occurs every several days. The problem has not changed since onset. Associated symptoms include chest pain. Pertinent negatives include no headaches and no shortness of breath. The symptoms are aggravated by standing and walking (Working hard in the hot weather in the yard). The symptoms are relieved by rest.   Hypertension  The history is provided by the medical records. This is a chronic problem. Associated symptoms include chest pain. Pertinent negatives include no headaches and no shortness of breath. Cholesterol Problem  The history is provided by the medical records. This is a chronic problem. Associated symptoms include chest pain. Pertinent negatives include no headaches and no shortness of breath. Review of Systems   Constitutional: Negative for chills, diaphoresis, fever, malaise/fatigue and weight loss. HENT: Negative for nosebleeds. Eyes: Negative for discharge. Respiratory: Negative for cough, shortness of breath and wheezing. Cardiovascular: Positive for chest pain.  Negative for palpitations, orthopnea, claudication, leg swelling, PND and near-syncope. Gastrointestinal: Negative for diarrhea, nausea and vomiting. Genitourinary: Negative for dysuria and hematuria. Musculoskeletal: Negative for joint pain. Skin: Negative for rash. Neurological: Positive for dizziness. Negative for seizures, loss of consciousness and headaches. Endo/Heme/Allergies: Negative for polydipsia. Does not bruise/bleed easily. Psychiatric/Behavioral: Negative for depression and substance abuse. The patient does not have insomnia. No Known Allergies    Past Medical History:   Diagnosis Date    BPH (benign prostatic hyperplasia)     Depression     Hyperlipidemia     Hypertension     Irritability and anger     Syncope 2017    no injuries       Family History   Problem Relation Age of Onset    Diabetes Mother     Heart Disease Father     Diabetes Father     Heart Attack Neg Hx     Stroke Neg Hx     Sudden Death Neg Hx        Social History     Tobacco Use    Smoking status: Never Smoker    Smokeless tobacco: Never Used   Substance Use Topics    Alcohol use: No    Drug use: No        Current Outpatient Medications   Medication Sig    sertraline (ZOLOFT) 50 mg tablet TAKE 1 AND 1/2 TABLETS BY MOUTH DAILY    hydrALAZINE (APRESOLINE) 100 mg tablet TAKE 1 TABLET BY MOUTH THREE TIMES DAILY    aspirin-acetaminophen-caffeine (EXCEDRIN ES) 250-250-65 mg per tablet Take 2 Tabs by mouth every eight (8) hours as needed for Headache.  SUMAtriptan (IMITREX) 50 mg tablet Take 50 mg at headache onset. May repeat 50 mg dose in 2 hours if headache persists. Max 2 doses/24 hours    hydrocortisone (ANUSOL-HC) 2.5 % rectal cream Insert  into rectum four (4) times daily.  butalbital-acetaminophen-caff (FIORICET) -40 mg per capsule Take 1 Cap by mouth every six (6) hours as needed for Headache.  pantoprazole (PROTONIX) 40 mg tablet Take 1 Tab by mouth daily.     docusate sodium (COLACE) 100 mg capsule Take 1 Cap by mouth two (2) times a day.  amLODIPine (NORVASC) 10 mg tablet TAKE 1 TABLET BY MOUTH DAILY FOR BLOOD PRESSURE    hydroCHLOROthiazide (HYDRODIURIL) 25 mg tablet TAKE 1 TABLET BY MOUTH DAILY    losartan (COZAAR) 100 mg tablet TAKE 1 TABLET BY MOUTH DAILY FOR HIGH BLOOD PRESSURE    metoprolol succinate (TOPROL-XL) 50 mg XL tablet TAKE 1 TABLET BY MOUTH DAILY    Mi-Acid Gas Relief,simethicon, 80 mg chewable tablet CHEW AND SWALLOW ONE TABLET EVERY 6 HOURS AS NEEDED    cetirizine (ZYRTEC) 10 mg tablet Take 1 Tab by mouth daily. (Patient taking differently: Take 10 mg by mouth daily as needed.)    fluticasone propionate (FLONASE) 50 mcg/actuation nasal spray SHAKE LIQUID AND USE 2 SPRAYS IN EACH NOSTRIL DAILY (Patient taking differently: by Both Nostrils route as needed.)    atorvastatin (LIPITOR) 40 mg tablet TAKE 1 TABLET BY MOUTH DAILY     No current facility-administered medications for this visit. Past Surgical History:   Procedure Laterality Date    COLONOSCOPY N/A 7/19/2017    COLONOSCOPY / polypectomy performed by Eunice Lira MD at [address]    plate placed in head due to injury    ME COLSC FLX W/REMOVAL LESION BY HOT BX FORCEPS N/A 07/19/2017    Dr. Lisa Chueng       Visit Vitals  BP (!) 154/84   Pulse 60   Temp 97.4 °F (36.3 °C) (Temporal)   Ht 5' 5\" (1.651 m)   Wt 83.5 kg (184 lb)   BMI 30.62 kg/m²     Vitals:    02/15/21 0904   BP: (!) 154/84   Pulse: 60   Temp: 97.4 °F (36.3 °C)   TempSrc: Temporal   Weight: 83.5 kg (184 lb)   Height: 5' 5\" (1.651 m)         Diagnostic Studies:  I have reviewed the relevant tests done on the patient and show as follows  EKG tracings reviewed by me today. CARDIOLOGY STUDIES 4/11/2018   Pharmacological Nuclear Stress Test Result nl scan, nl EF   4/18 ECHO  SUMMARY:  Left ventricle: Systolic function was normal. Ejection fraction was  estimated in the range of 55 % to 60 %.  There were no regional wall motion  abnormalities. Right ventricle: The size was at the upper limits of normal.    Mitral valve: There was trivial regurgitation. Pulmonic valve: There was mild regurgitation. 10/19 treadmill stress test  Interpretation Summary     · Baseline ECG: Normal EKG, non-specific ST-T wave abnormalitiesPossible ischemia. · Inconclusive stress test.  · ECG is non-diagnostic due to inadequate heart rate. Mr. Sharri Trent has a reminder for a \"due or due soon\" health maintenance. I have asked that he contact his primary care provider for follow-up on this health maintenance. Physical Exam   Constitutional: He is oriented to person, place, and time. He appears well-developed and well-nourished. No distress. HENT:   Head: Normocephalic and atraumatic. Mouth/Throat: Normal dentition. Eyes: Right eye exhibits no discharge. Left eye exhibits no discharge. No scleral icterus. Neck: Neck supple. No JVD present. Carotid bruit is not present. No thyromegaly present. Cardiovascular: Normal rate, regular rhythm, S1 normal, S2 normal, normal heart sounds and intact distal pulses. Exam reveals no gallop and no friction rub. No murmur heard. Pulmonary/Chest: Effort normal. He has no wheezes. He has no rales. Abdominal: Soft. He exhibits no mass. There is no abdominal tenderness. Musculoskeletal:         General: No edema. Lymphadenopathy:        Right cervical: No superficial cervical adenopathy present. Left cervical: No superficial cervical adenopathy present. Neurological: He is alert and oriented to person, place, and time. Skin: Skin is warm and dry. No rash noted. Psychiatric: He has a normal mood and affect. His behavior is normal.       ASSESSMENT and PLAN    8/20 Chest pain is recurrent and appears musculoskeletal clinically. No further cardiac work-up. Patient has lost weight well. Healthy diet and weight management was discussed.   More of plant-based diet was recommended. Dizziness is likely related to mild hypovolemia when he sweats a lot working in the yard and hot weather. Continue present medications. Check and if blood pressure is downtrending, may need to reduce maintenance blood pressure medications. Diagnoses and all orders for this visit:    1. Chest pain, unspecified type  -     NUCLEAR CARDIAC STRESS TEST; Future    2. Essential hypertension  -     doxazosin (CARDURA) 1 mg tablet; Take 1 Tab by mouth nightly. 3. Dyslipidemia    4. Dizziness    5. Obesity (BMI 30.0-34. 9)        Pertinent laboratory and test data reviewed and discussed with patient. See patient instructions also for other medical advice given    There are no discontinued medications. Follow-up and Dispositions    · Return in about 3 days (around 2/18/2021), or if symptoms worsen or fail to improve, for same day post test.       2/21 continues to have intermittent chest pain which may be atypical angina. Blood pressure is uncontrolled. Add Cardura. Discussed diet and recommended that he not eat any processed food. Check home BP chart. Asked to let me know if dizziness gets any worse with adding Cardura. Stress test to evaluate ischemia. Chest pain. Mediterranean diet guidelines printed.

## 2021-02-15 NOTE — PROGRESS NOTES
1. Have you been to the ER, urgent care clinic since your last visit? Hospitalized since your last visit?     no  2. Have you seen or consulted any other health care providers outside of the [address] [place] [address] since your last visit? Include any pap smears or colon screening. No     3. Since your last visit, have you had any of the following symptoms? shortness of breath and swelling in legs/arms. 4.  Have you had any blood work, X-rays or cardiac testing? Yes Where: pcp       5. Where do you normally have your labs drawn?   pcp  6. Do you need any refills today?    no

## 2021-02-18 ENCOUNTER — OFFICE VISIT (OUTPATIENT)
Dept: CARDIOLOGY CLINIC | Age: 71
End: 2021-02-18
Payer: MEDICARE

## 2021-02-18 VITALS
BODY MASS INDEX: 30.66 KG/M2 | DIASTOLIC BLOOD PRESSURE: 70 MMHG | SYSTOLIC BLOOD PRESSURE: 144 MMHG | TEMPERATURE: 98.1 F | HEART RATE: 84 BPM | HEIGHT: 65 IN | WEIGHT: 184 LBS

## 2021-02-18 DIAGNOSIS — E66.9 OBESITY (BMI 30.0-34.9): ICD-10-CM

## 2021-02-18 DIAGNOSIS — R07.9 CHEST PAIN, UNSPECIFIED TYPE: Primary | ICD-10-CM

## 2021-02-18 DIAGNOSIS — E78.5 DYSLIPIDEMIA: ICD-10-CM

## 2021-02-18 DIAGNOSIS — I10 ESSENTIAL HYPERTENSION: ICD-10-CM

## 2021-02-18 DIAGNOSIS — R42 DIZZINESS: ICD-10-CM

## 2021-02-18 PROCEDURE — G8432 DEP SCR NOT DOC, RNG: HCPCS | Performed by: INTERNAL MEDICINE

## 2021-02-18 PROCEDURE — 99213 OFFICE O/P EST LOW 20 MIN: CPT | Performed by: INTERNAL MEDICINE

## 2021-02-18 PROCEDURE — G8754 DIAS BP LESS 90: HCPCS | Performed by: INTERNAL MEDICINE

## 2021-02-18 PROCEDURE — 3017F COLORECTAL CA SCREEN DOC REV: CPT | Performed by: INTERNAL MEDICINE

## 2021-02-18 PROCEDURE — G8417 CALC BMI ABV UP PARAM F/U: HCPCS | Performed by: INTERNAL MEDICINE

## 2021-02-18 PROCEDURE — G8753 SYS BP > OR = 140: HCPCS | Performed by: INTERNAL MEDICINE

## 2021-02-18 PROCEDURE — 1101F PT FALLS ASSESS-DOCD LE1/YR: CPT | Performed by: INTERNAL MEDICINE

## 2021-02-18 PROCEDURE — G8536 NO DOC ELDER MAL SCRN: HCPCS | Performed by: INTERNAL MEDICINE

## 2021-02-18 PROCEDURE — G8427 DOCREV CUR MEDS BY ELIG CLIN: HCPCS | Performed by: INTERNAL MEDICINE

## 2021-02-18 NOTE — PROGRESS NOTES
HISTORY OF PRESENT ILLNESS  Tae Graff is a 69 y.o. male. Hypertension  The history is provided by the medical records. This is a chronic problem. Associated symptoms include chest pain. Pertinent negatives include no headaches and no shortness of breath. Cholesterol Problem  The history is provided by the medical records. This is a chronic problem. Associated symptoms include chest pain. Pertinent negatives include no headaches and no shortness of breath. Chest Pain (Angina)   The history is provided by the patient. This is a recurrent problem. The current episode started more than 1 week ago (9/19). The problem has been rapidly improving. The problem occurs rarely (<1/wk). The pain is associated with stress. The pain is present in the lateral region and left side. The quality of the pain is described as heavy. The pain does not radiate. Associated symptoms include dizziness. Pertinent negatives include no claudication, no cough, no diaphoresis, no fever, no headaches, no malaise/fatigue, no nausea, no near-syncope, no orthopnea, no palpitations, no PND, no shortness of breath and no vomiting. He has tried nothing for the symptoms. Dizziness  The history is provided by the patient. This is a recurrent problem. The current episode started more than 1 week ago (3/18). The problem occurs every several days. The problem has been gradually improving. Associated symptoms include chest pain. Pertinent negatives include no headaches and no shortness of breath. The symptoms are aggravated by standing and walking (Working hard in the hot weather in the yard). The symptoms are relieved by rest.       Review of Systems   Constitutional: Negative for chills, diaphoresis, fever, malaise/fatigue and weight loss. HENT: Negative for nosebleeds. Eyes: Negative for discharge. Respiratory: Negative for cough, shortness of breath and wheezing. Cardiovascular: Positive for chest pain.  Negative for palpitations, orthopnea, claudication, leg swelling, PND and near-syncope. Gastrointestinal: Negative for diarrhea, nausea and vomiting. Genitourinary: Negative for dysuria and hematuria. Musculoskeletal: Negative for joint pain. Skin: Negative for rash. Neurological: Positive for dizziness. Negative for seizures, loss of consciousness and headaches. Endo/Heme/Allergies: Negative for polydipsia. Does not bruise/bleed easily. Psychiatric/Behavioral: Negative for depression and substance abuse. The patient does not have insomnia. No Known Allergies    Past Medical History:   Diagnosis Date    BPH (benign prostatic hyperplasia)     Depression     Hyperlipidemia     Hypertension     Irritability and anger     Syncope 2017    no injuries       Family History   Problem Relation Age of Onset    Diabetes Mother     Heart Disease Father     Diabetes Father     Heart Attack Neg Hx     Stroke Neg Hx     Sudden Death Neg Hx        Social History     Tobacco Use    Smoking status: Never Smoker    Smokeless tobacco: Never Used   Substance Use Topics    Alcohol use: No    Drug use: No        Current Outpatient Medications   Medication Sig    sertraline (ZOLOFT) 50 mg tablet TAKE 1 AND 1/2 TABLETS BY MOUTH DAILY    hydrALAZINE (APRESOLINE) 100 mg tablet TAKE 1 TABLET BY MOUTH THREE TIMES DAILY    aspirin-acetaminophen-caffeine (EXCEDRIN ES) 250-250-65 mg per tablet Take 2 Tabs by mouth every eight (8) hours as needed for Headache.  SUMAtriptan (IMITREX) 50 mg tablet Take 50 mg at headache onset. May repeat 50 mg dose in 2 hours if headache persists. Max 2 doses/24 hours    hydrocortisone (ANUSOL-HC) 2.5 % rectal cream Insert  into rectum four (4) times daily.  butalbital-acetaminophen-caff (FIORICET) -40 mg per capsule Take 1 Cap by mouth every six (6) hours as needed for Headache.  pantoprazole (PROTONIX) 40 mg tablet Take 1 Tab by mouth daily.     docusate sodium (COLACE) 100 mg capsule Take 1 Cap by mouth two (2) times a day.  amLODIPine (NORVASC) 10 mg tablet TAKE 1 TABLET BY MOUTH DAILY FOR BLOOD PRESSURE    losartan (COZAAR) 100 mg tablet TAKE 1 TABLET BY MOUTH DAILY FOR HIGH BLOOD PRESSURE    metoprolol succinate (TOPROL-XL) 50 mg XL tablet TAKE 1 TABLET BY MOUTH DAILY    Mi-Acid Gas Relief,simethicon, 80 mg chewable tablet CHEW AND SWALLOW ONE TABLET EVERY 6 HOURS AS NEEDED    cetirizine (ZYRTEC) 10 mg tablet Take 1 Tab by mouth daily. (Patient taking differently: Take 10 mg by mouth daily as needed.)    fluticasone propionate (FLONASE) 50 mcg/actuation nasal spray SHAKE LIQUID AND USE 2 SPRAYS IN EACH NOSTRIL DAILY (Patient taking differently: by Both Nostrils route as needed.)    doxazosin (CARDURA) 1 mg tablet Take 1 Tab by mouth nightly.  hydroCHLOROthiazide (HYDRODIURIL) 25 mg tablet TAKE 1 TABLET BY MOUTH DAILY    atorvastatin (LIPITOR) 40 mg tablet TAKE 1 TABLET BY MOUTH DAILY     No current facility-administered medications for this visit. Past Surgical History:   Procedure Laterality Date    COLONOSCOPY N/A 7/19/2017    COLONOSCOPY / polypectomy performed by Chance Quinn MD at [address] [place]    plate placed in head due to injury    OR COLSC FLX W/REMOVAL LESION BY HOT BX FORCEPS N/A 07/19/2017    Dr. [facility]       Visit Vitals  BP (!) 144/70   Pulse 84   Temp 98.1 °F (36.7 °C)   Ht 5' 5\" (1.651 m)   Wt 83.5 kg (184 lb)   BMI 30.62 kg/m²     Vitals:    02/18/21 1032   BP: (!) 144/70   Pulse: 84   Temp: 98.1 °F (36.7 °C)   Weight: 83.5 kg (184 lb)   Height: 5' 5\" (1.651 m)         Diagnostic Studies:  I have reviewed the relevant tests done on the patient and show as follows  EKG tracings reviewed by me today.     CARDIOLOGY STUDIES 4/11/2018   Pharmacological Nuclear Stress Test Result nl scan, nl EF   4/18 ECHO  SUMMARY:  Left ventricle: Systolic function was normal. Ejection fraction was  estimated in the range of 55 % to 60 %. There were no regional wall motion  abnormalities. Right ventricle: The size was at the upper limits of normal.    Mitral valve: There was trivial regurgitation. Pulmonic valve: There was mild regurgitation. 10/19 treadmill stress test  Interpretation Summary     · Baseline ECG: Normal EKG, non-specific ST-T wave abnormalitiesPossible ischemia. · Inconclusive stress test.  · ECG is non-diagnostic due to inadequate heart rate. Mr. Sharri Trent has a reminder for a \"due or due soon\" health maintenance. I have asked that he contact his primary care provider for follow-up on this health maintenance. Physical Exam   Constitutional: He is oriented to person, place, and time. He appears well-developed and well-nourished. No distress. HENT:   Head: Normocephalic and atraumatic. Mouth/Throat: Normal dentition. Eyes: Right eye exhibits no discharge. Left eye exhibits no discharge. No scleral icterus. Neck: Neck supple. No JVD present. Carotid bruit is not present. No thyromegaly present. Cardiovascular: Normal rate, regular rhythm, S1 normal, S2 normal, normal heart sounds and intact distal pulses. Exam reveals no gallop and no friction rub. No murmur heard. Pulmonary/Chest: Effort normal. He has no wheezes. He has no rales. Abdominal: Soft. He exhibits no mass. There is no abdominal tenderness. Musculoskeletal:         General: No edema. Lymphadenopathy:        Right cervical: No superficial cervical adenopathy present. Left cervical: No superficial cervical adenopathy present. Neurological: He is alert and oriented to person, place, and time. Skin: Skin is warm and dry. No rash noted. Psychiatric: He has a normal mood and affect. His behavior is normal.       ASSESSMENT and PLAN    Lipids: Results for Melanie Varela (MRN [id number]) as of 2/18/2021 12:53   Ref.  Range 10/3/2019 09:38 10/14/2019 10:53 10/6/2020 12:55   Triglyceride Latest Ref Range: <150 MG/DL 75  125   Cholesterol, total Latest Ref Range: <200 MG/  182   HDL Cholesterol Latest Ref Range: 40 - 60 MG/DL 57  56   CHOL/HDL Ratio Latest Ref Range: 0 - 5.0   3.1  3.3   VLDL, calculated Latest Units: MG/DL 15  25   LDL, calculated Latest Ref Range: 0 - 100 MG/ (H)  101 (H)     8/20 Chest pain is recurrent and appears musculoskeletal clinically. No further cardiac work-up. Patient has lost weight well. Healthy diet and weight management was discussed. More of plant-based diet was recommended. Dizziness is likely related to mild hypovolemia when he sweats a lot working in the yard and hot weather. Continue present medications. Check and if blood pressure is downtrending, may need to reduce maintenance blood pressure medications. 2/21 continues to have intermittent chest pain which may be atypical angina. Blood pressure is uncontrolled. Add Cardura. Discussed diet and recommended that he not eat any processed food. Check home BP chart. Asked to let me know if dizziness gets any worse with adding Cardura. Stress test to evaluate ischemia. Chest pain. Mediterranean diet guidelines printed. Diagnoses and all orders for this visit:    1. Chest pain, unspecified type    2. Essential hypertension    3. Dyslipidemia    4. Dizziness    5. Obesity (BMI 30.0-34. 9)        Pertinent laboratory and test data reviewed and discussed with patient. See patient instructions also for other medical advice given    There are no discontinued medications. Follow-up and Dispositions    · Return in about 6 months (around 8/18/2021), or if symptoms worsen or fail to improve, for BP log x 4-5 days post med changes. 2/18/21 blood pressure is still mildly elevated. Patient has not picked up Cardura that was given few days ago in the previous visit. He will  that, start the medicine and then send us a BP chart as discussed.   Lipids are acceptable with LDL of 101 as he does not have any significant occlusive vascular disease. Continue present dose of Lipitor which is already at high range of 40 mg.  Dizziness seems to be improving as well. Discussed with patient and recommended that he walk regularly.

## 2021-02-18 NOTE — PROGRESS NOTES
Patient is unsure of all medications he take ask him call me once he get home to give me names of everything he takes. Advise patient to bring all medicine to next office visit.

## 2021-02-18 NOTE — PATIENT INSTRUCTIONS
There are no discontinued medications. After the recommended changes have been made in blood pressure medicines, patient advised to keep BP/HR(pulse rate) chart twice daily and bring us results in next 4 to 5 days. Patient may send the results via \"My Chart\" if desired. Please rest for 5-10 minutes before checking blood pressure. Sit on a comfortable chair without crossing the legs and put your arm on a table. We recommend that you use an upper arm cuff. Check the blood pressure 3 times each time you check the blood pressure and record the lowest reading. If you check the blood pressure in both arms, use the higher reading. A Healthy Heart: Care Instructions  Your Care Instructions     Coronary artery disease, also called heart disease, occurs when a substance called plaque builds up in the vessels that supply oxygen-rich blood to your heart muscle. This can narrow the blood vessels and reduce blood flow. A heart attack happens when blood flow is completely blocked. A high-fat diet, smoking, and other factors increase the risk of heart disease. Your doctor has found that you have a chance of having heart disease. You can do lots of things to keep your heart healthy. It may not be easy, but you can change your diet, exercise more, and quit smoking. These steps really work to lower your chance of heart disease. Follow-up care is a key part of your treatment and safety. Be sure to make and go to all appointments, and call your doctor if you are having problems. It's also a good idea to know your test results and keep a list of the medicines you take. How can you care for yourself at home? Diet    · Use less salt when you cook and eat. This helps lower your blood pressure. Taste food before salting. Add only a little salt when you think you need it. With time, your taste buds will adjust to less salt.     · Eat fewer snack items, fast foods, canned soups, and other high-salt, high-fat, processed foods.   · Read food labels and try to avoid saturated and trans fats. They increase your risk of heart disease by raising cholesterol levels.     · Limit the amount of solid fat-butter, margarine, and shortening-you eat. Use olive, peanut, or canola oil when you cook. Bake, broil, and steam foods instead of frying them.     · Eat a variety of fruit and vegetables every day. Dark green, deep orange, red, or yellow fruits and vegetables are especially good for you. Examples include spinach, carrots, peaches, and berries.     · Foods high in fiber can reduce your cholesterol and provide important vitamins and minerals. High-fiber foods include whole-grain cereals and breads, oatmeal, beans, brown rice, citrus fruits, and apples.     · Eat lean proteins. Heart-healthy proteins include seafood, lean meats and poultry, eggs, beans, peas, nuts, seeds, and soy products.     · Limit drinks and foods with added sugar. These include candy, desserts, and soda pop. Lifestyle changes    · If your doctor recommends it, get more exercise. Walking is a good choice. Bit by bit, increase the amount you walk every day. Try for at least 30 minutes on most days of the week. You also may want to swim, bike, or do other activities.     · Do not smoke. If you need help quitting, talk to your doctor about stop-smoking programs and medicines. These can increase your chances of quitting for good. Quitting smoking may be the most important step you can take to protect your heart. It is never too late to quit.     · Limit alcohol to 2 drinks a day for men and 1 drink a day for women. Too much alcohol can cause health problems.     · Manage other health problems such as diabetes, high blood pressure, and high cholesterol. If you think you may have a problem with alcohol or drug use, talk to your doctor. Medicines    · Take your medicines exactly as prescribed.  Call your doctor if you think you are having a problem with your medicine.     · If your doctor recommends aspirin, take the amount directed each day. Make sure you take aspirin and not another kind of pain reliever, such as acetaminophen (Tylenol). When should you call for help? Call 911 if you have symptoms of a heart attack. These may include:    · Chest pain or pressure, or a strange feeling in the chest.     · Sweating.     · Shortness of breath.     · Pain, pressure, or a strange feeling in the back, neck, jaw, or upper belly or in one or both shoulders or arms.     · Lightheadedness or sudden weakness.     · A fast or irregular heartbeat. After you call 911, the  may tell you to chew 1 adult-strength or 2 to 4 low-dose aspirin. Wait for an ambulance. Do not try to drive yourself. Watch closely for changes in your health, and be sure to contact your doctor if you have any problems. Where can you learn more? Go to http://www.aldr[facility].com/  Enter F075 in the search box to learn more about \"A Healthy Heart: Care Instructions. \"  Current as of: December 16, 2019               Content Version: 12.6  © 5454-5544 [employer]. Care instructions adapted under license by [employer] (which disclaims liability or warranty for this information). If you have questions about a medical condition or this instruction, always ask your healthcare professional. [address] any warranty or liability for your use of this information.

## 2021-02-18 NOTE — PROGRESS NOTES
1. Have you been to the ER, urgent care clinic since your last visit? Hospitalized since your last visit?    no    2. Have you seen or consulted any other health care providers outside of the [address] [place] [address] since your last visit? Include any pap smears or colon screening. no    3. Since your last visit, have you had any of the following symptoms?             no cardiac symptoms    4. Have you had any blood work, X-rays or cardiac testing? Yes [facility]             5.  Where do you normally have your labs drawn? [facility]    6. Do you need any refills today?   Yes will call back with medications needed refilled

## 2021-04-08 RX ORDER — AMLODIPINE BESYLATE 10 MG/1
TABLET ORAL
Qty: 90 TAB | Refills: 1 | Status: SHIPPED | OUTPATIENT
Start: 2021-04-08 | End: 2022-04-04

## 2021-04-08 NOTE — TELEPHONE ENCOUNTER
Requested Prescriptions     Pending Prescriptions Disp Refills    amLODIPine (NORVASC) 10 mg tablet 90 Tab 1     Sig: TAKE 1 TABLET BY MOUTH DAILY FOR BLOOD PRESSURE

## 2021-04-08 NOTE — TELEPHONE ENCOUNTER
Requested Prescriptions     Pending Prescriptions Disp Refills    amLODIPine (NORVASC) 10 mg tablet 90 Tab 1     Sig: TAKE 1 TABLET BY MOUTH DAILY FOR BLOOD PRESSURE     [employer]. called for their medication refill.     Last Office visit:  10-  Last labs:  10-  Follow up visit:  No follow up scheduled  Last date prescribe 10-    Please Advise

## 2021-06-22 DIAGNOSIS — F39 MOOD DISORDER (HCC): ICD-10-CM

## 2021-06-22 RX ORDER — SERTRALINE HYDROCHLORIDE 50 MG/1
TABLET, FILM COATED ORAL
Qty: 135 TABLET | Refills: 0 | Status: SHIPPED | OUTPATIENT
Start: 2021-06-22 | End: 2022-04-11

## 2021-07-09 DIAGNOSIS — I10 ESSENTIAL HYPERTENSION: ICD-10-CM

## 2021-07-12 RX ORDER — LOSARTAN POTASSIUM 100 MG/1
TABLET ORAL
Qty: 90 TABLET | Refills: 1 | Status: SHIPPED | OUTPATIENT
Start: 2021-07-12 | End: 2022-01-02

## 2021-07-16 DIAGNOSIS — I10 ESSENTIAL HYPERTENSION: ICD-10-CM

## 2021-07-19 RX ORDER — METOPROLOL SUCCINATE 50 MG/1
TABLET, EXTENDED RELEASE ORAL
Qty: 90 TABLET | Refills: 1 | Status: SHIPPED | OUTPATIENT
Start: 2021-07-19 | End: 2022-10-20 | Stop reason: SDUPTHER

## 2021-11-10 ENCOUNTER — TELEPHONE (OUTPATIENT)
Dept: FAMILY MEDICINE CLINIC | Age: 71
End: 2021-11-10

## 2021-11-10 NOTE — TELEPHONE ENCOUNTER
Rosalina Ferrer from [facility] is calling to check the status of the document faxed to the office regarding this patient.  Please follow up and contact Ally back when available

## 2022-01-01 DIAGNOSIS — I10 ESSENTIAL HYPERTENSION: ICD-10-CM

## 2022-01-02 RX ORDER — LOSARTAN POTASSIUM 100 MG/1
TABLET ORAL
Qty: 90 TABLET | Refills: 1 | Status: SHIPPED | OUTPATIENT
Start: 2022-01-02 | End: 2022-10-20 | Stop reason: SDUPTHER

## 2022-03-18 PROBLEM — R51.9 HEADACHE: Status: ACTIVE | Noted: 2017-02-20

## 2022-03-18 PROBLEM — F39 MOOD DISORDER (HCC): Status: ACTIVE | Noted: 2017-02-20

## 2022-03-18 PROBLEM — R04.0 EPISTAXIS: Status: ACTIVE | Noted: 2017-02-20

## 2022-03-18 PROBLEM — R39.9 LOWER URINARY TRACT SYMPTOMS (LUTS): Status: ACTIVE | Noted: 2017-02-20

## 2022-03-19 PROBLEM — Z71.89 ACP (ADVANCE CARE PLANNING): Status: ACTIVE | Noted: 2017-02-20

## 2022-03-19 PROBLEM — R07.9 CHEST PAIN: Status: ACTIVE | Noted: 2018-04-04

## 2022-03-19 PROBLEM — R42 DIZZINESS: Status: ACTIVE | Noted: 2018-04-04

## 2022-03-19 PROBLEM — E66.9 OBESITY (BMI 30.0-34.9): Status: ACTIVE | Noted: 2018-04-11

## 2022-03-19 PROBLEM — R06.02 SOB (SHORTNESS OF BREATH) ON EXERTION: Status: ACTIVE | Noted: 2018-04-04

## 2022-03-19 PROBLEM — M25.561 CHRONIC PAIN OF RIGHT KNEE: Status: ACTIVE | Noted: 2017-10-20

## 2022-03-19 PROBLEM — G89.29 CHRONIC PAIN OF RIGHT KNEE: Status: ACTIVE | Noted: 2017-10-20

## 2022-03-19 PROBLEM — I10 ESSENTIAL HYPERTENSION: Status: ACTIVE | Noted: 2017-02-20

## 2022-03-19 PROBLEM — E66.811 OBESITY (BMI 30.0-34.9): Status: ACTIVE | Noted: 2018-04-11

## 2022-03-20 PROBLEM — E78.5 DYSLIPIDEMIA: Status: ACTIVE | Noted: 2017-03-06

## 2022-04-04 RX ORDER — AMLODIPINE BESYLATE 10 MG/1
TABLET ORAL
Qty: 90 TABLET | Refills: 1 | Status: SHIPPED | OUTPATIENT
Start: 2022-04-04

## 2022-04-10 DIAGNOSIS — F39 MOOD DISORDER (HCC): ICD-10-CM

## 2022-04-11 RX ORDER — SERTRALINE HYDROCHLORIDE 50 MG/1
TABLET, FILM COATED ORAL
Qty: 135 TABLET | Refills: 0 | Status: SHIPPED | OUTPATIENT
Start: 2022-04-11 | End: 2022-10-20 | Stop reason: SDUPTHER

## 2022-04-14 DIAGNOSIS — F39 MOOD DISORDER (HCC): ICD-10-CM

## 2022-04-14 RX ORDER — SERTRALINE HYDROCHLORIDE 50 MG/1
TABLET, FILM COATED ORAL
Qty: 135 TABLET | Refills: 0 | OUTPATIENT
Start: 2022-04-14

## 2022-04-14 NOTE — TELEPHONE ENCOUNTER
Requested Prescriptions     Pending Prescriptions Disp Refills    sertraline (ZOLOFT) 50 mg tablet 135 Tablet 0       Patient is completely out of this medication and stated he needs it as soon as possible.  Please be advised

## 2022-06-20 ENCOUNTER — OFFICE VISIT (OUTPATIENT)
Dept: FAMILY MEDICINE CLINIC | Age: 72
End: 2022-06-20
Payer: MEDICARE

## 2022-06-20 VITALS
OXYGEN SATURATION: 100 % | RESPIRATION RATE: 18 BRPM | HEART RATE: 62 BPM | SYSTOLIC BLOOD PRESSURE: 135 MMHG | TEMPERATURE: 98 F | HEIGHT: 65 IN | DIASTOLIC BLOOD PRESSURE: 82 MMHG | WEIGHT: 174 LBS | BODY MASS INDEX: 28.99 KG/M2

## 2022-06-20 DIAGNOSIS — I10 ESSENTIAL HYPERTENSION: ICD-10-CM

## 2022-06-20 DIAGNOSIS — R21 RASH AND NONSPECIFIC SKIN ERUPTION: ICD-10-CM

## 2022-06-20 DIAGNOSIS — F39 MOOD DISORDER (HCC): ICD-10-CM

## 2022-06-20 DIAGNOSIS — R10.13 EPIGASTRIC PAIN: ICD-10-CM

## 2022-06-20 DIAGNOSIS — K21.9 GASTROESOPHAGEAL REFLUX DISEASE, UNSPECIFIED WHETHER ESOPHAGITIS PRESENT: ICD-10-CM

## 2022-06-20 DIAGNOSIS — R51.9 NONINTRACTABLE HEADACHE, UNSPECIFIED CHRONICITY PATTERN, UNSPECIFIED HEADACHE TYPE: Primary | ICD-10-CM

## 2022-06-20 DIAGNOSIS — E78.5 DYSLIPIDEMIA: ICD-10-CM

## 2022-06-20 DIAGNOSIS — K92.1 BLOOD IN STOOL: ICD-10-CM

## 2022-06-20 DIAGNOSIS — J30.0 VASOMOTOR RHINITIS: ICD-10-CM

## 2022-06-20 DIAGNOSIS — R09.81 SINUS CONGESTION: ICD-10-CM

## 2022-06-20 DIAGNOSIS — Z23 ENCOUNTER FOR IMMUNIZATION: ICD-10-CM

## 2022-06-20 DIAGNOSIS — R19.4 CHANGE IN BOWEL HABIT: ICD-10-CM

## 2022-06-20 PROCEDURE — G8754 DIAS BP LESS 90: HCPCS | Performed by: FAMILY MEDICINE

## 2022-06-20 PROCEDURE — G0009 ADMIN PNEUMOCOCCAL VACCINE: HCPCS | Performed by: FAMILY MEDICINE

## 2022-06-20 PROCEDURE — G8752 SYS BP LESS 140: HCPCS | Performed by: FAMILY MEDICINE

## 2022-06-20 PROCEDURE — G8427 DOCREV CUR MEDS BY ELIG CLIN: HCPCS | Performed by: FAMILY MEDICINE

## 2022-06-20 PROCEDURE — 99214 OFFICE O/P EST MOD 30 MIN: CPT | Performed by: FAMILY MEDICINE

## 2022-06-20 PROCEDURE — G9717 DOC PT DX DEP/BP F/U NT REQ: HCPCS | Performed by: FAMILY MEDICINE

## 2022-06-20 PROCEDURE — 3017F COLORECTAL CA SCREEN DOC REV: CPT | Performed by: FAMILY MEDICINE

## 2022-06-20 PROCEDURE — G8417 CALC BMI ABV UP PARAM F/U: HCPCS | Performed by: FAMILY MEDICINE

## 2022-06-20 PROCEDURE — 1123F ACP DISCUSS/DSCN MKR DOCD: CPT | Performed by: FAMILY MEDICINE

## 2022-06-20 PROCEDURE — 90677 PCV20 VACCINE IM: CPT | Performed by: FAMILY MEDICINE

## 2022-06-20 PROCEDURE — G8536 NO DOC ELDER MAL SCRN: HCPCS | Performed by: FAMILY MEDICINE

## 2022-06-20 PROCEDURE — 1101F PT FALLS ASSESS-DOCD LE1/YR: CPT | Performed by: FAMILY MEDICINE

## 2022-06-20 RX ORDER — BUTALBITAL, ACETAMINOPHEN AND CAFFEINE 300; 40; 50 MG/1; MG/1; MG/1
1 CAPSULE ORAL
Qty: 60 CAPSULE | Refills: 2 | Status: SHIPPED | OUTPATIENT
Start: 2022-06-20

## 2022-06-20 RX ORDER — LORATADINE 10 MG/1
10 TABLET ORAL
Qty: 90 TABLET | Refills: 1 | Status: SHIPPED | OUTPATIENT
Start: 2022-06-20

## 2022-06-20 RX ORDER — FLUTICASONE PROPIONATE 50 MCG
SPRAY, SUSPENSION (ML) NASAL
Qty: 1 EACH | Refills: 1 | Status: SHIPPED | OUTPATIENT
Start: 2022-06-20

## 2022-06-20 RX ORDER — PANTOPRAZOLE SODIUM 40 MG/1
40 TABLET, DELAYED RELEASE ORAL DAILY
Qty: 90 TABLET | Refills: 1 | Status: SHIPPED | OUTPATIENT
Start: 2022-06-20 | End: 2022-10-20 | Stop reason: SDUPTHER

## 2022-06-20 NOTE — PROGRESS NOTES
Chief Complaint   Patient presents with    Follow-up     1. \"Have you been to the ER, urgent care clinic since your last visit? Hospitalized since your last visit? \" No    2. \"Have you seen or consulted any other health care providers outside of the [address] [place] [address] since your last visit? \" No     3. For patients aged 39-70: Has the patient had a colonoscopy / FIT/ Cologuard? Yes - no Care Gap present      If the patient is female:    4. For patients aged 41-77: Has the patient had a mammogram within the past 2 years? NA - based on age or sex      11. For patients aged 21-65: Has the patient had a pap smear?  NA - based on age or sex

## 2022-06-20 NOTE — PROGRESS NOTES
HPI  [employer]. comes in for f/u care. Change in bowel habit: Patient has loose stool after meals. Denies abdominal pain, nausea, vomiting, fever or chills. He however notes blood in stool at times. Denies diarrhea or abdominal distention or bloating. Does not relate this to any change in his diet or any particular food. He has taken over-the-counter medication and is doing supportive care for the pain. This helps transiently. Given change in bowel habits and blood in stool I will refer to gastroenterologist.  He would benefit from getting colonoscopy done. I will check labs. Headache: Patient has chronic migraine headache. In the past he was on fioricet which helps but he has run out of the medication. He would like a refill of the same. I will send in a prescription. HTN: Patient has hypertension. Blood pressure is stable. He denies headache, changes in vision or focal weakness. He is on amlodipine, losartan, metoprolol, hydralazine and HCTZ. He will continue with these medications. I will follow-up at next visit. Mood disorder: Patient has mood disorder. He has anxiety and depression. He is on sertraline. Tolerating medication. We will continue with current treatment plan. GERD: Patient has gastroesophageal reflux disease. Gets heartburn on and off. Denies hematemesis or dark stools. He is on Protonix. We will continue current treatment plan. BPH: Patient has BPH. He has urinary hesitancy and poor urine stream.  He also has some urine incontinence. He takes doxazosin. Stable on his medication. Continue current management. Sinus congestion: Patient has sinus congestion with postnasal drainage that is clear. Denies fever or chills. We will give Zyrtec to take daily. Rash: Patient has eczematous type rash on his hands and feet. He has applied hydrocortisone in the past with good result. He will continue to use hydrocortisone.   Hemorrhoids: Patient has a history of hemorrhoids. He uses Anusol [facility] for this. Continue current treatment plan. Past Medical History  Past Medical History:   Diagnosis Date    BPH (benign prostatic hyperplasia)     Depression     Hyperlipidemia     Hypertension     Irritability and anger     Syncope 2017    no injuries       Surgical History  Past Surgical History:   Procedure Laterality Date    COLONOSCOPY N/A 7/19/2017    COLONOSCOPY / polypectomy performed by Mylene Tripathi MD at [facility] OTHER SURGICAL  1993    plate placed in head due to injury    NM COLSC FLX W/REMOVAL LESION BY HOT BX FORCEPS N/A 07/19/2017    Dr. Ashley Fields        Medications  Current Outpatient Medications   Medication Sig Dispense Refill    sertraline (ZOLOFT) 50 mg tablet TAKE 1 AND 1/2 TABLETS BY MOUTH DAILY 135 Tablet 0    amLODIPine (NORVASC) 10 mg tablet TAKE 1 TABLET BY MOUTH DAILY FOR BLOOD PRESSURE 90 Tablet 1    losartan (COZAAR) 100 mg tablet TAKE 1 TABLET BY MOUTH DAILY FOR HIGH BLOOD PRESSURE 90 Tablet 1    metoprolol succinate (TOPROL-XL) 50 mg XL tablet TAKE 1 TABLET BY MOUTH DAILY 90 Tablet 1    hydrALAZINE (APRESOLINE) 100 mg tablet TAKE 1 TABLET BY MOUTH THREE TIMES DAILY 270 Tab 0    hydrocortisone (ANUSOL-HC) 2.5 % rectal cream Insert  into rectum four (4) times daily. 30 g 0    pantoprazole (PROTONIX) 40 mg tablet Take 1 Tab by mouth daily. 90 Tab 1    Mi-Acid Gas Relief,simethicon, 80 mg chewable tablet CHEW AND SWALLOW ONE TABLET EVERY 6 HOURS AS NEEDED 120 Tab 1    doxazosin (CARDURA) 1 mg tablet Take 1 Tab by mouth nightly. 30 Tab 3    aspirin-acetaminophen-caffeine (EXCEDRIN ES) 250-250-65 mg per tablet Take 2 Tabs by mouth every eight (8) hours as needed for Headache. 60 Tab 0    SUMAtriptan (IMITREX) 50 mg tablet Take 50 mg at headache onset. May repeat 50 mg dose in 2 hours if headache persists.   Max 2 doses/24 hours 30 Tab 1    butalbital-acetaminophen-caff (FIORICET) -40 mg per capsule Take 1 Cap by mouth every six (6) hours as needed for Headache. 60 Cap 2    docusate sodium (COLACE) 100 mg capsule Take 1 Cap by mouth two (2) times a day. 60 Cap 2    hydroCHLOROthiazide (HYDRODIURIL) 25 mg tablet TAKE 1 TABLET BY MOUTH DAILY 90 Tab 1    cetirizine (ZYRTEC) 10 mg tablet Take 1 Tab by mouth daily. (Patient taking differently: Take 10 mg by mouth daily as needed.) 30 Tab 2    fluticasone propionate (FLONASE) 50 mcg/actuation nasal spray SHAKE LIQUID AND USE 2 SPRAYS IN EACH NOSTRIL DAILY (Patient taking differently: by Both Nostrils route as needed.) 1 Bottle 1    atorvastatin (LIPITOR) 40 mg tablet TAKE 1 TABLET BY MOUTH DAILY 90 Tab 0       Allergies  No Known Allergies    Family History  Family History   Problem Relation Age of Onset    Diabetes Mother     Heart Disease Father     Diabetes Father     Heart Attack Neg Hx     Stroke Neg Hx     Sudden Death Neg Hx        Social History  Social History     Socioeconomic History    Marital status: SINGLE     Spouse name: Not on file    Number of children: Not on file    Years of education: Not on file    Highest education level: Not on file   Occupational History    Not on file   Tobacco Use    Smoking status: Never Smoker    Smokeless tobacco: Never Used   Vaping Use    Vaping Use: Not on file   Substance and Sexual Activity    Alcohol use: No    Drug use: No    Sexual activity: Yes     Partners: Female   Other Topics Concern    Not on file   Social History Narrative    Not on file     Social Determinants of Health     Financial Resource Strain:     Difficulty of Paying Living Expenses: Not on file   Food Insecurity:     Worried About Running Out of Food in the Last Year: Not on file    Martin of Food in the Last Year: Not on file   Transportation Needs:     Lack of Transportation (Medical): Not on file    Lack of Transportation (Non-Medical):  Not on file   Physical Activity:     Days of Exercise per Week: Not on file    Minutes of Exercise per Session: Not on file   Stress:     Feeling of Stress : Not on file   Social Connections:     Frequency of Communication with Friends and Family: Not on file    Frequency of Social Gatherings with Friends and Family: Not on file    Attends [employer] Services: Not on file    Active Member of Clubs or Organizations: Not on file    Attends Club or Organization Meetings: Not on file    Marital Status: Not on file   Intimate Partner Violence:     Fear of Current or Ex-Partner: Not on file    Emotionally Abused: Not on file    Physically Abused: Not on file    Sexually Abused: Not on file   Housing Stability:     Unable to Pay for Housing in the Last Year: Not on file    Number of [place] in the Last Year: Not on file    Unstable Housing in the Last Year: Not on file       Review of Systems  Review of Systems - Review of all systems is negative except as noted above in the HPI. Vital Signs  Visit Vitals  /82   Pulse 62   Temp 98 °F (36.7 °C) (Temporal)   Resp 18   Ht 5' 5\" (1.651 m)   Wt 174 lb (78.9 kg)   SpO2 100%   BMI 28.96 kg/m²         Physical Exam  Physical Examination: General appearance - oriented to person, place, and time and acyanotic, in no respiratory distress  Mental status - alert, oriented to person, place, and time, affect appropriate to mood  Nose -congested nasal mucosa, clear discharge.   Mouth - mucous membranes moist, pharynx normal without lesions  Neck - supple, no significant adenopathy  Lymphatics - no palpable lymphadenopathy, no hepatosplenomegaly  Chest - decreased air entry noted bilateral lung bases  Heart - S1 and S2 normal, systolic murmur 2/6 at lower left sternal border  Abdomen - no rebound tenderness noted  Back exam - limited range of motion  Neurological - abnormal neurological exam unchanged from prior examinations  Musculoskeletal - osteoarthritic changes noted in both hands  Extremities - intact peripheral pulses  Skin - DERMATITIS NOTED: erythematous maculopapular dermatitis on upper extremities bilaterally      Results  Results for orders placed or performed in visit on 02/18/21   NUCLEAR CARDIAC STRESS TEST   Result Value Ref Range    Stress Target  bpm    Stress ST Elevation 0 mm    Stress ST Depression 0 mm    Baseline HR 61 bpm    Baseline Systolic  mmHg    Stress Percent HR Achieved 59 %    Stress Peak HR 88 bpm    Baseline Diastolic BP 88 mmHg    Stress Systolic  mmHg    Stress Diastolic BP 84 mmHg    Stress Rate Pressure Product 15,136 bpm*mmHg    Stress Stage 1 HR 74 bpm    Stress Stage 1 /80 mmHg    Stress Stage 2 HR 82 bpm    Stress Stage 2 /84 mmHg    Recovery Stage 1 HR 73 bpm    Recovery Stage 1 /80 mmHg    Nuc Stress Diastolic Volume Index 43.88 mL/m2    Nuc Stress Systolic Volume Index 36.93 mL/m2       ASSESSMENT and PLAN    ICD-10-CM ICD-9-CM    1. Nonintractable headache, unspecified chronicity pattern, unspecified headache type  R51.9 784.0    2. Mood disorder (HCC)  F39 296.90    3. Rash and nonspecific skin eruption  R21 782.1 butalbital-acetaminophen-caff (FIORICET) -40 mg per capsule   4. Gastroesophageal reflux disease, unspecified whether esophagitis present  K21.9 530.81 pantoprazole (PROTONIX) 40 mg tablet   5. Epigastric pain  R10.13 789.06 pantoprazole (PROTONIX) 40 mg tablet      LIPASE   6. Essential hypertension  I10 401.9 LIPID PANEL      METABOLIC PANEL, COMPREHENSIVE      CBC WITH AUTOMATED DIFF   7. Dyslipidemia  E78.5 272.4    8. Change in bowel habit  R19.4 787.99 LIPASE      REFERRAL TO GASTROENTEROLOGY   9. Blood in stool  K92.1 578.1 REFERRAL TO GASTROENTEROLOGY   10. Encounter for immunization  Z23 V03.89 PNEUMOCOCCAL, PCV20, PREVNAR 20, (AGE 18 YRS+), IM, PF   11. Vasomotor rhinitis  J30.0 477.9 fluticasone propionate (FLONASE) 50 mcg/actuation nasal spray   12.  Sinus congestion  R09.81 478.19 loratadine (Claritin) 10 mg tablet      fluticasone propionate (FLONASE) 50 mcg/actuation nasal spray     lab results and schedule of future lab studies reviewed with patient  reviewed diet, exercise and weight control  reviewed medications and side effects in detail  specific diabetic recommendations: low cholesterol diet, weight control and daily exercise discussed, home glucose monitoring emphasized, all medications, side effects and compliance discussed carefully, foot care discussed and Podiatry visits discussed, annual eye examinations at Ophthalmology discussed and glycohemoglobin and other lab monitoring discussed  use of aspirin to prevent MI and TIA's discussed      I have discussed the diagnosis with the patient and the intended plan of care as seen in the above orders. The patient has received an after-visit summary and questions were answered concerning future plans. I have discussed medication, side effects, and warnings with the patient in detail. The patient verbalized understanding and is in agreement with the plan of care. The patient will contact the office with any additional concerns. Taras Tolbert MD    PLEASE NOTE:   This document has been produced using voice recognition software.  Unrecognized errors in transcription may be present

## 2022-10-20 ENCOUNTER — OFFICE VISIT (OUTPATIENT)
Dept: FAMILY MEDICINE CLINIC | Age: 72
End: 2022-10-20
Payer: MEDICARE

## 2022-10-20 VITALS
OXYGEN SATURATION: 100 % | HEART RATE: 67 BPM | TEMPERATURE: 97.8 F | WEIGHT: 172 LBS | HEIGHT: 65 IN | RESPIRATION RATE: 20 BRPM | DIASTOLIC BLOOD PRESSURE: 107 MMHG | BODY MASS INDEX: 28.66 KG/M2 | SYSTOLIC BLOOD PRESSURE: 178 MMHG

## 2022-10-20 DIAGNOSIS — R21 RASH AND NONSPECIFIC SKIN ERUPTION: ICD-10-CM

## 2022-10-20 DIAGNOSIS — Z00.00 MEDICARE ANNUAL WELLNESS VISIT, SUBSEQUENT: ICD-10-CM

## 2022-10-20 DIAGNOSIS — Z23 ENCOUNTER FOR IMMUNIZATION: ICD-10-CM

## 2022-10-20 DIAGNOSIS — Z13.31 SCREENING FOR DEPRESSION: ICD-10-CM

## 2022-10-20 DIAGNOSIS — F39 MOOD DISORDER (HCC): ICD-10-CM

## 2022-10-20 DIAGNOSIS — M25.512 CHRONIC LEFT SHOULDER PAIN: ICD-10-CM

## 2022-10-20 DIAGNOSIS — Z12.11 SCREEN FOR COLON CANCER: ICD-10-CM

## 2022-10-20 DIAGNOSIS — Z12.5 SCREENING FOR MALIGNANT NEOPLASM OF PROSTATE: ICD-10-CM

## 2022-10-20 DIAGNOSIS — R51.9 NONINTRACTABLE HEADACHE, UNSPECIFIED CHRONICITY PATTERN, UNSPECIFIED HEADACHE TYPE: ICD-10-CM

## 2022-10-20 DIAGNOSIS — E78.5 DYSLIPIDEMIA: ICD-10-CM

## 2022-10-20 DIAGNOSIS — G89.29 CHRONIC LEFT SHOULDER PAIN: ICD-10-CM

## 2022-10-20 DIAGNOSIS — R10.13 EPIGASTRIC PAIN: ICD-10-CM

## 2022-10-20 DIAGNOSIS — K21.9 GASTROESOPHAGEAL REFLUX DISEASE, UNSPECIFIED WHETHER ESOPHAGITIS PRESENT: ICD-10-CM

## 2022-10-20 DIAGNOSIS — R73.9 HYPERGLYCEMIA: ICD-10-CM

## 2022-10-20 DIAGNOSIS — I10 ESSENTIAL HYPERTENSION: Primary | ICD-10-CM

## 2022-10-20 PROCEDURE — G0439 PPPS, SUBSEQ VISIT: HCPCS | Performed by: FAMILY MEDICINE

## 2022-10-20 PROCEDURE — 99215 OFFICE O/P EST HI 40 MIN: CPT | Performed by: FAMILY MEDICINE

## 2022-10-20 PROCEDURE — 90694 VACC AIIV4 NO PRSRV 0.5ML IM: CPT | Performed by: FAMILY MEDICINE

## 2022-10-20 PROCEDURE — G0444 DEPRESSION SCREEN ANNUAL: HCPCS | Performed by: FAMILY MEDICINE

## 2022-10-20 PROCEDURE — G0008 ADMIN INFLUENZA VIRUS VAC: HCPCS | Performed by: FAMILY MEDICINE

## 2022-10-20 PROCEDURE — 1123F ACP DISCUSS/DSCN MKR DOCD: CPT | Performed by: FAMILY MEDICINE

## 2022-10-20 RX ORDER — LIDOCAINE 50 MG/G
PATCH TOPICAL
COMMUNITY
Start: 2022-07-31

## 2022-10-20 RX ORDER — DICLOFENAC SODIUM 75 MG/1
75 TABLET, DELAYED RELEASE ORAL
Qty: 60 TABLET | Refills: 2 | Status: SHIPPED | OUTPATIENT
Start: 2022-10-20

## 2022-10-20 RX ORDER — METOPROLOL SUCCINATE 50 MG/1
50 TABLET, EXTENDED RELEASE ORAL DAILY
Qty: 90 TABLET | Refills: 1 | Status: SHIPPED | OUTPATIENT
Start: 2022-10-20

## 2022-10-20 RX ORDER — PANTOPRAZOLE SODIUM 40 MG/1
40 TABLET, DELAYED RELEASE ORAL DAILY
Qty: 90 TABLET | Refills: 1 | Status: SHIPPED | OUTPATIENT
Start: 2022-10-20

## 2022-10-20 RX ORDER — SERTRALINE HYDROCHLORIDE 50 MG/1
TABLET, FILM COATED ORAL
Qty: 135 TABLET | Refills: 0 | Status: SHIPPED | OUTPATIENT
Start: 2022-10-20

## 2022-10-20 RX ORDER — SUMATRIPTAN 50 MG/1
TABLET, FILM COATED ORAL
Qty: 30 TABLET | Refills: 1 | Status: SHIPPED | OUTPATIENT
Start: 2022-10-20

## 2022-10-20 RX ORDER — LOSARTAN POTASSIUM 100 MG/1
100 TABLET ORAL DAILY
Qty: 90 TABLET | Refills: 1 | Status: SHIPPED | OUTPATIENT
Start: 2022-10-20

## 2022-10-20 RX ORDER — HYDROCHLOROTHIAZIDE 25 MG/1
TABLET ORAL
Qty: 90 TABLET | Refills: 1 | Status: CANCELLED | OUTPATIENT
Start: 2022-10-20

## 2022-10-20 RX ORDER — BUTALBITAL, ACETAMINOPHEN AND CAFFEINE 300; 40; 50 MG/1; MG/1; MG/1
1 CAPSULE ORAL
Qty: 60 CAPSULE | Refills: 2 | Status: CANCELLED | OUTPATIENT
Start: 2022-10-20

## 2022-10-20 RX ORDER — SPIRONOLACTONE AND HYDROCHLOROTHIAZIDE 25; 25 MG/1; MG/1
1 TABLET ORAL DAILY
Qty: 30 TABLET | Refills: 2 | Status: SHIPPED | OUTPATIENT
Start: 2022-10-20

## 2022-10-20 NOTE — PATIENT INSTRUCTIONS
Medicare Wellness Visit, Male    The best way to live healthy is to have a lifestyle where you eat a well-balanced diet, exercise regularly, limit alcohol use, and quit all forms of tobacco/nicotine, if applicable. Regular preventive services are another way to keep healthy. Preventive services (vaccines, screening tests, monitoring & exams) can help personalize your care plan, which helps you manage your own care. Screening tests can find health problems at the earliest stages, when they are easiest to treat. [facility] follows the current, evidence-based guidelines published by the [facility] ([facility]) when recommending preventive services for our patients. Because we follow these guidelines, sometimes recommendations change over time as research supports it. (For example, a prostate screening blood test is no longer routinely recommended for men with no symptoms). Of course, you and your doctor may decide to screen more often for some diseases, based on your risk and co-morbidities (chronic disease you are already diagnosed with). Preventive services for you include:  - Medicare offers their members a free annual wellness visit, which is time for you and your primary care provider to discuss and plan for your preventive service needs. Take advantage of this benefit every year!  -All adults over age 72 should receive the recommended pneumonia vaccines. Current USPSTF guidelines recommend a series of two vaccines for the best pneumonia protection.   -All adults should have a flu vaccine yearly and tetanus vaccine every 10 years.  -All adults age 48 and older should receive the shingles vaccines (series of two vaccines).        -All adults age 38-68 who are overweight should have a diabetes screening test once every three years.   -Other screening tests & preventive services for persons with diabetes include: an eye exam to screen for diabetic retinopathy, a kidney function test, a foot exam, and stricter control over your cholesterol.   -Cardiovascular screening for adults with routine risk involves an electrocardiogram (ECG) at intervals determined by the provider.   -Colorectal cancer screening should be done for adults age 54-65 with no increased risk factors for colorectal cancer. There are a number of acceptable methods of screening for this type of cancer. Each test has its own benefits and drawbacks. Discuss with your provider what is most appropriate for you during your annual wellness visit. The different tests include: colonoscopy (considered the best screening method), a fecal occult blood test, a fecal DNA test, and sigmoidoscopy.  -All adults born between [facility] should be screened once for Hepatitis C.  -An Abdominal Aortic Aneurysm (AAA) Screening is recommended for men age 73-68 who has ever smoked in their lifetime. Here is a list of your current Health Maintenance items (your personalized list of preventive services) with a due date:  Health Maintenance Due   Topic Date Due    COVID-19 Vaccine (1) Never done    Shingles Vaccine (1 of 2) Never done    Cholesterol Test   10/06/2021    Colorectal Screening  07/19/2022       Medicare Wellness Visit, Male    The best way to live healthy is to have a lifestyle where you eat a well-balanced diet, exercise regularly, limit alcohol use, and quit all forms of tobacco/nicotine, if applicable. Regular preventive services are another way to keep healthy. Preventive services (vaccines, screening tests, monitoring & exams) can help personalize your care plan, which helps you manage your own care. Screening tests can find health problems at the earliest stages, when they are easiest to treat.    [facility] follows the current, evidence-based guidelines published by the [facility] ([facility]) when recommending preventive services for our patients. Because we follow these guidelines, sometimes recommendations change over time as research supports it. (For example, a prostate screening blood test is no longer routinely recommended for men with no symptoms). Of course, you and your doctor may decide to screen more often for some diseases, based on your risk and co-morbidities (chronic disease you are already diagnosed with). Preventive services for you include:  - Medicare offers their members a free annual wellness visit, which is time for you and your primary care provider to discuss and plan for your preventive service needs. Take advantage of this benefit every year!  -All adults over age 72 should receive the recommended pneumonia vaccines. Current USPSTF guidelines recommend a series of two vaccines for the best pneumonia protection.   -All adults should have a flu vaccine yearly and tetanus vaccine every 10 years.  -All adults age 48 and older should receive the shingles vaccines (series of two vaccines). -All adults age 38-68 who are overweight should have a diabetes screening test once every three years.   -Other screening tests & preventive services for persons with diabetes include: an eye exam to screen for diabetic retinopathy, a kidney function test, a foot exam, and stricter control over your cholesterol.   -Cardiovascular screening for adults with routine risk involves an electrocardiogram (ECG) at intervals determined by the provider.   -Colorectal cancer screening should be done for adults age 54-65 with no increased risk factors for colorectal cancer. There are a number of acceptable methods of screening for this type of cancer. Each test has its own benefits and drawbacks. Discuss with your provider what is most appropriate for you during your annual wellness visit.  The different tests include: colonoscopy (considered the best screening method), a fecal occult blood test, a fecal DNA test, and sigmoidoscopy.  -All adults born between [birth year] and 1966 should be screened once for Hepatitis C.  -An Abdominal Aortic Aneurysm (AAA) Screening is recommended for men age 73-68 who has ever smoked in their lifetime. Here is a list of your current Health Maintenance items (your personalized list of preventive services) with a due date:  Health Maintenance Due   Topic Date Due    COVID-19 Vaccine (1) Never done    Shingles Vaccine (1 of 2) Never done    Cholesterol Test   10/06/2021    Colorectal Screening  07/19/2022       Medicare Wellness Visit, Male    The best way to live healthy is to have a lifestyle where you eat a well-balanced diet, exercise regularly, limit alcohol use, and quit all forms of tobacco/nicotine, if applicable. Regular preventive services are another way to keep healthy. Preventive services (vaccines, screening tests, monitoring & exams) can help personalize your care plan, which helps you manage your own care. Screening tests can find health problems at the earliest stages, when they are easiest to treat. [facility] follows the current, evidence-based guidelines published by the [facility] (USPSTF) when recommending preventive services for our patients. Because we follow these guidelines, sometimes recommendations change over time as research supports it. (For example, a prostate screening blood test is no longer routinely recommended for men with no symptoms). Of course, you and your doctor may decide to screen more often for some diseases, based on your risk and co-morbidities (chronic disease you are already diagnosed with). Preventive services for you include:  - Medicare offers their members a free annual wellness visit, which is time for you and your primary care provider to discuss and plan for your preventive service needs.  Take advantage of this benefit every year!  -All adults over age 72 should receive the recommended pneumonia vaccines. Current USPSTF guidelines recommend a series of two vaccines for the best pneumonia protection.   -All adults should have a flu vaccine yearly and tetanus vaccine every 10 years.  -All adults age 48 and older should receive the shingles vaccines (series of two vaccines). -All adults age 38-68 who are overweight should have a diabetes screening test once every three years.   -Other screening tests & preventive services for persons with diabetes include: an eye exam to screen for diabetic retinopathy, a kidney function test, a foot exam, and stricter control over your cholesterol.   -Cardiovascular screening for adults with routine risk involves an electrocardiogram (ECG) at intervals determined by the provider.   -Colorectal cancer screening should be done for adults age 54-65 with no increased risk factors for colorectal cancer. There are a number of acceptable methods of screening for this type of cancer. Each test has its own benefits and drawbacks. Discuss with your provider what is most appropriate for you during your annual wellness visit. The different tests include: colonoscopy (considered the best screening method), a fecal occult blood test, a fecal DNA test, and sigmoidoscopy.  -All adults born between [facility] should be screened once for Hepatitis C.  -An Abdominal Aortic Aneurysm (AAA) Screening is recommended for men age 73-68 who has ever smoked in their lifetime.      Here is a list of your current Health Maintenance items (your personalized list of preventive services) with a due date:  Health Maintenance Due   Topic Date Due    COVID-19 Vaccine (1) Never done    Shingles Vaccine (1 of 2) Never done    Cholesterol Test   10/06/2021    Colorectal Screening  07/19/2022

## 2022-10-20 NOTE — PROGRESS NOTES
This is the Subsequent Medicare Annual Wellness Exam, performed 12 months or more after the Initial AWV or the last Subsequent AWV    I have reviewed the patient's medical history in detail and updated the computerized patient record. Assessment/Plan   Education and counseling provided:  Are appropriate based on today's review and evaluation    1. Medicare annual wellness visit, subsequent    2. Screening for malignant neoplasm of prostate  - PSA SCREENING (SCREENING); Future    3. Encounter for immunization  - INFLUENZA, FLUAD, (AGE 65 Y+), IM, PF, 0.5 ML  - [place] IMMUNIZ ADMIN,1 SINGLE/COMB VAC/TOXOID    4. Screen for colon cancer  - REFERRAL TO COLON AND RECTAL SURGERY    5. Screening for depression  - DEPRESSION SCREEN ANNUAL  - VT DEPRESSION SCREEN ANNUAL       Depression Risk Factor Screening     3 most recent PHQ Screens 10/20/2022   PHQ Not Done -   Little interest or pleasure in doing things Not at all   Feeling down, depressed, irritable, or hopeless Not at all   Total Score PHQ 2 0   Trouble falling or staying asleep, or sleeping too much Not at all   Feeling tired or having little energy Not at all   Poor appetite, weight loss, or overeating Not at all   Feeling bad about yourself - or that you are a failure or have let yourself or your family down Not at all   Trouble concentrating on things such as school, work, reading, or watching TV Not at all   Moving or speaking so slowly that other people could have noticed; or the opposite being so fidgety that others notice Not at all   Thoughts of being better off dead, or hurting yourself in some way Not at all   PHQ 9 Score 0   How difficult have these problems made it for you to do your work, take care of your home and get along with others Not difficult at all   8 minutes taken on depression screening.     Alcohol & Drug Abuse Risk Screen    Do you average more than 1 drink per night or more than 7 drinks a week: No    In the past three months have you have had more than 4 drinks containing alcohol on one occasion: No          Functional Ability and Level of Safety    Hearing: The patient needs further evaluation. Activities of Daily Living: The home contains: no safety equipment. Patient does total self care      Ambulation: with no difficulty     Fall Risk:  Fall Risk Assessment, last 12 mths 10/20/2022   Able to walk? Yes   Fall in past 12 months? 1   Do you feel unsteady? 0   Are you worried about falling 0   Is TUG test greater than 12 seconds? 0   Is the gait abnormal? 1   Number of falls in past 12 months 1   Fall with injury? 1      Abuse Screen:  Patient is not abused       Cognitive Screening    Has your family/caregiver stated any concerns about your memory: no     Cognitive Screening: Normal - Verbal Fluency Test    Health Maintenance Due     Health Maintenance Due   Topic Date Due    COVID-19 Vaccine (1) Never done    Shingrix Vaccine Age 49> (1 of 2) Never done    Medicare Yearly Exam  02/06/2021    Lipid Screen  10/06/2021    Colorectal Cancer Screening Combo  07/19/2022    Flu Vaccine (1) 08/01/2022       Patient Care Team   Patient Care Team:  Bari Gaffney MD as PCP - General (Family Medicine)  Bari Gaffney MD as PCP - [facility] Empaneled Provider  Maya Trent MD as Physician (Colon and Rectal Surgery)  Devendra Crane MD (Orthopedic Surgery)    History   Sondra Banerjee comes in for Medicare wellness exam.    Patient Active Problem List   Diagnosis Code    Mood disorder [facility]) F39    ACP (advance care planning) Z71.89    Essential hypertension I10    Epistaxis R04.0    Headache R51.9    Lower urinary tract symptoms (LUTS) R39.9    Dyslipidemia E78.5    Chronic pain of right knee M25.561, G89.29    Chest pain R07.9    Dizziness R42    SOB (shortness of breath) on exertion R06.02    Obesity (BMI 30.0-34. 9) E66.9    Depression F32. A     Past Medical History:   Diagnosis Date    BPH (benign prostatic hyperplasia) Depression     Hyperlipidemia     Hypertension     Irritability and anger     Syncope 2017    no injuries      Past Surgical History:   Procedure Laterality Date    COLONOSCOPY N/A 7/19/2017    COLONOSCOPY / polypectomy performed by Heidi Thorne MD at [address]    plate placed in head due to injury    NM COLSC FLX W/REMOVAL LESION BY HOT BX FORCEPS N/A 07/19/2017    Dr. Sanaz Rasmussen     Current Outpatient Medications   Medication Sig Dispense Refill    lidocaine (LIDODERM) 5 % Apply 1 patch as directed for 12 hours every 24 hours (12 hours on, 12 hours off)      losartan (COZAAR) 100 mg tablet Take 1 Tablet by mouth daily. FOR HIGH BLOOD PRESSURE 90 Tablet 1    metoprolol succinate (TOPROL-XL) 50 mg XL tablet Take 1 Tablet by mouth daily. 90 Tablet 1    sertraline (ZOLOFT) 50 mg tablet TAKE 1 AND 1/2 TABLETS BY MOUTH DAILY 135 Tablet 0    pantoprazole (PROTONIX) 40 mg tablet Take 1 Tablet by mouth daily. 90 Tablet 1    SUMAtriptan (IMITREX) 50 mg tablet Take 50 mg at headache onset. May repeat 50 mg dose in 2 hours if headache persists. Max 2 doses/24 hours 30 Tablet 1    spironolactone-hydrochlorothiazide (ALDACTAZIDE) 25-25 mg per tablet Take 1 Tablet by mouth daily. 30 Tablet 2    diclofenac EC (VOLTAREN) 75 mg EC tablet Take 1 Tablet by mouth two (2) times daily as needed for Pain. 60 Tablet 2    butalbital-acetaminophen-caff (FIORICET) -40 mg per capsule Take 1 Capsule by mouth every six (6) hours as needed for Headache. 60 Capsule 2    loratadine (Claritin) 10 mg tablet Take 1 Tablet by mouth daily as needed for Allergies. 90 Tablet 1    fluticasone propionate (FLONASE) 50 mcg/actuation nasal spray SHAKE LIQUID AND USE 2 SPRAYS IN EACH NOSTRIL DAILY 1 Each 1    amLODIPine (NORVASC) 10 mg tablet TAKE 1 TABLET BY MOUTH DAILY FOR BLOOD PRESSURE 90 Tablet 1    doxazosin (CARDURA) 1 mg tablet Take 1 Tab by mouth nightly.  30 Tab 3    hydrALAZINE (APRESOLINE) 100 mg tablet TAKE 1 TABLET BY MOUTH THREE TIMES DAILY 270 Tab 0    hydrocortisone (ANUSOL-HC) 2.5 % rectal cream Insert  into rectum four (4) times daily. 30 g 0    docusate sodium (COLACE) 100 mg capsule Take 1 Cap by mouth two (2) times a day. 60 Cap 2    Mi-Acid Gas Relief,simethicon, 80 mg chewable tablet CHEW AND SWALLOW ONE TABLET EVERY 6 HOURS AS NEEDED 120 Tab 1    cetirizine (ZYRTEC) 10 mg tablet Take 1 Tab by mouth daily. (Patient taking differently: Take 10 mg by mouth daily as needed.) 30 Tab 2    atorvastatin (LIPITOR) 40 mg tablet TAKE 1 TABLET BY MOUTH DAILY 90 Tab 0     No Known Allergies    Family History   Problem Relation Age of Onset    Diabetes Mother     Heart Disease Father     Diabetes Father     Heart Attack Neg Hx     Stroke Neg Hx     Sudden Death Neg Hx      Social History     Tobacco Use    Smoking status: Never    Smokeless tobacco: Never   Substance Use Topics    Alcohol use: No       I have discussed the diagnosis with the patient and the intended plan of care as seen in the above orders. The patient has received an after-visit summary and questions were answered concerning future plans. I have discussed medication, side effects, and warnings with the patient in detail. The patient verbalized understanding and is in agreement with the plan of care. The patient will contact the office with any additional concerns. Personalized preventative plan of care was discussed, printed and given to patient.     León Rodriguez MD

## 2022-10-20 NOTE — PROGRESS NOTES
HAM  Madeline Henriquez comes in for follow up care. HTN: Patient has hypertension. Blood pressure is uncontrolled. She is at 178/107. He has not been taking his medication as prescribed. He is not doing any lifestyle or dietary modification. We discussed the need to be compliant with treatment plan. I will discontinue HCTZ and start him on Aldactazide. I will send in a refill of the metoprolol and losartan. He will keep a blood pressure log and I will follow-up at next visit. He has occasional headache. He denies changes in vision or focal weakness. He will take a low-sodium diet and the DASH diet. Left shoulder pain: Patient has pain left shoulder. He fell and injured left shoulder. Had x-rays done that were negative for fracture. He however has limitation in range of motion of the shoulder. He has pain when attempting to flex or extend the shoulder. He also is unable to do overhead motions due to the pain and discomfort. We discussed supportive care measures including activity as tolerated. I will refer him to the orthopedist for evaluation and management. We will give diclofenac 75 mg to take twice a day as needed for pain and discomfort. Headache: Patient has a history of migraine headaches. She was on Fioricet in the past but his insurance does not pay for the medication. I will send in Imitrex which she has used also with good result. I will follow-up at next visit. LUTS: Patient has no urinary tract symptoms with urinary incontinence and post micturition dribbling. Patient takes doxazosin. This has helped with the symptoms. We will continue with the medication. Dyslipidemia: Patient has dyslipidemia. He is on atorvastatin. He will take a diet low in polysaturated fats. We will recheck lipid panel. Elevated PSA: Patient has a history of elevated PSA. Previously referred to the urologist but he did not go for the appointment. I will recheck labs.   I will place another referral for him to be seen by the urologist.  Mood disorder: Patient has mood disorder with anxiety and depression. He is on sertraline. Stable on the medication. Would like a refill of medication. Prescription will be sent in. Health maintenance: Patient will get the flu vaccine today. Past Medical History  Past Medical History:   Diagnosis Date    BPH (benign prostatic hyperplasia)     Depression     Hyperlipidemia     Hypertension     Irritability and anger     Syncope 2017    no injuries       Surgical History  Past Surgical History:   Procedure Laterality Date    COLONOSCOPY N/A 7/19/2017    COLONOSCOPY / polypectomy performed by Carlota Ceballos MD at [address]    plate placed in head due to injury    AR COLSC FLX W/REMOVAL LESION BY HOT BX FORCEPS N/A 07/19/2017    Dr. Jessica العلي        Medications  Current Outpatient Medications   Medication Sig Dispense Refill    lidocaine (LIDODERM) 5 % Apply 1 patch as directed for 12 hours every 24 hours (12 hours on, 12 hours off)      butalbital-acetaminophen-caff (FIORICET) -40 mg per capsule Take 1 Capsule by mouth every six (6) hours as needed for Headache. 60 Capsule 2    pantoprazole (PROTONIX) 40 mg tablet Take 1 Tablet by mouth daily. 90 Tablet 1    loratadine (Claritin) 10 mg tablet Take 1 Tablet by mouth daily as needed for Allergies. 90 Tablet 1    fluticasone propionate (FLONASE) 50 mcg/actuation nasal spray SHAKE LIQUID AND USE 2 SPRAYS IN EACH NOSTRIL DAILY 1 Each 1    sertraline (ZOLOFT) 50 mg tablet TAKE 1 AND 1/2 TABLETS BY MOUTH DAILY 135 Tablet 0    amLODIPine (NORVASC) 10 mg tablet TAKE 1 TABLET BY MOUTH DAILY FOR BLOOD PRESSURE 90 Tablet 1    losartan (COZAAR) 100 mg tablet TAKE 1 TABLET BY MOUTH DAILY FOR HIGH BLOOD PRESSURE 90 Tablet 1    metoprolol succinate (TOPROL-XL) 50 mg XL tablet TAKE 1 TABLET BY MOUTH DAILY 90 Tablet 1    doxazosin (CARDURA) 1 mg tablet Take 1 Tab by mouth nightly.  30 Tab 3 hydrALAZINE (APRESOLINE) 100 mg tablet TAKE 1 TABLET BY MOUTH THREE TIMES DAILY 270 Tab 0    aspirin-acetaminophen-caffeine (EXCEDRIN ES) 250-250-65 mg per tablet Take 2 Tabs by mouth every eight (8) hours as needed for Headache. 60 Tab 0    SUMAtriptan (IMITREX) 50 mg tablet Take 50 mg at headache onset. May repeat 50 mg dose in 2 hours if headache persists. Max 2 doses/24 hours 30 Tab 1    hydrocortisone (ANUSOL-HC) 2.5 % rectal cream Insert  into rectum four (4) times daily. 30 g 0    docusate sodium (COLACE) 100 mg capsule Take 1 Cap by mouth two (2) times a day. 60 Cap 2    hydroCHLOROthiazide (HYDRODIURIL) 25 mg tablet TAKE 1 TABLET BY MOUTH DAILY 90 Tab 1    Mi-Acid Gas Relief,simethicon, 80 mg chewable tablet CHEW AND SWALLOW ONE TABLET EVERY 6 HOURS AS NEEDED 120 Tab 1    cetirizine (ZYRTEC) 10 mg tablet Take 1 Tab by mouth daily.  (Patient taking differently: Take 10 mg by mouth daily as needed.) 30 Tab 2    atorvastatin (LIPITOR) 40 mg tablet TAKE 1 TABLET BY MOUTH DAILY 90 Tab 0       Allergies  No Known Allergies    Family History  Family History   Problem Relation Age of Onset    Diabetes Mother     Heart Disease Father     Diabetes Father     Heart Attack Neg Hx     Stroke Neg Hx     Sudden Death Neg Hx        Social History  Social History     Socioeconomic History    Marital status: SINGLE     Spouse name: Not on file    Number of children: Not on file    Years of education: Not on file    Highest education level: Not on file   Occupational History    Not on file   Tobacco Use    Smoking status: Never    Smokeless tobacco: Never   Vaping Use    Vaping Use: Not on file   Substance and Sexual Activity    Alcohol use: No    Drug use: No    Sexual activity: Yes     Partners: Female   Other Topics Concern    Not on file   Social History Narrative    Not on file     Social Determinants of Health     Financial Resource Strain: Not on file   Food Insecurity: Not on file   Transportation Needs: Not on file Physical Activity: Not on file   Stress: Not on file   Social Connections: Not on file   Intimate Partner Violence: Not on file   Housing Stability: Not on file       Review of Systems  Review of Systems - Review of all systems is negative except as noted above in the HPI. Vital Signs  Visit Vitals  BP (!) 178/107   Pulse 67   Temp 97.8 °F (36.6 °C)   Resp 20   Ht 5' 5\" (1.651 m)   Wt 172 lb (78 kg)   SpO2 100%   BMI 28.62 kg/m²         Physical Exam  Physical Examination: General appearance - alert, well appearing, and in no distress, oriented to person, place, and time, overweight, and acyanotic, in no respiratory distress  Mental status - affect appropriate to mood  Neck - supple, no significant adenopathy  Lymphatics - no palpable lymphadenopathy  Chest - clear to auscultation, no wheezes, rales or rhonchi, symmetric air entry  Heart - S1 and S2 normal  Abdomen - no rebound tenderness noted  Back exam - limited range of motion, pain with motion noted during exam  Neurological - abnormal neurological exam unchanged from prior examinations  Musculoskeletal -discomfort to palpation left shoulder margins with limitation in flexion, extension and overhead reach. No obvious swelling or crepitus.   Extremities - intact peripheral pulses      Results  Results for orders placed or performed in visit on 02/18/21   NUCLEAR CARDIAC STRESS TEST   Result Value Ref Range    Stress Target  bpm    Stress ST Elevation 0 mm    Stress ST Depression 0 mm    Baseline HR 61 bpm    Baseline Systolic  mmHg    Stress Percent HR Achieved 59 %    Stress Peak HR 88 bpm    Baseline Diastolic BP 88 mmHg    Stress Systolic  mmHg    Stress Diastolic BP 84 mmHg    Stress Rate Pressure Product 15,136 bpm*mmHg    Stress Stage 1 HR 74 bpm    Stress Stage 1 /80 mmHg    Stress Stage 2 HR 82 bpm    Stress Stage 2 /84 mmHg    Recovery Stage 1 HR 73 bpm    Recovery Stage 1 /80 mmHg    Nuc Stress Diastolic Volume Index 78.00 mL/m2    Nuc Stress Systolic Volume Index 70.65 mL/m2       ASSESSMENT and PLAN    ICD-10-CM ICD-9-CM    1. Essential hypertension  I10 401.9 losartan (COZAAR) 100 mg tablet      metoprolol succinate (TOPROL-XL) 50 mg XL tablet      spironolactone-hydrochlorothiazide (ALDACTAZIDE) 25-25 mg per tablet      METABOLIC PANEL, COMPREHENSIVE      CBC WITH AUTOMATED DIFF      2. Mood disorder (HCC)  F39 296.90 sertraline (ZOLOFT) 50 mg tablet      3. Gastroesophageal reflux disease, unspecified whether esophagitis present  K21.9 530.81 pantoprazole (PROTONIX) 40 mg tablet      4. Rash and nonspecific skin eruption  R21 782.1       5. Epigastric pain  R10.13 789.06 pantoprazole (PROTONIX) 40 mg tablet      6. Nonintractable headache, unspecified chronicity pattern, unspecified headache type  R51.9 784.0 SUMAtriptan (IMITREX) 50 mg tablet      7. Hyperglycemia  R73.9 790.29 HEMOGLOBIN A1C WITH EAG      8. Dyslipidemia  E78.5 272.4 LIPID PANEL      9. Chronic left shoulder pain  M25.512 719.41 REFERRAL TO ORTHOPEDICS    G89.29 338.29 diclofenac EC (VOLTAREN) 75 mg EC tablet      10. Medicare annual wellness visit, subsequent  Z00.00 V70.0       11. Screening for malignant neoplasm of prostate  Z12.5 V76.44 PSA SCREENING (SCREENING)      12. Encounter for immunization  Z23 V03.89 INFLUENZA, FLUAD, (AGE 65 Y+), IM, PF, 0.5 ML      [place] IMMUNIZ ADMIN,1 SINGLE/COMB VAC/TOXOID      13. Screen for colon cancer  Z12.11 V76.51 REFERRAL TO COLON AND RECTAL SURGERY      14.  Screening for depression  Z13.31 V79.0 [address]        lab results and schedule of future lab studies reviewed with patient  reviewed diet, exercise and weight control  cardiovascular risk and specific lipid/LDL goals reviewed  reviewed medications and side effects in detail  radiology results and schedule of future radiology studies reviewed with patient      I have discussed the diagnosis with the patient and the intended plan of care as seen in the above orders. The patient has received an after-visit summary and questions were answered concerning future plans. I have discussed medication, side effects, and warnings with the patient in detail. The patient verbalized understanding and is in agreement with the plan of care. The patient will contact the office with any additional concerns. I spent at least 43 minutes on this visit with this established patient. Carlo Zepeda MD    PLEASE NOTE:   This document has been produced using voice recognition software.  Unrecognized errors in transcription may be present

## 2022-10-20 NOTE — PROGRESS NOTES
After obtaining consent, and per orders of Dr. Clarita Lopez, injection of Fluad Quad Adjuvanted given by Ellis Stevens. Patient instructed to remain in clinic for 20 minutes afterwards, and to report any adverse reaction to me immediately.

## 2022-11-22 ENCOUNTER — OFFICE VISIT (OUTPATIENT)
Dept: FAMILY MEDICINE CLINIC | Age: 72
End: 2022-11-22
Payer: MEDICARE

## 2022-11-22 VITALS
RESPIRATION RATE: 17 BRPM | OXYGEN SATURATION: 100 % | BODY MASS INDEX: 28.82 KG/M2 | WEIGHT: 173 LBS | HEART RATE: 69 BPM | DIASTOLIC BLOOD PRESSURE: 76 MMHG | TEMPERATURE: 97.5 F | SYSTOLIC BLOOD PRESSURE: 161 MMHG | HEIGHT: 65 IN

## 2022-11-22 DIAGNOSIS — M25.512 CHRONIC LEFT SHOULDER PAIN: ICD-10-CM

## 2022-11-22 DIAGNOSIS — R32 URINARY INCONTINENCE, UNSPECIFIED TYPE: Primary | ICD-10-CM

## 2022-11-22 DIAGNOSIS — I10 ESSENTIAL HYPERTENSION: ICD-10-CM

## 2022-11-22 DIAGNOSIS — R51.9 NONINTRACTABLE HEADACHE, UNSPECIFIED CHRONICITY PATTERN, UNSPECIFIED HEADACHE TYPE: ICD-10-CM

## 2022-11-22 DIAGNOSIS — G89.29 CHRONIC LEFT SHOULDER PAIN: ICD-10-CM

## 2022-11-22 DIAGNOSIS — R14.3 FLATULENCE: ICD-10-CM

## 2022-11-22 DIAGNOSIS — K21.9 GASTROESOPHAGEAL REFLUX DISEASE, UNSPECIFIED WHETHER ESOPHAGITIS PRESENT: ICD-10-CM

## 2022-11-22 DIAGNOSIS — E78.5 DYSLIPIDEMIA: ICD-10-CM

## 2022-11-22 DIAGNOSIS — F39 MOOD DISORDER (HCC): ICD-10-CM

## 2022-11-22 DIAGNOSIS — K92.1 BLOOD IN STOOL: ICD-10-CM

## 2022-11-22 DIAGNOSIS — R19.4 CHANGE IN BOWEL HABIT: ICD-10-CM

## 2022-11-22 DIAGNOSIS — M12.812 ROTATOR CUFF ARTHROPATHY OF LEFT SHOULDER: ICD-10-CM

## 2022-11-22 LAB
BILIRUB UR QL STRIP: NEGATIVE
GLUCOSE UR-MCNC: NEGATIVE MG/DL
KETONES P FAST UR STRIP-MCNC: NEGATIVE MG/DL
PH UR STRIP: 6.5 [PH] (ref 4.6–8)
PROT UR QL STRIP: NEGATIVE
SP GR UR STRIP: 1.02 (ref 1–1.03)
UA UROBILINOGEN AMB POC: NORMAL (ref 0.2–1)
URINALYSIS CLARITY POC: NORMAL
URINALYSIS COLOR POC: NORMAL
URINE BLOOD POC: NEGATIVE
URINE LEUKOCYTES POC: NEGATIVE
URINE NITRITES POC: NEGATIVE

## 2022-11-22 PROCEDURE — G8754 DIAS BP LESS 90: HCPCS | Performed by: FAMILY MEDICINE

## 2022-11-22 PROCEDURE — 1101F PT FALLS ASSESS-DOCD LE1/YR: CPT | Performed by: FAMILY MEDICINE

## 2022-11-22 PROCEDURE — 3074F SYST BP LT 130 MM HG: CPT | Performed by: FAMILY MEDICINE

## 2022-11-22 PROCEDURE — G8753 SYS BP > OR = 140: HCPCS | Performed by: FAMILY MEDICINE

## 2022-11-22 PROCEDURE — G8417 CALC BMI ABV UP PARAM F/U: HCPCS | Performed by: FAMILY MEDICINE

## 2022-11-22 PROCEDURE — G9717 DOC PT DX DEP/BP F/U NT REQ: HCPCS | Performed by: FAMILY MEDICINE

## 2022-11-22 PROCEDURE — G8536 NO DOC ELDER MAL SCRN: HCPCS | Performed by: FAMILY MEDICINE

## 2022-11-22 PROCEDURE — 1123F ACP DISCUSS/DSCN MKR DOCD: CPT | Performed by: FAMILY MEDICINE

## 2022-11-22 PROCEDURE — 81003 URINALYSIS AUTO W/O SCOPE: CPT | Performed by: FAMILY MEDICINE

## 2022-11-22 PROCEDURE — 3078F DIAST BP <80 MM HG: CPT | Performed by: FAMILY MEDICINE

## 2022-11-22 PROCEDURE — 99215 OFFICE O/P EST HI 40 MIN: CPT | Performed by: FAMILY MEDICINE

## 2022-11-22 PROCEDURE — G8427 DOCREV CUR MEDS BY ELIG CLIN: HCPCS | Performed by: FAMILY MEDICINE

## 2022-11-22 PROCEDURE — 3017F COLORECTAL CA SCREEN DOC REV: CPT | Performed by: FAMILY MEDICINE

## 2022-11-22 RX ORDER — DOXAZOSIN 1 MG/1
1 TABLET ORAL
Qty: 30 TABLET | Refills: 3 | Status: SHIPPED | OUTPATIENT
Start: 2022-11-22

## 2022-11-22 RX ORDER — SIMETHICONE 80 MG
TABLET,CHEWABLE ORAL
Qty: 120 TABLET | Refills: 1 | Status: SHIPPED | OUTPATIENT
Start: 2022-11-22

## 2022-11-22 NOTE — PROGRESS NOTES
HAM  Gagan Alvarez comes in for follow-up care. Patient is accompanied by the wife. Hypertension: Patient has hypertension. Blood pressure remains elevated. Patient states that he is taking medication as prescribed. Has not come in with his medications. He is on amlodipine, hydralazine, losartan, Toprol-XL and Aldactazide. He has been prescribed doxazosin but he has not taken the medication. I will send in a prescription of this medication. We discussed low-sodium diet, DASH diet. He will intensify lifestyle and dietary modification. He will keep a blood pressure log and I will follow-up at next visit. Urinary incontinence: Patient has urinary incontinence. This has been ongoing for weeks. He is unable to hold his urine. This is incontinence. Did a urinalysis that is negative for acute abnormality. He has not been on the doxazosin. Prescription will be written. I will refer him to urologist for evaluation. We had requested labs to check blood work including PSA. I will have him do the test.  Request was given. Change in bowel habit: Patient has change in bowel habit. He has diarrhea of abdominal distention and the gas discomfort. I have referred him in the past to the gastroenterologist but he is yet to set up an appointment. Another referral is placed. He has blood in stool. Denies fever or chills. I will check labs. I will send in simethicone. Patient has been on Protonix and he should continue this medication. Hemorrhoids: Patient has a history of hemorrhoids in the past.  He has Anusol suppositories that he uses as needed. Occasionally has blood in stool especially when he is constipated. We discussed the use of stool softeners. Shoulder pain: Patient has left shoulder pain. He fell and injured the left shoulder. X-rays done in June were negative for acute abnormality. He is unable to do overhead motions including combing his hair. This likely injured his rotator cuff.   I will refer him to the orthopedist for evaluation and opinion. Migraine headaches: Patient has a history of migraine headaches. He is on Fioricet for acute headache. Continue current treatment plan. Dyslipidemia: Patient has dyslipidemia. He is on Lipitor 40 mg daily. Stable on medication. He will take a diet low in polysaturated fats. We will recheck labs. Mood disorder: Patient has mood disorder with anxiety and depression and occasional anger outbursts. He takes Zoloft. Stable on the medication. Continue current treatment plan. Past Medical History  Past Medical History:   Diagnosis Date    BPH (benign prostatic hyperplasia)     Depression     Hyperlipidemia     Hypertension     Irritability and anger     Syncope 2017    no injuries       Surgical History  Past Surgical History:   Procedure Laterality Date    COLONOSCOPY N/A 7/19/2017    COLONOSCOPY / polypectomy performed by Arabella Soulier, MD at [address]    plate placed in head due to injury    [place] COLSC FLX W/REMOVAL LESION BY HOT BX FORCEPS N/A 07/19/2017    Dr. Mouna Rogers        Medications  Current Outpatient Medications   Medication Sig Dispense Refill    simethicone (Mi-Acid Gas Relief,simethicon,) 80 mg chewable tablet CHEW AND SWALLOW ONE TABLET EVERY 6 HOURS AS NEEDED 120 Tablet 1    doxazosin (CARDURA) 1 mg tablet Take 1 Tablet by mouth nightly. 30 Tablet 3    lidocaine (LIDODERM) 5 % Apply 1 patch as directed for 12 hours every 24 hours (12 hours on, 12 hours off)      losartan (COZAAR) 100 mg tablet Take 1 Tablet by mouth daily. FOR HIGH BLOOD PRESSURE 90 Tablet 1    metoprolol succinate (TOPROL-XL) 50 mg XL tablet Take 1 Tablet by mouth daily. 90 Tablet 1    sertraline (ZOLOFT) 50 mg tablet TAKE 1 AND 1/2 TABLETS BY MOUTH DAILY 135 Tablet 0    pantoprazole (PROTONIX) 40 mg tablet Take 1 Tablet by mouth daily. 90 Tablet 1    SUMAtriptan (IMITREX) 50 mg tablet Take 50 mg at headache onset.   May repeat 50 mg dose in 2 hours if headache persists. Max 2 doses/24 hours 30 Tablet 1    spironolactone-hydrochlorothiazide (ALDACTAZIDE) 25-25 mg per tablet Take 1 Tablet by mouth daily. 30 Tablet 2    diclofenac EC (VOLTAREN) 75 mg EC tablet Take 1 Tablet by mouth two (2) times daily as needed for Pain. 60 Tablet 2    butalbital-acetaminophen-caff (FIORICET) -40 mg per capsule Take 1 Capsule by mouth every six (6) hours as needed for Headache. 60 Capsule 2    loratadine (Claritin) 10 mg tablet Take 1 Tablet by mouth daily as needed for Allergies. 90 Tablet 1    fluticasone propionate (FLONASE) 50 mcg/actuation nasal spray SHAKE LIQUID AND USE 2 SPRAYS IN EACH NOSTRIL DAILY 1 Each 1    amLODIPine (NORVASC) 10 mg tablet TAKE 1 TABLET BY MOUTH DAILY FOR BLOOD PRESSURE 90 Tablet 1    hydrALAZINE (APRESOLINE) 100 mg tablet TAKE 1 TABLET BY MOUTH THREE TIMES DAILY 270 Tab 0    hydrocortisone (ANUSOL-HC) 2.5 % rectal cream Insert  into rectum four (4) times daily. 30 g 0    docusate sodium (COLACE) 100 mg capsule Take 1 Cap by mouth two (2) times a day. 60 Cap 2    cetirizine (ZYRTEC) 10 mg tablet Take 1 Tab by mouth daily.  (Patient taking differently: Take 10 mg by mouth daily as needed.) 30 Tab 2    atorvastatin (LIPITOR) 40 mg tablet TAKE 1 TABLET BY MOUTH DAILY 90 Tab 0       Allergies  No Known Allergies    Family History  Family History   Problem Relation Age of Onset    Diabetes Mother     Heart Disease Father     Diabetes Father     Heart Attack Neg Hx     Stroke Neg Hx     Sudden Death Neg Hx        Social History  Social History     Socioeconomic History    Marital status: SINGLE     Spouse name: Not on file    Number of children: Not on file    Years of education: Not on file    Highest education level: Not on file   Occupational History    Not on file   Tobacco Use    Smoking status: Never    Smokeless tobacco: Never   Vaping Use    Vaping Use: Not on file   Substance and Sexual Activity    Alcohol use: No    Drug use: No    Sexual activity: Yes     Partners: Female   Other Topics Concern    Not on file   Social History Narrative    Not on file     Social Determinants of Health     Financial Resource Strain: Not on file   Food Insecurity: Not on file   Transportation Needs: Not on file   Physical Activity: Not on file   Stress: Not on file   Social Connections: Not on file   Intimate Partner Violence: Not on file   Housing Stability: Not on file       Review of Systems  Review of Systems - Review of all systems is negative except as noted above in the HPI. Vital Signs  Visit Vitals  BP (!) 161/76 (BP 1 Location: Right upper arm, BP Patient Position: Sitting)   Pulse 69   Temp 97.5 °F (36.4 °C) (Temporal)   Resp 17   Ht 5' 5\" (1.651 m)   Wt 173 lb (78.5 kg)   SpO2 100%   BMI 28.79 kg/m²         Physical Exam  Physical Examination: General appearance - oriented to person, place, and time and acyanotic, in no respiratory distress  Mental status - affect appropriate to mood  Neck - supple, no significant adenopathy  Lymphatics - no palpable lymphadenopathy  Chest - no tachypnea, retractions or cyanosis  Heart - S1 and S2 normal  Abdomen - no rebound tenderness noted  bowel sounds normal  Back exam - limited range of motion  Neurological - abnormal neurological exam unchanged from prior examinations  Musculoskeletal - osteoarthritic changes noted in both hands, discomfort to palpation left shoulder margins with limitation in overhead motions.   Extremities - intact peripheral pulses      Results  Results for orders placed or performed in visit on 02/18/21   NUCLEAR CARDIAC STRESS TEST   Result Value Ref Range    Stress Target  bpm    Stress ST Elevation 0 mm    Stress ST Depression 0 mm    Baseline HR 61 bpm    Baseline Systolic  mmHg    Stress Percent HR Achieved 59 %    Stress Peak HR 88 bpm    Baseline Diastolic BP 88 mmHg    Stress Systolic  mmHg    Stress Diastolic BP 84 mmHg    Stress Rate Pressure Product 15,136 bpm*mmHg    Stress Stage 1 HR 74 bpm    Stress Stage 1 /80 mmHg    Stress Stage 2 HR 82 bpm    Stress Stage 2 /84 mmHg    Recovery Stage 1 HR 73 bpm    Recovery Stage 1 /80 mmHg    Nuc Stress Diastolic Volume Index 35.30 mL/m2    Nuc Stress Systolic Volume Index 07.14 mL/m2       ASSESSMENT and PLAN    ICD-10-CM ICD-9-CM    1. Urinary incontinence, unspecified type  R32 788.30 AMB POC URINALYSIS DIP STICK AUTO W/O MICRO      REFERRAL TO UROLOGY      2. Flatulence  R14.3 787.3 simethicone (Mi-Acid Gas Relief,simethicon,) 80 mg chewable tablet      3. Essential hypertension  I10 401.9 doxazosin (CARDURA) 1 mg tablet      4. Change in bowel habit  R19.4 787.99 REFERRAL TO GASTROENTEROLOGY      5. Blood in stool  K92.1 578.1 REFERRAL TO GASTROENTEROLOGY      6. Chronic left shoulder pain  M25.512 719.41 REFERRAL TO ORTHOPEDICS    G89.29 338.29       7. Rotator cuff arthropathy of left shoulder  M12.812 716.81 REFERRAL TO ORTHOPEDICS      8. Mood disorder (HCC)  F39 296.90       9. Gastroesophageal reflux disease, unspecified whether esophagitis present  K21.9 530.81       10. Nonintractable headache, unspecified chronicity pattern, unspecified headache type  R51.9 784.0       11. Dyslipidemia  E78.5 272.4         lab results and schedule of future lab studies reviewed with patient  reviewed diet, exercise and weight control  cardiovascular risk and specific lipid/LDL goals reviewed  reviewed medications and side effects in detail  radiology results and schedule of future radiology studies reviewed with patient      I have discussed the diagnosis with the patient and the intended plan of care as seen in the above orders. The patient has received an after-visit summary and questions were answered concerning future plans. I have discussed medication, side effects, and warnings with the patient in detail. The patient verbalized understanding and is in agreement with the plan of care.  The patient will contact the office with any additional concerns. I spent at least 44 minutes on this visit with this established patient. Gurinder Nicole MD    PLEASE NOTE:   This document has been produced using voice recognition software.  Unrecognized errors in transcription may be present

## 2022-11-23 DIAGNOSIS — D72.819 LEUKOPENIA, UNSPECIFIED TYPE: Primary | ICD-10-CM

## 2022-12-30 DIAGNOSIS — R73.9 HYPERGLYCEMIA: ICD-10-CM

## 2022-12-30 DIAGNOSIS — I10 ESSENTIAL HYPERTENSION: ICD-10-CM

## 2023-01-03 ENCOUNTER — OFFICE VISIT (OUTPATIENT)
Dept: FAMILY MEDICINE CLINIC | Age: 73
End: 2023-01-03
Payer: MEDICARE

## 2023-01-03 VITALS
TEMPERATURE: 97.3 F | DIASTOLIC BLOOD PRESSURE: 78 MMHG | SYSTOLIC BLOOD PRESSURE: 136 MMHG | OXYGEN SATURATION: 99 % | WEIGHT: 177 LBS | HEART RATE: 63 BPM | BODY MASS INDEX: 29.49 KG/M2 | HEIGHT: 65 IN | RESPIRATION RATE: 17 BRPM

## 2023-01-03 DIAGNOSIS — R19.4 CHANGE IN BOWEL HABIT: ICD-10-CM

## 2023-01-03 DIAGNOSIS — F39 MOOD DISORDER (HCC): ICD-10-CM

## 2023-01-03 DIAGNOSIS — M12.812 ROTATOR CUFF ARTHROPATHY OF LEFT SHOULDER: ICD-10-CM

## 2023-01-03 DIAGNOSIS — M25.512 CHRONIC LEFT SHOULDER PAIN: ICD-10-CM

## 2023-01-03 DIAGNOSIS — I10 ESSENTIAL HYPERTENSION: Primary | ICD-10-CM

## 2023-01-03 DIAGNOSIS — E78.5 DYSLIPIDEMIA: ICD-10-CM

## 2023-01-03 DIAGNOSIS — R32 URINARY INCONTINENCE, UNSPECIFIED TYPE: ICD-10-CM

## 2023-01-03 DIAGNOSIS — G89.29 CHRONIC LEFT SHOULDER PAIN: ICD-10-CM

## 2023-01-03 DIAGNOSIS — R51.9 NONINTRACTABLE HEADACHE, UNSPECIFIED CHRONICITY PATTERN, UNSPECIFIED HEADACHE TYPE: ICD-10-CM

## 2023-01-03 DIAGNOSIS — K21.9 GASTROESOPHAGEAL REFLUX DISEASE, UNSPECIFIED WHETHER ESOPHAGITIS PRESENT: ICD-10-CM

## 2023-01-03 PROCEDURE — 3017F COLORECTAL CA SCREEN DOC REV: CPT | Performed by: FAMILY MEDICINE

## 2023-01-03 PROCEDURE — 3075F SYST BP GE 130 - 139MM HG: CPT | Performed by: FAMILY MEDICINE

## 2023-01-03 PROCEDURE — G8536 NO DOC ELDER MAL SCRN: HCPCS | Performed by: FAMILY MEDICINE

## 2023-01-03 PROCEDURE — 99214 OFFICE O/P EST MOD 30 MIN: CPT | Performed by: FAMILY MEDICINE

## 2023-01-03 PROCEDURE — 1123F ACP DISCUSS/DSCN MKR DOCD: CPT | Performed by: FAMILY MEDICINE

## 2023-01-03 PROCEDURE — G8427 DOCREV CUR MEDS BY ELIG CLIN: HCPCS | Performed by: FAMILY MEDICINE

## 2023-01-03 PROCEDURE — G8417 CALC BMI ABV UP PARAM F/U: HCPCS | Performed by: FAMILY MEDICINE

## 2023-01-03 PROCEDURE — G9717 DOC PT DX DEP/BP F/U NT REQ: HCPCS | Performed by: FAMILY MEDICINE

## 2023-01-03 PROCEDURE — 3078F DIAST BP <80 MM HG: CPT | Performed by: FAMILY MEDICINE

## 2023-01-03 PROCEDURE — 1101F PT FALLS ASSESS-DOCD LE1/YR: CPT | Performed by: FAMILY MEDICINE

## 2023-01-03 RX ORDER — METHYLPREDNISOLONE 4 MG/1
TABLET ORAL
Qty: 1 DOSE PACK | Refills: 0 | Status: SHIPPED | OUTPATIENT
Start: 2023-01-03

## 2023-01-03 RX ORDER — DEXTROMETHORPHAN HYDROBROMIDE, GUAIFENESIN 5; 100 MG/5ML; MG/5ML
650 LIQUID ORAL
Qty: 90 TABLET | Refills: 1 | Status: SHIPPED | OUTPATIENT
Start: 2023-01-03

## 2023-01-03 NOTE — PROGRESS NOTES
[facility]  Larissa Gonzalez comes in for follow-up care. Hypertension: Blood pressure is stable. Patient denies headache, changes in vision or focal weakness. Patient is on Cozaar, Toprol-XL, Aldactazide, amlodipine and hydralazine. Stable on these medications. We will continue current treatment plan. Change in bowel habit: Patient has a history of change in bowel habit. Has also noticed some blood in stool. Due to see the gastroenterologist tomorrow. States that symptoms have improved. We will follow-up with recommendations for the gastroenterologist.  He needs to have a colonoscopy done. Shoulder pain: Patient has chronic left shoulder pain. Pain is noted with range of motion. We have referred him to see the orthopedist but he is yet to set up an appointment. Another referral will be placed. Continues to have pain that comes on and off with activity. No swelling or trauma to the shoulder. I will send in Tylenol arthritis and Medrol Dosepak. He has a Lidoderm patch that he has applied. Migraine headaches: Patient has a history of migraine headaches. He is on Fioricet and Imitrex for acute headache. Continue current treatment plan. Mood disorder: Patient has a history of mood disorder. He is on sertraline. Stable on medication. Continue current treatment plan. GERD: Patient has gastroesophageal reflux disease. He continues to have heartburn but labs ordered. He is scheduled to see the gastroenterologist.  He takes Protonix. Continue current treatment plan. Dyslipidemia: Patient has dyslipidemia. Patient is on Lipitor. Stable on medication. Continue current treatment plan. LUTS: Patient has no urinary tract symptoms. He is on Cardura. Continue current treatment plan.   He will follow-up with a urologist.      Past Medical History  Past Medical History:   Diagnosis Date    BPH (benign prostatic hyperplasia)     Depression     Hyperlipidemia     Hypertension     Irritability and anger Syncope 2017    no injuries       Surgical History  Past Surgical History:   Procedure Laterality Date    COLONOSCOPY N/A 7/19/2017    COLONOSCOPY / polypectomy performed by Rubi Boyle MD at [address]    plate placed in head due to injury    CT COLSC FLX W/REMOVAL LESION BY HOT BX FORCEPS N/A 07/19/2017    Dr. Andre Rodriguez        Medications  Current Outpatient Medications   Medication Sig Dispense Refill    simethicone (Mi-Acid Gas Relief,simethicon,) 80 mg chewable tablet CHEW AND SWALLOW ONE TABLET EVERY 6 HOURS AS NEEDED 120 Tablet 1    doxazosin (CARDURA) 1 mg tablet Take 1 Tablet by mouth nightly. 30 Tablet 3    lidocaine (LIDODERM) 5 % Apply 1 patch as directed for 12 hours every 24 hours (12 hours on, 12 hours off)      losartan (COZAAR) 100 mg tablet Take 1 Tablet by mouth daily. FOR HIGH BLOOD PRESSURE 90 Tablet 1    metoprolol succinate (TOPROL-XL) 50 mg XL tablet Take 1 Tablet by mouth daily. 90 Tablet 1    sertraline (ZOLOFT) 50 mg tablet TAKE 1 AND 1/2 TABLETS BY MOUTH DAILY 135 Tablet 0    pantoprazole (PROTONIX) 40 mg tablet Take 1 Tablet by mouth daily. 90 Tablet 1    SUMAtriptan (IMITREX) 50 mg tablet Take 50 mg at headache onset. May repeat 50 mg dose in 2 hours if headache persists. Max 2 doses/24 hours 30 Tablet 1    spironolactone-hydrochlorothiazide (ALDACTAZIDE) 25-25 mg per tablet Take 1 Tablet by mouth daily. 30 Tablet 2    diclofenac EC (VOLTAREN) 75 mg EC tablet Take 1 Tablet by mouth two (2) times daily as needed for Pain. 60 Tablet 2    butalbital-acetaminophen-caff (FIORICET) -40 mg per capsule Take 1 Capsule by mouth every six (6) hours as needed for Headache. 60 Capsule 2    loratadine (Claritin) 10 mg tablet Take 1 Tablet by mouth daily as needed for Allergies.  90 Tablet 1    fluticasone propionate (FLONASE) 50 mcg/actuation nasal spray SHAKE LIQUID AND USE 2 SPRAYS IN EACH NOSTRIL DAILY 1 Each 1    amLODIPine (NORVASC) 10 mg tablet TAKE 1 TABLET BY MOUTH DAILY FOR BLOOD PRESSURE 90 Tablet 1    hydrALAZINE (APRESOLINE) 100 mg tablet TAKE 1 TABLET BY MOUTH THREE TIMES DAILY 270 Tab 0    hydrocortisone (ANUSOL-HC) 2.5 % rectal cream Insert  into rectum four (4) times daily. 30 g 0    docusate sodium (COLACE) 100 mg capsule Take 1 Cap by mouth two (2) times a day. 60 Cap 2    cetirizine (ZYRTEC) 10 mg tablet Take 1 Tab by mouth daily. (Patient taking differently: Take 10 mg by mouth daily as needed.) 30 Tab 2    atorvastatin (LIPITOR) 40 mg tablet TAKE 1 TABLET BY MOUTH DAILY 90 Tab 0       Allergies  No Known Allergies    Family History  Family History   Problem Relation Age of Onset    Diabetes Mother     Heart Disease Father     Diabetes Father     Heart Attack Neg Hx     Stroke Neg Hx     Sudden Death Neg Hx        Social History  Social History     Socioeconomic History    Marital status: SINGLE     Spouse name: Not on file    Number of children: Not on file    Years of education: Not on file    Highest education level: Not on file   Occupational History    Not on file   Tobacco Use    Smoking status: Never    Smokeless tobacco: Never   Vaping Use    Vaping Use: Not on file   Substance and Sexual Activity    Alcohol use: No    Drug use: No    Sexual activity: Yes     Partners: Female   Other Topics Concern    Not on file   Social History Narrative    Not on file     Social Determinants of Health     Financial Resource Strain: Not on file   Food Insecurity: Not on file   Transportation Needs: Not on file   Physical Activity: Not on file   Stress: Not on file   Social Connections: Not on file   Intimate Partner Violence: Not on file   Housing Stability: Not on file       Review of Systems  Review of Systems - Review of all systems is negative except as noted above in the HPI.     Vital Signs  Visit Vitals  /78   Pulse 63   Temp 97.3 °F (36.3 °C) (Temporal)   Resp 17   Ht 5' 5\" (1.651 m)   Wt 177 lb (80.3 kg)   SpO2 99%   BMI 29.45 kg/m² Physical Exam  Physical Examination: General appearance - acyanotic, in no respiratory distress  Mental status - affect appropriate to mood  Neck - supple, no significant adenopathy  Lymphatics - no palpable lymphadenopathy  Chest - no tachypnea, retractions or cyanosis  Heart - systolic murmur 2/6 at lower left sternal border  Abdomen - no rebound tenderness noted  Back exam - limited range of motion  Neurological - abnormal neurological exam unchanged from prior examinations  Musculoskeletal - osteoarthritic changes noted in both hands, discomfort to palpation left shoulder margins. Limited range of motion of the left upper extremity at the shoulder joint. Extremities - no pedal edema noted        Results  Results for orders placed or performed in visit on 11/22/22   AMB POC URINALYSIS DIP STICK AUTO W/O MICRO   Result Value Ref Range    Color (UA POC) Anusha     Clarity (UA POC) Slightly Cloudy     Glucose (UA POC) Negative Negative    Bilirubin (UA POC) Negative Negative    Ketones (UA POC) Negative Negative    Specific gravity (UA POC) 1.025 1.001 - 1.035    Blood (UA POC) Negative Negative    pH (UA POC) 6.5 4.6 - 8.0    Protein (UA POC) Negative Negative    Urobilinogen (UA POC) 0.2 mg/dL 0.2 - 1    Nitrites (UA POC) Negative Negative    Leukocyte esterase (UA POC) Negative Negative       ASSESSMENT and PLAN    ICD-10-CM ICD-9-CM    1. Essential hypertension  I10 401.9       2. Mood disorder (HCC)  F39 296.90       3. Change in bowel habit  R19.4 787.99       4. Chronic left shoulder pain  M25.512 719.41 REFERRAL TO ORTHOPEDICS    G89.29 338.29 methylPREDNISolone (MEDROL DOSEPACK) 4 mg tablet      acetaminophen (Tylenol Arthritis Pain) 650 mg TbER      5. Rotator cuff arthropathy of left shoulder  M12.812 716.81 REFERRAL TO ORTHOPEDICS      methylPREDNISolone (MEDROL DOSEPACK) 4 mg tablet      acetaminophen (Tylenol Arthritis Pain) 650 mg TbER      6.  Urinary incontinence, unspecified type  R32 788.30       7. Gastroesophageal reflux disease, unspecified whether esophagitis present  K21.9 530.81       8. Nonintractable headache, unspecified chronicity pattern, unspecified headache type  R51.9 784.0       9. Dyslipidemia  E78.5 272.4         lab results and schedule of future lab studies reviewed with patient  reviewed diet, exercise and weight control  cardiovascular risk and specific lipid/LDL goals reviewed  reviewed medications and side effects in detail  radiology results and schedule of future radiology studies reviewed with patient      I have discussed the diagnosis with the patient and the intended plan of care as seen in the above orders. The patient has received an after-visit summary and questions were answered concerning future plans. I have discussed medication, side effects, and warnings with the patient in detail. The patient verbalized understanding and is in agreement with the plan of care. The patient will contact the office with any additional concerns. Milli Crow MD    PLEASE NOTE:   This document has been produced using voice recognition software.  Unrecognized errors in transcription may be present

## 2023-01-04 ENCOUNTER — OFFICE VISIT (OUTPATIENT)
Dept: GASTROENTEROLOGY | Age: 73
End: 2023-01-04
Payer: MEDICARE

## 2023-01-04 VITALS
HEART RATE: 64 BPM | DIASTOLIC BLOOD PRESSURE: 74 MMHG | SYSTOLIC BLOOD PRESSURE: 133 MMHG | WEIGHT: 177 LBS | BODY MASS INDEX: 29.45 KG/M2

## 2023-01-04 DIAGNOSIS — Z86.69 HX OF MIGRAINE HEADACHES: ICD-10-CM

## 2023-01-04 DIAGNOSIS — K21.9 GASTROESOPHAGEAL REFLUX DISEASE WITHOUT ESOPHAGITIS: ICD-10-CM

## 2023-01-04 DIAGNOSIS — Z86.010 HISTORY OF COLON POLYPS: ICD-10-CM

## 2023-01-04 DIAGNOSIS — F32.A DEPRESSION, UNSPECIFIED DEPRESSION TYPE: ICD-10-CM

## 2023-01-04 DIAGNOSIS — R19.4 CHANGE IN BOWEL HABITS: Primary | ICD-10-CM

## 2023-01-04 DIAGNOSIS — K62.5 RECTAL BLEEDING: ICD-10-CM

## 2023-01-04 DIAGNOSIS — I10 PRIMARY HYPERTENSION: ICD-10-CM

## 2023-01-04 PROCEDURE — 3017F COLORECTAL CA SCREEN DOC REV: CPT | Performed by: INTERNAL MEDICINE

## 2023-01-04 PROCEDURE — G8536 NO DOC ELDER MAL SCRN: HCPCS | Performed by: INTERNAL MEDICINE

## 2023-01-04 PROCEDURE — G8417 CALC BMI ABV UP PARAM F/U: HCPCS | Performed by: INTERNAL MEDICINE

## 2023-01-04 PROCEDURE — 99203 OFFICE O/P NEW LOW 30 MIN: CPT | Performed by: INTERNAL MEDICINE

## 2023-01-04 PROCEDURE — 1123F ACP DISCUSS/DSCN MKR DOCD: CPT | Performed by: INTERNAL MEDICINE

## 2023-01-04 PROCEDURE — G9717 DOC PT DX DEP/BP F/U NT REQ: HCPCS | Performed by: INTERNAL MEDICINE

## 2023-01-04 PROCEDURE — G8427 DOCREV CUR MEDS BY ELIG CLIN: HCPCS | Performed by: INTERNAL MEDICINE

## 2023-01-04 PROCEDURE — 3075F SYST BP GE 130 - 139MM HG: CPT | Performed by: INTERNAL MEDICINE

## 2023-01-04 PROCEDURE — 3078F DIAST BP <80 MM HG: CPT | Performed by: INTERNAL MEDICINE

## 2023-01-04 PROCEDURE — 1101F PT FALLS ASSESS-DOCD LE1/YR: CPT | Performed by: INTERNAL MEDICINE

## 2023-01-04 RX ORDER — POLYETHYLENE GLYCOL 3350, SODIUM SULFATE ANHYDROUS, SODIUM BICARBONATE, SODIUM CHLORIDE, POTASSIUM CHLORIDE 236; 22.74; 6.74; 5.86; 2.97 G/4L; G/4L; G/4L; G/4L; G/4L
4 POWDER, FOR SOLUTION ORAL
Qty: 4000 ML | Refills: 0 | Status: SHIPPED | OUTPATIENT
Start: 2023-01-04 | End: 2023-01-04

## 2023-01-04 NOTE — LETTER
1/4/2023    Patient: Sang Garcia   YOB: 1950   Date of Visit: 1/4/2023     Chrissy Bassett MD  [facility]    Dear Chrissy Bassett MD,      Thank you for referring Mr. Lionel Thompson to [address] [place] [address] for evaluation. My notes for this consultation are attached. If you have questions, please do not hesitate to call me. I look forward to following your patient along with you.       Sincerely,    Christy Doe MD

## 2023-01-04 NOTE — PROGRESS NOTES
Benito Lee presents today for   Chief Complaint   Patient presents with    New Patient     Patient was referred for blood in stool and change in bowel habits. Saw PCP yesterday and told him that the blood had stopped. Last colonoscopy was 2 years ago done by a doctor in [place]       Is someone accompanying this pt? no    Is the patient using any DME equipment during [address]? no    Depression Screening:  3 most recent PHQ Screens 1/4/2023   PHQ Not Done -   Little interest or pleasure in doing things Not at all   Feeling down, depressed, irritable, or hopeless Not at all   Total Score PHQ 2 0   Trouble falling or staying asleep, or sleeping too much -   Feeling tired or having little energy -   Poor appetite, weight loss, or overeating -   Feeling bad about yourself - or that you are a failure or have let yourself or your family down -   Trouble concentrating on things such as school, work, reading, or watching TV -   Moving or speaking so slowly that other people could have noticed; or the opposite being so fidgety that others notice -   Thoughts of being better off dead, or hurting yourself in some way -   PHQ 9 Score -   How difficult have these problems made it for you to do your work, take care of your home and get along with others -       Learning Assessment:  Learning Assessment 3/15/2017   PRIMARY LEARNER Patient   BARRIERS PRIMARY LEARNER NONE   PRIMARY LANGUAGE ENGLISH   LEARNER PREFERENCE PRIMARY LISTENING     DEMONSTRATION   ANSWERED BY patient   RELATIONSHIP SELF       Fall Risk  Fall Risk Assessment, last 12 mths 1/4/2023   Able to walk? Yes   Fall in past 12 months? 1   Do you feel unsteady? 0   Are you worried about falling 0   Is TUG test greater than 12 seconds? -   Is the gait abnormal? -   Number of falls in past 12 months 1   Fall with injury? 1       Coordination of Care:  1. Have you been to the ER, urgent care clinic since your last visit? Hospitalized since your last visit? no    2.  Have you seen or consulted any other health care providers outside of the [address] [place] [address] since your last visit? Include any pap smears or colon screening. Yes, pcp amor
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
## 2023-01-27 DIAGNOSIS — I10 ESSENTIAL HYPERTENSION: ICD-10-CM

## 2023-01-27 DIAGNOSIS — E78.5 DYSLIPIDEMIA: ICD-10-CM

## 2023-02-13 DIAGNOSIS — Z12.11 SCREEN FOR COLON CANCER: Primary | ICD-10-CM

## 2023-02-13 DIAGNOSIS — R32 URINARY INCONTINENCE, UNSPECIFIED TYPE: Primary | ICD-10-CM

## 2023-03-23 ENCOUNTER — PREP FOR PROCEDURE (OUTPATIENT)
Age: 73
End: 2023-03-23

## 2023-03-23 DIAGNOSIS — Z86.010 HISTORY OF COLON POLYPS: Primary | ICD-10-CM

## 2023-03-23 DIAGNOSIS — K62.5 RECTAL BLEEDING: ICD-10-CM

## 2023-03-23 DIAGNOSIS — R19.4 CHANGE IN BOWEL HABITS: ICD-10-CM

## 2023-03-23 RX ORDER — SODIUM CHLORIDE, SODIUM LACTATE, POTASSIUM CHLORIDE, CALCIUM CHLORIDE 600; 310; 30; 20 MG/100ML; MG/100ML; MG/100ML; MG/100ML
INJECTION, SOLUTION INTRAVENOUS CONTINUOUS
Status: CANCELLED | OUTPATIENT
Start: 2023-03-23

## 2023-03-28 DIAGNOSIS — R19.4 CHANGE IN BOWEL HABITS: Primary | ICD-10-CM

## 2023-03-28 RX ORDER — POLYETHYLENE GLYCOL 3350, SODIUM SULFATE ANHYDROUS, SODIUM BICARBONATE, SODIUM CHLORIDE, POTASSIUM CHLORIDE 236; 22.74; 6.74; 5.86; 2.97 G/4L; G/4L; G/4L; G/4L; G/4L
4 POWDER, FOR SOLUTION ORAL ONCE
Qty: 4000 ML | Refills: 0 | Status: SHIPPED | OUTPATIENT
Start: 2023-03-28 | End: 2023-03-28

## 2023-04-04 NOTE — TELEPHONE ENCOUNTER
Please let patient know I have sent in the Anusol cream for hemorrhoids. I have also sent in Imitrex to use as needed for the headache.   Given persistence of the headache I have also referred him to see the neurologist.  Yusuf Childers MD Labs/EKG

## 2023-04-05 RX ORDER — SODIUM CHLORIDE 0.9 % (FLUSH) 0.9 %
5-40 SYRINGE (ML) INJECTION PRN
Status: CANCELLED | OUTPATIENT
Start: 2023-04-05

## 2023-04-13 RX ORDER — SODIUM CHLORIDE, SODIUM LACTATE, POTASSIUM CHLORIDE, CALCIUM CHLORIDE 600; 310; 30; 20 MG/100ML; MG/100ML; MG/100ML; MG/100ML
INJECTION, SOLUTION INTRAVENOUS CONTINUOUS
Status: CANCELLED | OUTPATIENT
Start: 2023-04-13

## 2023-04-19 ENCOUNTER — ANESTHESIA (OUTPATIENT)
Age: 73
End: 2023-04-19
Payer: MEDICARE

## 2023-04-19 ENCOUNTER — ANESTHESIA EVENT (OUTPATIENT)
Age: 73
End: 2023-04-19
Payer: MEDICARE

## 2023-04-19 ENCOUNTER — HOSPITAL ENCOUNTER (OUTPATIENT)
Age: 73
Setting detail: OUTPATIENT SURGERY
Discharge: HOME HEALTH | End: 2023-04-19
Attending: INTERNAL MEDICINE | Admitting: INTERNAL MEDICINE
Payer: MEDICARE

## 2023-04-19 VITALS
SYSTOLIC BLOOD PRESSURE: 151 MMHG | HEART RATE: 9 BPM | WEIGHT: 186 LBS | TEMPERATURE: 98.2 F | BODY MASS INDEX: 30.95 KG/M2 | OXYGEN SATURATION: 99 % | DIASTOLIC BLOOD PRESSURE: 86 MMHG | RESPIRATION RATE: 18 BRPM

## 2023-04-19 DIAGNOSIS — Z86.010 HISTORY OF COLON POLYPS: ICD-10-CM

## 2023-04-19 DIAGNOSIS — K62.5 RECTAL BLEEDING: ICD-10-CM

## 2023-04-19 PROCEDURE — 7100000011 HC PHASE II RECOVERY - ADDTL 15 MIN: Performed by: INTERNAL MEDICINE

## 2023-04-19 PROCEDURE — G0105 COLORECTAL SCRN; HI RISK IND: HCPCS | Performed by: INTERNAL MEDICINE

## 2023-04-19 PROCEDURE — 3700000001 HC ADD 15 MINUTES (ANESTHESIA): Performed by: INTERNAL MEDICINE

## 2023-04-19 PROCEDURE — 2580000003 HC RX 258: Performed by: NURSE ANESTHETIST, CERTIFIED REGISTERED

## 2023-04-19 PROCEDURE — 6360000002 HC RX W HCPCS: Performed by: NURSE ANESTHETIST, CERTIFIED REGISTERED

## 2023-04-19 PROCEDURE — 2580000003 HC RX 258: Performed by: INTERNAL MEDICINE

## 2023-04-19 PROCEDURE — 3600007501: Performed by: INTERNAL MEDICINE

## 2023-04-19 PROCEDURE — 7100000010 HC PHASE II RECOVERY - FIRST 15 MIN: Performed by: INTERNAL MEDICINE

## 2023-04-19 PROCEDURE — 2709999900 HC NON-CHARGEABLE SUPPLY: Performed by: INTERNAL MEDICINE

## 2023-04-19 PROCEDURE — 3700000000 HC ANESTHESIA ATTENDED CARE: Performed by: INTERNAL MEDICINE

## 2023-04-19 PROCEDURE — 3600007511: Performed by: INTERNAL MEDICINE

## 2023-04-19 RX ORDER — SODIUM CHLORIDE, SODIUM LACTATE, POTASSIUM CHLORIDE, CALCIUM CHLORIDE 600; 310; 30; 20 MG/100ML; MG/100ML; MG/100ML; MG/100ML
INJECTION, SOLUTION INTRAVENOUS CONTINUOUS
Status: DISCONTINUED | OUTPATIENT
Start: 2023-04-19 | End: 2023-04-19 | Stop reason: HOSPADM

## 2023-04-19 RX ORDER — HYDRALAZINE HYDROCHLORIDE 20 MG/ML
INJECTION INTRAMUSCULAR; INTRAVENOUS PRN
Status: DISCONTINUED | OUTPATIENT
Start: 2023-04-19 | End: 2023-04-19 | Stop reason: SDUPTHER

## 2023-04-19 RX ORDER — SODIUM CHLORIDE 0.9 % (FLUSH) 0.9 %
5-40 SYRINGE (ML) INJECTION EVERY 12 HOURS SCHEDULED
Status: DISCONTINUED | OUTPATIENT
Start: 2023-04-19 | End: 2023-04-19 | Stop reason: HOSPADM

## 2023-04-19 RX ORDER — SODIUM CHLORIDE 0.9 % (FLUSH) 0.9 %
5-40 SYRINGE (ML) INJECTION EVERY 12 HOURS SCHEDULED
Status: CANCELLED | OUTPATIENT
Start: 2023-04-19

## 2023-04-19 RX ORDER — SODIUM CHLORIDE, SODIUM LACTATE, POTASSIUM CHLORIDE, CALCIUM CHLORIDE 600; 310; 30; 20 MG/100ML; MG/100ML; MG/100ML; MG/100ML
INJECTION, SOLUTION INTRAVENOUS CONTINUOUS
Status: CANCELLED | OUTPATIENT
Start: 2023-04-19

## 2023-04-19 RX ADMIN — HYDRALAZINE HYDROCHLORIDE 10 MG: 20 INJECTION INTRAMUSCULAR; INTRAVENOUS at 14:29

## 2023-04-19 RX ADMIN — SODIUM CHLORIDE, POTASSIUM CHLORIDE, SODIUM LACTATE AND CALCIUM CHLORIDE: 600; 310; 30; 20 INJECTION, SOLUTION INTRAVENOUS at 14:20

## 2023-04-19 RX ADMIN — SODIUM CHLORIDE, POTASSIUM CHLORIDE, SODIUM LACTATE AND CALCIUM CHLORIDE: 600; 310; 30; 20 INJECTION, SOLUTION INTRAVENOUS at 13:56

## 2023-04-19 ASSESSMENT — PAIN - FUNCTIONAL ASSESSMENT: PAIN_FUNCTIONAL_ASSESSMENT: NONE - DENIES PAIN

## 2023-04-19 ASSESSMENT — ENCOUNTER SYMPTOMS: SHORTNESS OF BREATH: 1

## 2023-04-19 NOTE — ANESTHESIA PRE PROCEDURE
blood products discussed with patient whom.    Plan discussed with surgical team.                    Mariano Gordon, APRN - CRNA   4/19/2023

## 2023-04-19 NOTE — ANESTHESIA POSTPROCEDURE EVALUATION
Department of Anesthesiology  Postprocedure Note    Patient: Lesley Reina  MRN: [id number]  YOB: 1950  Date of evaluation: 4/19/2023      Procedure Summary     Date: 04/19/23 Room / Location: [facility] ENDO 01 / [facility] ENDOSCOPY    Anesthesia Start: 1420 Anesthesia Stop: 1452    Procedure: COLONOSCOPY (Rectum) Diagnosis:       Frequent bowel movements      (Frequent bowel movements [R19.4])    Surgeons: Ritesh White MD Responsible Provider: BROOK Nuno CRNA    Anesthesia Type: MAC ASA Status: 3          Anesthesia Type: MAC    Phyllis Phase I:      Phyllis Phase II:        Anesthesia Post Evaluation    Patient location during evaluation: bedside  Patient participation: complete - patient participated  Level of consciousness: awake and alert  Pain score: 0  Airway patency: patent  Nausea & Vomiting: no nausea and no vomiting  Complications: no  Cardiovascular status: hemodynamically stable  Respiratory status: acceptable and room air  Hydration status: euvolemic

## 2023-05-18 ENCOUNTER — TELEPHONE (OUTPATIENT)
Facility: CLINIC | Age: 73
End: 2023-05-18

## 2023-05-18 RX ORDER — DOXAZOSIN MESYLATE 1 MG/1
1 TABLET ORAL DAILY
Qty: 30 TABLET | Refills: 2 | Status: SHIPPED | OUTPATIENT
Start: 2023-05-18

## 2023-05-19 ENCOUNTER — TELEPHONE (OUTPATIENT)
Facility: CLINIC | Age: 73
End: 2023-05-19

## 2023-08-10 RX ORDER — DOXAZOSIN MESYLATE 1 MG/1
1 TABLET ORAL DAILY
Qty: 30 TABLET | Refills: 2 | Status: SHIPPED | OUTPATIENT
Start: 2023-08-10

## 2024-01-09 ENCOUNTER — OFFICE VISIT (OUTPATIENT)
Facility: CLINIC | Age: 74
End: 2024-01-09
Payer: MEDICARE

## 2024-01-09 VITALS
OXYGEN SATURATION: 100 % | SYSTOLIC BLOOD PRESSURE: 127 MMHG | RESPIRATION RATE: 20 BRPM | BODY MASS INDEX: 29.16 KG/M2 | DIASTOLIC BLOOD PRESSURE: 68 MMHG | HEART RATE: 73 BPM | TEMPERATURE: 97.5 F | WEIGHT: 175 LBS | HEIGHT: 65 IN

## 2024-01-09 DIAGNOSIS — Z71.89 ACP (ADVANCE CARE PLANNING): ICD-10-CM

## 2024-01-09 DIAGNOSIS — R39.9 LOWER URINARY TRACT SYMPTOMS (LUTS): ICD-10-CM

## 2024-01-09 DIAGNOSIS — I10 ESSENTIAL (PRIMARY) HYPERTENSION: ICD-10-CM

## 2024-01-09 DIAGNOSIS — R73.9 HYPERGLYCEMIA, UNSPECIFIED: ICD-10-CM

## 2024-01-09 DIAGNOSIS — Z00.00 MEDICARE ANNUAL WELLNESS VISIT, SUBSEQUENT: Primary | ICD-10-CM

## 2024-01-09 DIAGNOSIS — J30.0 VASOMOTOR RHINITIS: ICD-10-CM

## 2024-01-09 DIAGNOSIS — F39 UNSPECIFIED MOOD (AFFECTIVE) DISORDER (HCC): ICD-10-CM

## 2024-01-09 DIAGNOSIS — Z23 ENCOUNTER FOR IMMUNIZATION: ICD-10-CM

## 2024-01-09 DIAGNOSIS — B37.2 CANDIDAL INTERTRIGO: ICD-10-CM

## 2024-01-09 DIAGNOSIS — Z12.5 SCREENING FOR PROSTATE CANCER: ICD-10-CM

## 2024-01-09 DIAGNOSIS — Z12.11 SCREEN FOR COLON CANCER: ICD-10-CM

## 2024-01-09 LAB — HBA1C MFR BLD: 5.7 %

## 2024-01-09 PROCEDURE — 99497 ADVNCD CARE PLAN 30 MIN: CPT | Performed by: FAMILY MEDICINE

## 2024-01-09 PROCEDURE — 83036 HEMOGLOBIN GLYCOSYLATED A1C: CPT | Performed by: FAMILY MEDICINE

## 2024-01-09 PROCEDURE — 3078F DIAST BP <80 MM HG: CPT | Performed by: FAMILY MEDICINE

## 2024-01-09 PROCEDURE — 1123F ACP DISCUSS/DSCN MKR DOCD: CPT | Performed by: FAMILY MEDICINE

## 2024-01-09 PROCEDURE — G0439 PPPS, SUBSEQ VISIT: HCPCS | Performed by: FAMILY MEDICINE

## 2024-01-09 PROCEDURE — G0008 ADMIN INFLUENZA VIRUS VAC: HCPCS | Performed by: FAMILY MEDICINE

## 2024-01-09 PROCEDURE — 99214 OFFICE O/P EST MOD 30 MIN: CPT | Performed by: FAMILY MEDICINE

## 2024-01-09 PROCEDURE — 3074F SYST BP LT 130 MM HG: CPT | Performed by: FAMILY MEDICINE

## 2024-01-09 PROCEDURE — 90694 VACC AIIV4 NO PRSRV 0.5ML IM: CPT | Performed by: FAMILY MEDICINE

## 2024-01-09 RX ORDER — AMLODIPINE BESYLATE 10 MG/1
10 TABLET ORAL DAILY
Qty: 90 TABLET | Refills: 1 | Status: SHIPPED | OUTPATIENT
Start: 2024-01-09

## 2024-01-09 RX ORDER — LOSARTAN POTASSIUM 100 MG/1
100 TABLET ORAL DAILY
Qty: 90 TABLET | Refills: 1 | Status: SHIPPED | OUTPATIENT
Start: 2024-01-09

## 2024-01-09 RX ORDER — DOXAZOSIN MESYLATE 1 MG/1
1 TABLET ORAL DAILY
Qty: 90 TABLET | Refills: 1 | Status: SHIPPED | OUTPATIENT
Start: 2024-01-09

## 2024-01-09 RX ORDER — CETIRIZINE HYDROCHLORIDE 10 MG/1
10 TABLET ORAL DAILY
Qty: 90 TABLET | Refills: 1 | Status: SHIPPED | OUTPATIENT
Start: 2024-01-09

## 2024-01-09 RX ORDER — METOPROLOL SUCCINATE 50 MG/1
50 TABLET, EXTENDED RELEASE ORAL DAILY
Qty: 90 TABLET | Refills: 1 | Status: SHIPPED | OUTPATIENT
Start: 2024-01-09

## 2024-01-09 SDOH — ECONOMIC STABILITY: FOOD INSECURITY: WITHIN THE PAST 12 MONTHS, YOU WORRIED THAT YOUR FOOD WOULD RUN OUT BEFORE YOU GOT MONEY TO BUY MORE.: NEVER TRUE

## 2024-01-09 SDOH — ECONOMIC STABILITY: HOUSING INSECURITY
IN THE LAST 12 MONTHS, WAS THERE A TIME WHEN YOU DID NOT HAVE A STEADY PLACE TO SLEEP OR SLEPT IN A SHELTER (INCLUDING NOW)?: NO

## 2024-01-09 SDOH — ECONOMIC STABILITY: INCOME INSECURITY: HOW HARD IS IT FOR YOU TO PAY FOR THE VERY BASICS LIKE FOOD, HOUSING, MEDICAL CARE, AND HEATING?: NOT VERY HARD

## 2024-01-09 SDOH — ECONOMIC STABILITY: FOOD INSECURITY: WITHIN THE PAST 12 MONTHS, THE FOOD YOU BOUGHT JUST DIDN'T LAST AND YOU DIDN'T HAVE MONEY TO GET MORE.: NEVER TRUE

## 2024-01-09 ASSESSMENT — PATIENT HEALTH QUESTIONNAIRE - PHQ9
5. POOR APPETITE OR OVEREATING: 0
10. IF YOU CHECKED OFF ANY PROBLEMS, HOW DIFFICULT HAVE THESE PROBLEMS MADE IT FOR YOU TO DO YOUR WORK, TAKE CARE OF THINGS AT HOME, OR GET ALONG WITH OTHER PEOPLE: 0
8. MOVING OR SPEAKING SO SLOWLY THAT OTHER PEOPLE COULD HAVE NOTICED. OR THE OPPOSITE, BEING SO FIGETY OR RESTLESS THAT YOU HAVE BEEN MOVING AROUND A LOT MORE THAN USUAL: 0
1. LITTLE INTEREST OR PLEASURE IN DOING THINGS: 0
9. THOUGHTS THAT YOU WOULD BE BETTER OFF DEAD, OR OF HURTING YOURSELF: 0
7. TROUBLE CONCENTRATING ON THINGS, SUCH AS READING THE NEWSPAPER OR WATCHING TELEVISION: 0
6. FEELING BAD ABOUT YOURSELF - OR THAT YOU ARE A FAILURE OR HAVE LET YOURSELF OR YOUR FAMILY DOWN: 0
SUM OF ALL RESPONSES TO PHQ QUESTIONS 1-9: 0
SUM OF ALL RESPONSES TO PHQ9 QUESTIONS 1 & 2: 0
SUM OF ALL RESPONSES TO PHQ QUESTIONS 1-9: 0
SUM OF ALL RESPONSES TO PHQ QUESTIONS 1-9: 0
2. FEELING DOWN, DEPRESSED OR HOPELESS: 0
3. TROUBLE FALLING OR STAYING ASLEEP: 0
4. FEELING TIRED OR HAVING LITTLE ENERGY: 0
SUM OF ALL RESPONSES TO PHQ QUESTIONS 1-9: 0

## 2024-01-09 ASSESSMENT — LIFESTYLE VARIABLES
HOW MANY STANDARD DRINKS CONTAINING ALCOHOL DO YOU HAVE ON A TYPICAL DAY: PATIENT DOES NOT DRINK
HOW OFTEN DO YOU HAVE A DRINK CONTAINING ALCOHOL: NEVER

## 2024-01-09 NOTE — ACP (ADVANCE CARE PLANNING)
Advance Care Planning     Advance Care Planning (ACP) Physician/NP/PA (Provider) Conversation      Date of ACP Conversation: 1/9/2024    Conversation Conducted with:   Patient with Decision Making Capacity    Health Care Decision Maker:    Current Designated Health Care Decision Maker:    Primary Decision Maker (Active): Ashley Singh - [facility] - [phone]        Care Preferences:    Hospitalization:  \"If your health worsens and it becomes clear that your chance of recovery is unlikely, what would your preference be regarding hospitalization?\"  If the patient would want hospitalization, answer \"yes\". If the patient would prefer comfort-focused treatment without hospitalization, answer \"no\". YES/NO/WILD CARDS: yes      Ventilation:  \"If you were in your present state of health and suddenly became very ill and were unable to breathe on your own, what would your preference be about the use of a ventilator (breathing machine) if it was available to you?\"    If patient would desire the use of a ventilator (breathing machine), answer \"yes\", if not answer \"no\":yes    \"If your health worsens and it becomes clear that your chance of recovery is unlikely, what would your preference be about the use of a ventilator (breathing machine) if it was available to you?\"   yes    Resuscitation:  \"CPR works best to restart the heart when there is a sudden event, like a heart attack, in someone who is otherwise healthy. Unfortunately, CPR does not typically restart the heart for people who have serious health conditions or who are very sick.\"    \"In the event your heart stopped as a result of an underlying serious health condition, would you want attempts to be made to restart your heart (answer \"yes\" for attempt to resuscitate) or would you prefer a natural death (answer \"no\" for do not attempt to resuscitate)?\"   yes       Conversation Outcomes / Follow-Up Plan:   Recommended completion of Advance Directive      Length of Voluntary

## 2024-01-10 NOTE — PROGRESS NOTES
1. \"Have you been to the ER, urgent care clinic since your last visit?  Hospitalized since your last visit?\"No    2. \"Have you seen or consulted any other health care providers outside of the [facility] since your last visit?\" No    3. For patients aged 45-75: Has the patient had a colonoscopy / FIT/ Cologuard? Yes      If the patient is female:    4. For patients aged 40-74: Has the patient had a mammogram within the past 2 years? Not applicable      5. For patients aged 21-65: Has the patient had a pap smear? Not applicable  
further evaluation and treatment    Cognitive:      Words recalled: 2 Words Recalled     Total Score Interpretation: Abnormal Mini-Cog  Interventions:  Patient advised to follow-up in this office for further evaluation and treatment            Activity, Diet, and Weight:  On average, how many days per week do you engage in moderate to strenuous exercise (like a brisk walk)?: 0 days  On average, how many minutes do you engage in exercise at this level?: 0 min    Do you eat balanced/healthy meals regularly?: Yes    Body mass index is 29.12 kg/m².      Inactivity Interventions:  Patient advised to follow up in the office for further evaluation and treatment             Advanced Directives:  Do you have a Living Will?: (!) No    Intervention:  see ACP note        Objective   Vitals:    01/09/24 1524   BP: 127/68   Pulse: 73   Resp: 20   Temp: 97.5 °F (36.4 °C)   SpO2: 100%   Weight: 79.4 kg (175 lb)   Height: 1.651 m (5' 5\")      Body mass index is 29.12 kg/m².            No Known Allergies  Prior to Visit Medications    Medication Sig Taking? Authorizing Provider   cetirizine (ZYRTEC) 10 MG tablet Take 1 tablet by mouth daily Yes Guicho Worrell MD   amLODIPine (NORVASC) 10 MG tablet Take 1 tablet by mouth daily Yes Guicho Worrell MD   losartan (COZAAR) 100 MG tablet Take 1 tablet by mouth daily Yes Guicho Worrell MD   metoprolol succinate (TOPROL XL) 50 MG extended release tablet Take 1 tablet by mouth daily Yes Guicho Worrell MD   doxazosin (CARDURA) 1 MG tablet Take 1 tablet by mouth daily Yes Guicho Worrell MD   nystatin-triamcinolone (MYCOLOG II) 241036-1.1 UNIT/GM-% cream Apply topically 2 times daily. Yes Guicho Worrell MD   atorvastatin (LIPITOR) 40 MG tablet TAKE 1 TABLET BY MOUTH DAILY Yes Automatic Reconciliation, Ar   butalbital-APAP-caffeine -40 MG CAPS per capsule Take 1 capsule by mouth every 6 hours as needed Yes Automatic Reconciliation, Ar   docusate (COLACE, DULCOLAX) 100 MG 
MD    PLEASE NOTE:   This document has been produced using voice recognition software. Unrecognized errors in transcription may be present

## 2024-01-10 NOTE — PATIENT INSTRUCTIONS
Examples include spinach, carrots, peaches, and berries.     Foods high in fiber can reduce your cholesterol and provide important vitamins and minerals. High-fiber foods include whole-grain cereals and breads, oatmeal, beans, brown rice, citrus fruits, and apples.     Eat lean proteins. Heart-healthy proteins include seafood, lean meats and poultry, eggs, beans, peas, nuts, seeds, and soy products.     Limit drinks and foods with added sugar. These include candy, desserts, and soda pop.   Lifestyle changes    If your doctor recommends it, get more exercise. Walking is a good choice. Bit by bit, increase the amount you walk every day. Try for at least 30 minutes on most days of the week. You also may want to swim, bike, or do other activities.     Do not smoke. If you need help quitting, talk to your doctor about stop-smoking programs and medicines. These can increase your chances of quitting for good. Quitting smoking may be the most important step you can take to protect your heart. It is never too late to quit.     Limit alcohol to 2 drinks a day for men and 1 drink a day for women. Too much alcohol can cause health problems.     Manage other health problems such as diabetes, high blood pressure, and high cholesterol. If you think you may have a problem with alcohol or drug use, talk to your doctor.   Medicines    Take your medicines exactly as prescribed. Call your doctor if you think you are having a problem with your medicine.     If your doctor recommends aspirin, take the amount directed each day. Make sure you take aspirin and not another kind of pain reliever, such as acetaminophen (Tylenol).   When should you call for help?   Call 911 if you have symptoms of a heart attack. These may include:    Chest pain or pressure, or a strange feeling in the chest.     Sweating.     Shortness of breath.     Pain, pressure, or a strange feeling in the back, neck, jaw, or upper belly or in one or both shoulders or

## 2024-01-22 ENCOUNTER — TELEPHONE (OUTPATIENT)
Facility: CLINIC | Age: 74
End: 2024-01-22

## 2024-01-22 NOTE — TELEPHONE ENCOUNTER
Patient is requesting a refill    sertraline (ZOLOFT) 50 MG tablet     Pt has been out of this medication for 2wks and stated he needs it asap. Please follow up when available

## 2024-03-31 ENCOUNTER — HOSPITAL ENCOUNTER (EMERGENCY)
Age: 74
Discharge: HOME | End: 2024-03-31
Attending: FAMILY MEDICINE
Payer: MEDICARE

## 2024-03-31 ENCOUNTER — APPOINTMENT (OUTPATIENT)
Age: 74
End: 2024-03-31
Payer: MEDICARE

## 2024-03-31 VITALS
DIASTOLIC BLOOD PRESSURE: 93 MMHG | HEIGHT: 65 IN | TEMPERATURE: 98.1 F | HEART RATE: 80 BPM | SYSTOLIC BLOOD PRESSURE: 158 MMHG | OXYGEN SATURATION: 98 % | WEIGHT: 180 LBS | BODY MASS INDEX: 29.99 KG/M2 | RESPIRATION RATE: 18 BRPM

## 2024-03-31 DIAGNOSIS — F07.81 POSTCONCUSSION SYNDROME: ICD-10-CM

## 2024-03-31 DIAGNOSIS — S09.90XA CLOSED HEAD INJURY, INITIAL ENCOUNTER: ICD-10-CM
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
## 2024-03-31 DIAGNOSIS — T14.8XXA ABRASION OF SKIN: ICD-10-CM

## 2024-03-31 DIAGNOSIS — M79.642 LEFT HAND PAIN: ICD-10-CM

## 2024-03-31 DIAGNOSIS — S00.03XA HEMATOMA OF SCALP, INITIAL ENCOUNTER: ICD-10-CM

## 2024-03-31 PROCEDURE — 70450 CT HEAD/BRAIN W/O DYE: CPT

## 2024-03-31 PROCEDURE — 99284 EMERGENCY DEPT VISIT MOD MDM: CPT

## 2024-03-31 PROCEDURE — 6370000000 HC RX 637 (ALT 250 FOR IP): Performed by: FAMILY MEDICINE

## 2024-03-31 PROCEDURE — 72125 CT NECK SPINE W/O DYE: CPT

## 2024-03-31 PROCEDURE — 73130 X-RAY EXAM OF HAND: CPT

## 2024-03-31 RX ORDER — IBUPROFEN 400 MG/1
400 TABLET ORAL ONCE
Status: COMPLETED | OUTPATIENT
Start: 2024-03-31 | End: 2024-03-31

## 2024-03-31 RX ORDER — ACETAMINOPHEN 500 MG
1000 TABLET ORAL
Status: COMPLETED | OUTPATIENT
Start: 2024-03-31 | End: 2024-03-31

## 2024-03-31 RX ORDER — GINSENG 100 MG
CAPSULE ORAL
Status: COMPLETED | OUTPATIENT
Start: 2024-03-31 | End: 2024-03-31

## 2024-03-31 RX ADMIN — IBUPROFEN 400 MG: 400 TABLET, FILM COATED ORAL at 21:11

## 2024-03-31 RX ADMIN — BACITRACIN 1 EACH: 500 OINTMENT TOPICAL at 19:41

## 2024-03-31 RX ADMIN — ACETAMINOPHEN 1000 MG: 500 TABLET ORAL at 19:40

## 2024-03-31 ASSESSMENT — PAIN SCALES - GENERAL
PAINLEVEL_OUTOF10: 9
PAINLEVEL_OUTOF10: 6
PAINLEVEL_OUTOF10: 9
PAINLEVEL_OUTOF10: 0

## 2024-03-31 ASSESSMENT — PAIN DESCRIPTION - DESCRIPTORS
DESCRIPTORS: ACHING

## 2024-03-31 ASSESSMENT — PAIN - FUNCTIONAL ASSESSMENT: PAIN_FUNCTIONAL_ASSESSMENT: 0-10

## 2024-03-31 ASSESSMENT — PAIN DESCRIPTION - LOCATION
LOCATION: HEAD
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
## 2024-03-31 NOTE — ED PROVIDER NOTES
and fourth digits.   Skin:     General: Skin is warm and dry.   Neurological:      General: No focal deficit present.      Mental Status: He is alert and oriented to person, place, and time.   Psychiatric:         Mood and Affect: Mood normal.         Behavior: Behavior normal.         DIAGNOSTIC RESULTS     EKG: All EKG's are interpreted by the Emergency Department Physician who either signs or Co-signs this chart in the absence of a cardiologist.        RADIOLOGY:   Non-plain film images such as CT, Ultrasound and MRI are read by the radiologist. Plain radiographic images are visualized and preliminarily interpreted by the emergency physician with the below findings:        Interpretation per the Radiologist below, if available at the time of this note:    XR HAND LEFT (MIN 3 VIEWS)   Final Result   No acute abnormality.      CT HEAD WO CONTRAST   Final Result   1. No evidence of acute infarct or intracranial hemorrhage.   2. Periventricular white matter disease is likely secondary to chronic small   vessel ischemic changes.   3. Right frontal/supraorbital soft tissue swelling.            CT CERVICAL SPINE WO CONTRAST   Final Result      1.  No acute osseous abnormality.   2. Multilevel degenerative spondylosis.                ED BEDSIDE ULTRASOUND:   Performed by ED Physician - none    LABS:  Labs Reviewed - No data to display    All other labs were within normal range or not returned as of this dictation.    EMERGENCY DEPARTMENT COURSE and DIFFERENTIAL DIAGNOSIS/MDM:   Vitals:    Vitals:    03/31/24 1918   BP: (!) 169/97   Pulse: 85   Resp: 18   Temp: 98.1 °F (36.7 °C)   TempSrc: Oral   SpO2: 98%   Weight: 81.6 kg (180 lb)   Height: 1.651 m (5' 5\")           Medical Decision Making  Differential includes intracranial bleed, subdural hematoma, abrasion, fracture versus sprain of his left hand, I will order a head CT, but also get a cervical as there could be some distraction from pain of his headache which she

## 2024-04-01 NOTE — DISCHARGE INSTRUCTIONS
As you spoke to CT shows no bleeding on the head, no cervical neck injury, no fracture noted in your hand although arthritis is appreciated.  You do have some abrasions, keep them clean, Tylenol ibuprofen for any pains, if your hands not improving within a few days of using Tylenol and ibuprofen ice for any swelling follow-up with Dr. Valente have given you his name and number return to the emergency department if you have some confusion, nausea vomiting or if there is any other questions or concerns..  It is possible you have a concussion.  I have given you some information regarding this.  Basically if it hurts do not do it.  Tylenol ibuprofen for any pain

## 2024-04-01 NOTE — ED NOTES
I have reviewed discharge instructions with the patient.  The patient verbalized understanding. Patient encouraged to return to ER with any other concerns or emergent care needed. Patient escorted to waiting room with steady gait and no distress noted.

## 2024-05-22 ENCOUNTER — OFFICE VISIT (OUTPATIENT)
Facility: CLINIC | Age: 74
End: 2024-05-22
Payer: MEDICARE

## 2024-05-22 VITALS
HEIGHT: 65 IN | BODY MASS INDEX: 29.82 KG/M2 | RESPIRATION RATE: 20 BRPM | TEMPERATURE: 98.1 F | WEIGHT: 179 LBS | SYSTOLIC BLOOD PRESSURE: 138 MMHG | DIASTOLIC BLOOD PRESSURE: 82 MMHG | OXYGEN SATURATION: 100 % | HEART RATE: 49 BPM

## 2024-05-22 DIAGNOSIS — M79.671 RIGHT FOOT PAIN: ICD-10-CM

## 2024-05-22 DIAGNOSIS — E78.5 HYPERLIPIDEMIA, UNSPECIFIED HYPERLIPIDEMIA TYPE: ICD-10-CM

## 2024-05-22 DIAGNOSIS — E07.9 THYROID DISORDER: ICD-10-CM

## 2024-05-22 DIAGNOSIS — R00.1 BRADYCARDIA: Primary | ICD-10-CM

## 2024-05-22 DIAGNOSIS — R39.9 LOWER URINARY TRACT SYMPTOMS (LUTS): ICD-10-CM

## 2024-05-22 DIAGNOSIS — F39 UNSPECIFIED MOOD (AFFECTIVE) DISORDER (HCC): ICD-10-CM

## 2024-05-22 DIAGNOSIS — R07.9 CHEST PAIN, UNSPECIFIED TYPE: ICD-10-CM

## 2024-05-22 DIAGNOSIS — I10 ESSENTIAL (PRIMARY) HYPERTENSION: ICD-10-CM

## 2024-05-22 PROCEDURE — 3079F DIAST BP 80-89 MM HG: CPT | Performed by: FAMILY MEDICINE

## 2024-05-22 PROCEDURE — 3075F SYST BP GE 130 - 139MM HG: CPT | Performed by: FAMILY MEDICINE

## 2024-05-22 PROCEDURE — 1123F ACP DISCUSS/DSCN MKR DOCD: CPT | Performed by: FAMILY MEDICINE

## 2024-05-22 PROCEDURE — 93000 ELECTROCARDIOGRAM COMPLETE: CPT | Performed by: FAMILY MEDICINE

## 2024-05-22 PROCEDURE — 99215 OFFICE O/P EST HI 40 MIN: CPT | Performed by: FAMILY MEDICINE

## 2024-05-22 RX ORDER — LIDOCAINE 50 MG/G
PATCH TOPICAL
COMMUNITY
Start: 2022-07-31

## 2024-05-22 RX ORDER — HYDRALAZINE HYDROCHLORIDE 100 MG/1
100 TABLET, FILM COATED ORAL 3 TIMES DAILY
Qty: 270 TABLET | Refills: 1 | Status: SHIPPED | OUTPATIENT
Start: 2024-05-22

## 2024-05-22 RX ORDER — DOXAZOSIN MESYLATE 1 MG/1
1 TABLET ORAL DAILY
Qty: 90 TABLET | Refills: 1 | Status: SHIPPED | OUTPATIENT
Start: 2024-05-22

## 2024-05-22 RX ORDER — SENNOSIDES 8.6 MG
650 CAPSULE ORAL EVERY 8 HOURS PRN
COMMUNITY
Start: 2023-01-03

## 2024-05-22 RX ORDER — LOSARTAN POTASSIUM 100 MG/1
100 TABLET ORAL DAILY
Qty: 90 TABLET | Refills: 1 | Status: SHIPPED | OUTPATIENT
Start: 2024-05-22

## 2024-05-22 RX ORDER — METOPROLOL SUCCINATE 50 MG/1
50 TABLET, EXTENDED RELEASE ORAL DAILY
Qty: 90 TABLET | Refills: 1 | Status: CANCELLED | OUTPATIENT
Start: 2024-05-22

## 2024-05-22 RX ORDER — IBUPROFEN 600 MG/1
600 TABLET ORAL 3 TIMES DAILY PRN
Qty: 90 TABLET | Refills: 0 | Status: SHIPPED | OUTPATIENT
Start: 2024-05-22

## 2024-05-22 RX ORDER — ATORVASTATIN CALCIUM 40 MG/1
40 TABLET, FILM COATED ORAL DAILY
Qty: 90 TABLET | Refills: 1 | Status: SHIPPED | OUTPATIENT
Start: 2024-05-22

## 2024-05-22 RX ORDER — AMLODIPINE BESYLATE 10 MG/1
10 TABLET ORAL DAILY
Qty: 90 TABLET | Refills: 1 | Status: SHIPPED | OUTPATIENT
Start: 2024-05-22
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
## 2024-07-06 RX ORDER — IBUPROFEN 600 MG/1
600 TABLET ORAL 3 TIMES DAILY PRN
Qty: 90 TABLET | Refills: 0 | Status: SHIPPED | OUTPATIENT
Start: 2024-07-06

## 2024-07-17 NOTE — TELEPHONE ENCOUNTER
Pt called in needing help with transport for upcoming appt.     Pt has been going through a lot of MH issues.   Recent hospitalization MH  Providied active listening and support  Denies S/H  Not taking ART  Dizziness per pt. -- neuro tomorrow  Asking to complete lab work. Faxed to [facility] per pt request.   Reviewed upcoming apts and ID follow up scheduled.         Rn and pt reviewed additional transport options and assistance. It is determined he will use [facility] as last report for help.   Discussed transport policy. Discussed need to call 2 hours prior to apt time for cancellation. Pt verbalized all understanding.   [facility] notified. Pt aware transport will be there for  about 30 mins prior to apt.     All appts are related to medical treatment and are necessary for compliance  This transportation assistance will help patient remain in medical care by enabling them to access medical and support services.    RN scheduled transportation per pt request,    1st transport:    location and  time: 215  Residency:   [address]  [facility] [address]  [phone] (home)     Appt Location and apt time:    7.18.24 at 245 -- [facility]  [address] [facility]      2nd transport:   Residency -  time: AFTER NEURO APT  [address]  [facility] [address]  [phone] (home)      Appt Location date and time:    7.18.24-- [place] [facility]   [address] [facility] [address]     Home after labs        Denies any needs at this time. Denies any issues that need Doctor attention. Will call with concerns      Last seen 5/22/2024   Last labs 11/22/2022  Last filled  05/22/2024  Next appointment Visit date not found     Lab Results   Component Value Date     11/22/2022    K 5.2 11/22/2022     11/22/2022    CO2 27 11/22/2022    BUN 12 11/22/2022    CREATININE 0.91 11/22/2022    GLUCOSE 90 11/22/2022    CALCIUM 9.7 11/22/2022    BILITOT 0.4 11/22/2022    ALKPHOS 105 11/22/2022    AST 25 11/22/2022    ALT 24 11/22/2022    LABGLOM 90 11/22/2022    GFRAA >60 10/06/2020    AGRATIO 1.6 11/22/2022    GLOB 4.0 10/06/2020

## 2024-08-05 RX ORDER — IBUPROFEN 600 MG/1
600 TABLET ORAL 3 TIMES DAILY PRN
Qty: 90 TABLET | Refills: 0 | Status: SHIPPED | OUTPATIENT
Start: 2024-08-05

## 2024-10-03 DIAGNOSIS — J30.0 VASOMOTOR RHINITIS: ICD-10-CM

## 2024-10-03 RX ORDER — CETIRIZINE HYDROCHLORIDE 10 MG/1
10 TABLET ORAL DAILY
Qty: 90 TABLET | Refills: 1 | OUTPATIENT
Start: 2024-10-03

## 2025-01-01 DIAGNOSIS — F39 UNSPECIFIED MOOD (AFFECTIVE) DISORDER (HCC): ICD-10-CM

## 2025-01-02 NOTE — TELEPHONE ENCOUNTER
Last seen 5/22/2024   Last labs 11/22/2023  Last filled  5/22/2024  Next appointment Visit date not found     Lab Results   Component Value Date     11/22/2022    K 5.2 11/22/2022     11/22/2022    CO2 27 11/22/2022    BUN 12 11/22/2022    CREATININE 0.91 11/22/2022    GLUCOSE 90 11/22/2022    CALCIUM 9.7 11/22/2022    BILITOT 0.4 11/22/2022    ALKPHOS 105 11/22/2022    AST 25 11/22/2022    ALT 24 11/22/2022    LABGLOM 90 11/22/2022    GFRAA >60 10/06/2020    AGRATIO 1.6 11/22/2022    GLOB 4.0 10/06/2020

## 2025-01-09 DIAGNOSIS — E78.5 HYPERLIPIDEMIA, UNSPECIFIED HYPERLIPIDEMIA TYPE: ICD-10-CM

## 2025-01-10 NOTE — TELEPHONE ENCOUNTER
Last seen 5/22/2024   Last labs 11/22/2022  Last filled  5/22/2024  Next appointment Visit date not found     Lab Results   Component Value Date     11/22/2022    K 5.2 11/22/2022     11/22/2022    CO2 27 11/22/2022    BUN 12 11/22/2022    CREATININE 0.91 11/22/2022    GLUCOSE 90 11/22/2022    CALCIUM 9.7 11/22/2022    BILITOT 0.4 11/22/2022    ALKPHOS 105 11/22/2022    AST 25 11/22/2022    ALT 24 11/22/2022    LABGLOM 90 11/22/2022    GFRAA >60 10/06/2020    AGRATIO 1.6 11/22/2022    GLOB 4.0 10/06/2020

## 2025-01-12 RX ORDER — ATORVASTATIN CALCIUM 40 MG/1
40 TABLET, FILM COATED ORAL DAILY
Qty: 90 TABLET | Refills: 1 | Status: SHIPPED | OUTPATIENT
Start: 2025-01-12

## 2025-01-14 ENCOUNTER — OFFICE VISIT (OUTPATIENT)
Facility: CLINIC | Age: 75
End: 2025-01-14

## 2025-01-14 VITALS
DIASTOLIC BLOOD PRESSURE: 79 MMHG | TEMPERATURE: 97.8 F | SYSTOLIC BLOOD PRESSURE: 137 MMHG | HEIGHT: 65 IN | RESPIRATION RATE: 20 BRPM | WEIGHT: 190 LBS | BODY MASS INDEX: 31.65 KG/M2 | OXYGEN SATURATION: 100 % | HEART RATE: 71 BPM

## 2025-01-14 DIAGNOSIS — F39 UNSPECIFIED MOOD (AFFECTIVE) DISORDER (HCC): ICD-10-CM

## 2025-01-14 DIAGNOSIS — R73.9 HYPERGLYCEMIA, UNSPECIFIED: ICD-10-CM

## 2025-01-14 DIAGNOSIS — R19.4 CHANGE IN BOWEL HABIT: ICD-10-CM

## 2025-01-14 DIAGNOSIS — Z23 FLU VACCINE NEED: ICD-10-CM

## 2025-01-14 DIAGNOSIS — E78.5 HYPERLIPIDEMIA, UNSPECIFIED HYPERLIPIDEMIA TYPE: ICD-10-CM

## 2025-01-14 DIAGNOSIS — Z12.5 SCREENING FOR MALIGNANT NEOPLASM OF PROSTATE: ICD-10-CM

## 2025-01-14 DIAGNOSIS — M10.9 GOUT, UNSPECIFIED CAUSE, UNSPECIFIED CHRONICITY, UNSPECIFIED SITE: ICD-10-CM

## 2025-01-14 DIAGNOSIS — R39.9 LOWER URINARY TRACT SYMPTOMS (LUTS): ICD-10-CM

## 2025-01-14 DIAGNOSIS — Z00.00 MEDICARE ANNUAL WELLNESS VISIT, SUBSEQUENT: Primary | ICD-10-CM

## 2025-01-14 DIAGNOSIS — R10.84 GENERALIZED ABDOMINAL PAIN: ICD-10-CM

## 2025-01-14 DIAGNOSIS — I10 ESSENTIAL (PRIMARY) HYPERTENSION: ICD-10-CM

## 2025-01-14 LAB
A/G RATIO: 1.3 RATIO (ref 1.1–2.6)
ALBUMIN: 4.3 G/DL (ref 3.5–5)
ALP BLD-CCNC: 102 U/L (ref 40–125)
ALT SERPL-CCNC: 33 U/L (ref 5–40)
ANION GAP SERPL CALCULATED.3IONS-SCNC: 10 MMOL/L (ref 3–15)
AST SERPL-CCNC: 25 U/L (ref 10–37)
BASOPHILS # BLD: 0 % (ref 0–2)
BASOPHILS ABSOLUTE: 0 K/UL (ref 0–0.2)
BILIRUB SERPL-MCNC: 0.5 MG/DL (ref 0.2–1.2)
BUN BLDV-MCNC: 11 MG/DL (ref 6–22)
CALCIUM SERPL-MCNC: 9.4 MG/DL (ref 8.4–10.5)
CHLORIDE BLD-SCNC: 100 MMOL/L (ref 98–110)
CO2: 28 MMOL/L (ref 20–32)
CREAT SERPL-MCNC: 0.8 MG/DL (ref 0.8–1.6)
EOSINOPHIL # BLD: 5 % (ref 0–6)
EOSINOPHILS ABSOLUTE: 0.2 K/UL (ref 0–0.5)
ESTIMATED AVERAGE GLUCOSE: 134 MG/DL (ref 91–123)
GFR, ESTIMATED: >60 ML/MIN/1.73 SQ.M.
GLOBULIN: 3.4 G/DL (ref 2–4)
GLUCOSE: 95 MG/DL (ref 70–99)
HBA1C MFR BLD: 6.3 % (ref 4.8–5.6)
HCT VFR BLD CALC: 39.5 % (ref 37.8–52.2)
HEMOGLOBIN: 13.1 G/DL (ref 12.6–17.1)
LIPASE: <20 U/L (ref 7–60)
LYMPHOCYTES # BLD: 37 % (ref 20–45)
LYMPHOCYTES ABSOLUTE: 1.2 K/UL (ref 1–4.8)
MCH RBC QN AUTO: 30 PG (ref 26–34)
MCHC RBC AUTO-ENTMCNC: 33 G/DL (ref 31–36)
MCV RBC AUTO: 89 FL (ref 80–95)
MONOCYTES ABSOLUTE: 0.4 K/UL (ref 0.1–1)
MONOCYTES: 13 % (ref 3–12)
NEUTROPHILS ABSOLUTE: 1.4 K/UL (ref 1.8–7.7)
NEUTROPHILS SEGMENTED: 45 % (ref 40–75)
PDW BLD-RTO: 13.9 % (ref 10–15.5)
PLATELET # BLD: 196 K/UL (ref 140–440)
PMV BLD AUTO: 10 FL (ref 9–13)
POTASSIUM SERPL-SCNC: 3.9 MMOL/L (ref 3.5–5.5)
RBC # BLD: 4.43 M/UL (ref 3.8–5.8)
SODIUM BLD-SCNC: 138 MMOL/L (ref 133–145)
TOTAL PROTEIN: 7.7 G/DL (ref 6.2–8.1)
URIC ACID: 3.8 MG/DL (ref 3.9–9)
WBC # BLD: 3.2 K/UL (ref 4–11)

## 2025-01-14 RX ORDER — INDOMETHACIN 50 MG/1
50 CAPSULE ORAL 3 TIMES DAILY PRN
Qty: 60 CAPSULE | Refills: 0 | Status: SHIPPED | OUTPATIENT
Start: 2025-01-14

## 2025-01-14 RX ORDER — AMLODIPINE BESYLATE 10 MG/1
10 TABLET ORAL DAILY
Qty: 90 TABLET | Refills: 1 | Status: SHIPPED | OUTPATIENT
Start: 2025-01-14

## 2025-01-14 RX ORDER — DOXAZOSIN 1 MG/1
1 TABLET ORAL DAILY
Qty: 90 TABLET | Refills: 1 | Status: SHIPPED | OUTPATIENT
Start: 2025-01-14

## 2025-01-14 RX ORDER — LOSARTAN POTASSIUM 100 MG/1
100 TABLET ORAL DAILY
Qty: 90 TABLET | Refills: 1 | Status: SHIPPED | OUTPATIENT
Start: 2025-01-14

## 2025-01-14 RX ORDER — COLCHICINE 0.6 MG/1
0.6 TABLET ORAL DAILY
Qty: 30 TABLET | Refills: 1 | Status: SHIPPED | OUTPATIENT
Start: 2025-01-14

## 2025-01-14 RX ORDER — ATORVASTATIN CALCIUM 40 MG/1
40 TABLET, FILM COATED ORAL DAILY
Qty: 90 TABLET | Refills: 1 | Status: SHIPPED | OUTPATIENT
Start: 2025-01-14

## 2025-01-14 RX ORDER — LIDOCAINE 50 MG/G
PATCH TOPICAL
Qty: 30 PATCH | Refills: 1 | Status: SHIPPED | OUTPATIENT
Start: 2025-01-14

## 2025-01-14 SDOH — ECONOMIC STABILITY: FOOD INSECURITY: WITHIN THE PAST 12 MONTHS, YOU WORRIED THAT YOUR FOOD WOULD RUN OUT BEFORE YOU GOT MONEY TO BUY MORE.: NEVER TRUE

## 2025-01-14 SDOH — ECONOMIC STABILITY: FOOD INSECURITY: WITHIN THE PAST 12 MONTHS, THE FOOD YOU BOUGHT JUST DIDN'T LAST AND YOU DIDN'T HAVE MONEY TO GET MORE.: NEVER TRUE

## 2025-01-14 ASSESSMENT — PATIENT HEALTH QUESTIONNAIRE - PHQ9
9. THOUGHTS THAT YOU WOULD BE BETTER OFF DEAD, OR OF HURTING YOURSELF: NOT AT ALL
7. TROUBLE CONCENTRATING ON THINGS, SUCH AS READING THE NEWSPAPER OR WATCHING TELEVISION: NOT AT ALL
6. FEELING BAD ABOUT YOURSELF - OR THAT YOU ARE A FAILURE OR HAVE LET YOURSELF OR YOUR FAMILY DOWN: NOT AT ALL
5. POOR APPETITE OR OVEREATING: NOT AT ALL
2. FEELING DOWN, DEPRESSED OR HOPELESS: NOT AT ALL
3. TROUBLE FALLING OR STAYING ASLEEP: NOT AT ALL
SUM OF ALL RESPONSES TO PHQ QUESTIONS 1-9: 0
8. MOVING OR SPEAKING SO SLOWLY THAT OTHER PEOPLE COULD HAVE NOTICED. OR THE OPPOSITE, BEING SO FIGETY OR RESTLESS THAT YOU HAVE BEEN MOVING AROUND A LOT MORE THAN USUAL: NOT AT ALL
SUM OF ALL RESPONSES TO PHQ QUESTIONS 1-9: 0
SUM OF ALL RESPONSES TO PHQ QUESTIONS 1-9: 0
1. LITTLE INTEREST OR PLEASURE IN DOING THINGS: NOT AT ALL
SUM OF ALL RESPONSES TO PHQ9 QUESTIONS 1 & 2: 0
4. FEELING TIRED OR HAVING LITTLE ENERGY: NOT AT ALL
10. IF YOU CHECKED OFF ANY PROBLEMS, HOW DIFFICULT HAVE THESE PROBLEMS MADE IT FOR YOU TO DO YOUR WORK, TAKE CARE OF THINGS AT HOME, OR GET ALONG WITH OTHER PEOPLE: NOT DIFFICULT AT ALL
SUM OF ALL RESPONSES TO PHQ QUESTIONS 1-9: 0

## 2025-01-14 NOTE — PROGRESS NOTES
GINA Nicolewillie Humphrey comes in for follow up care.  Gout: Patient has pain first left MTP joint.  This has been ongoing for days.  He has been doing supportive care.  The joint seems swollen.  There is tenderness to the touch and even with walking.  He has taken over-the-counter medication without much relief.  He has a previous history of gout and this feels the same.  This is likely gout arthritis.  I will send in indomethacin to take.  I will also send in colchicine to take.  We will check uric acid levels.  Abdominal pain: Patient has generalized abdominal pain.  He has also noted changes in his bowel habits.  Every time he eats the abdomen gets bloated, full and there is generalized cramping pain.  This is followed by loose stools.  He denies blood in the stool.  Occasionally has constipation.  Denies nausea or vomiting.  Denies fever or chills.  I will check labs.  I will also order CT scan abdomen pelvis.  I will refer him to the gastroenterologist.  May benefit from getting a colonoscopy done.    Dyslipidemia: Patient has dyslipidemia.  He is on atorvastatin 40 mg daily.  Would like a refill of medication.  Will recheck labs.  He will exercise and take a diet low in polysaturated fats.  Hypertension: Patient has hypertension.  Blood pressure is stable.  Denies headache, changes in vision or focal weakness.  Patient takes amlodipine 10 mg daily, losartan 100 mg daily, Cardura 1 mg daily and hydralazine 100 mg 3 times daily.  He will take a low-sodium diet.  Continue current treatment plan.  LUTS: Patient has lower urinary tract symptoms with straining on urination and poor urinary stream.  He is on doxazosin.  Will recheck labs.  Hyperglycemia: Will check HbA1c.  Mood disorder: Patient has mood disorder with anxiety and depression.  He is on sertraline 50 mg daily.  We will continue current treatment plan.  He will continue with supportive care.  Health maintenance: Patient will get the flu vaccine today.  We  Abnormal VS & WBC

## 2025-01-15 LAB
CHOLESTEROL, TOTAL: 201 MG/DL (ref 110–200)
CHOLESTEROL/HDL RATIO: 3.5 (ref 0–5)
HDLC SERPL-MCNC: 58 MG/DL
LDL CHOLESTEROL: 123 MG/DL (ref 50–99)
LDL/HDL RATIO: 2.1
NON-HDL CHOLESTEROL: 143 MG/DL
SCREENING PSA: 1.97 NG/ML
TRIGL SERPL-MCNC: 99 MG/DL (ref 40–149)
VLDLC SERPL CALC-MCNC: 20 MG/DL (ref 8–30)

## 2025-01-15 NOTE — PATIENT INSTRUCTIONS
and sodium.  Encourage the person you're caring for not to use tobacco products. They can affect dental and general health.  Many older adults have a fixed income and feel that they can't afford dental care. But most towns and cities have programs in which dentists help older adults by lowering fees. Contact your area's public health offices or  for information about dental care in your area.  Using a toothbrush  Older adults with arthritis sometimes have trouble brushing their teeth because they can't easily hold the toothbrush. Their hands and fingers may be stiff, painful, or weak. If this is the case, you can:  Offer an electric toothbrush.  Enlarge the handle of a non-electric toothbrush by wrapping a sponge, an elastic bandage, or adhesive tape around it.  Push the toothbrush handle through a ball made of rubber or soft foam.  Make the handle longer and thicker by taping Popsicle sticks or tongue depressors to it.  You may also be able to buy special toothbrushes, toothpaste dispensers, and floss holders.  Your doctor may recommend a soft-bristle toothbrush if the person you care for bleeds easily. Bleeding can happen because of a health problem or from certain medicines.  A toothpaste for sensitive teeth may help if the person you care for has sensitive teeth.  How do you brush and floss someone's teeth?  If the person you are caring for has a hard time cleaning their teeth on their own, you may need to brush and floss their teeth for them. It may be easiest to have the person sit and face away from you, and to sit or stand behind them. That way you can steady their head against your arm as you reach around to floss and brush their teeth. Choose a place that has good lighting and is comfortable for both of you.  Before you begin, gather your supplies. You will need gloves, floss, a toothbrush, and a container to hold water if you are not near a sink. Wash and dry your hands well and put on

## 2025-01-15 NOTE — PROGRESS NOTES
Medicare Annual Wellness Visit    Pedro Humphrey is here for Medicare AWV, Gout, and Abdominal Pain    Assessment & Plan    Diagnosis Orders   1. Medicare annual wellness visit, subsequent        2. Flu vaccine need  Influenza, FLUAD Trivalent, (age 65 y+), IM, Preservative Free, 0.5mL      3. Essential (primary) hypertension  amLODIPine (NORVASC) 10 MG tablet    losartan (COZAAR) 100 MG tablet    CBC with Auto Differential    Comprehensive Metabolic Panel    Lipid Panel      4. Hyperlipidemia, unspecified hyperlipidemia type  atorvastatin (LIPITOR) 40 MG tablet      5. Lower urinary tract symptoms (LUTS)  doxazosin (CARDURA) 1 MG tablet      6. Unspecified mood (affective) disorder (HCC)  sertraline (ZOLOFT) 50 MG tablet      7. Screening for malignant neoplasm of prostate  PSA Screening      8. Gout, unspecified cause, unspecified chronicity, unspecified site  Uric Acid    indomethacin (INDOCIN) 50 MG capsule    colchicine (COLCRYS) 0.6 MG tablet      9. Hyperglycemia, unspecified  Hemoglobin A1C      10. Generalized abdominal pain  Lipase    External Referral To Gastroenterology    CT ABDOMEN PELVIS W IV CONTRAST Additional Contrast? Radiologist Recommendation      11. Change in bowel habit  External Referral To Gastroenterology           Return in about 6 weeks (around 2/25/2025), or if symptoms worsen or fail to improve, for abdominal pain, change in bowel habit, gout.     Subjective   Pedro Humphrey comes in for Medicare wellness exam.    Patient's complete Health Risk Assessment and screening values have been reviewed and are found in Flowsheets. The following problems were reviewed today and where indicated follow up appointments were made and/or referrals ordered.    Positive Risk Factor Screenings with Interventions:              Inactivity:  On average, how many days per week do you engage in moderate to strenuous exercise (like a brisk walk)?: 0 days (!) Abnormal  On average, how many minutes do you

## 2025-01-21 DIAGNOSIS — D72.819 LEUKOPENIA, UNSPECIFIED TYPE: Primary | ICD-10-CM

## 2025-01-21 RX ORDER — ATORVASTATIN CALCIUM 80 MG/1
80 TABLET, FILM COATED ORAL DAILY
Qty: 30 TABLET | Refills: 3 | Status: SHIPPED | OUTPATIENT
Start: 2025-01-21

## 2025-02-03 ENCOUNTER — TELEPHONE (OUTPATIENT)
Facility: CLINIC | Age: 75
End: 2025-02-03

## 2025-02-03 DIAGNOSIS — R19.7 DIARRHEA, UNSPECIFIED TYPE: Primary | ICD-10-CM

## 2025-02-03 RX ORDER — DIPHENOXYLATE HYDROCHLORIDE AND ATROPINE SULFATE 2.5; .025 MG/1; MG/1
1 TABLET ORAL 4 TIMES DAILY PRN
Qty: 30 TABLET | Refills: 0 | Status: SHIPPED | OUTPATIENT
Start: 2025-02-03 | End: 2025-02-13

## 2025-02-03 NOTE — TELEPHONE ENCOUNTER
Ms Singh called Friday in regards to the previous visit with this patient. Mr Humphrey was contacted by Dr Worrell's nurse to discuss his results but the patient did not understand so Ms Singh was calling back to clarify the discussion. Mr Humphrey was advised to seek medical treatment at [facility] but was unclear as to why.     Advised Ms Singh that Dr Worrell and his nurse was not in the office today but would inform the nurse that's here to reach out.     Ms Singh called today and stated she was made aware that the patient's issue was diverticulitis.    Mr Humphrey need medication for this condition. Please contact Ms Singh at [phone] to inform that the message was delivered, today if possible. Since she was not called back on Friday 01/31

## 2025-02-03 NOTE — TELEPHONE ENCOUNTER
Caller called back wanting medication sent to pharmacy for Diarrhea=Imodium is not working.  Waleska Acuña.    GI referral number was given to call and schedule appt

## 2025-04-15 DIAGNOSIS — R19.7 DIARRHEA, UNSPECIFIED TYPE: ICD-10-CM

## 2025-04-15 RX ORDER — DIPHENOXYLATE HYDROCHLORIDE AND ATROPINE SULFATE 2.5; .025 MG/1; MG/1
TABLET ORAL
Qty: 30 TABLET | Refills: 0 | Status: SHIPPED | OUTPATIENT
Start: 2025-04-15 | End: 2025-05-15

## 2025-04-15 NOTE — TELEPHONE ENCOUNTER
Last seen 1/14/2025   Last labs 1/14/2025  Last filled  2/3/2025  Next appointment 1/14/2026     Lab Results   Component Value Date     01/14/2025    K 3.9 01/14/2025     01/14/2025    CO2 28 01/14/2025    BUN 11 01/14/2025    CREATININE 0.8 01/14/2025    GLUCOSE 95 01/14/2025    CALCIUM 9.4 01/14/2025    BILITOT 0.5 01/14/2025    ALKPHOS 102 01/14/2025    AST 25 01/14/2025    ALT 33 01/14/2025    LABGLOM >60.0 01/14/2025    GFRAA >60 10/06/2020    AGRATIO 1.3 01/14/2025    GLOB 3.4 01/14/2025

## 2025-06-05 DIAGNOSIS — R19.7 DIARRHEA, UNSPECIFIED TYPE: ICD-10-CM

## 2025-06-06 RX ORDER — DIPHENOXYLATE HYDROCHLORIDE AND ATROPINE SULFATE 2.5; .025 MG/1; MG/1
TABLET ORAL
Qty: 30 TABLET | Refills: 0 | Status: SHIPPED | OUTPATIENT
Start: 2025-06-06 | End: 2025-07-06

## 2025-06-06 NOTE — TELEPHONE ENCOUNTER
Last seen 1/14/2025   Last labs 1/14/2025  Last filled  4/15/2025  Next appointment 1/14/2026     Lab Results   Component Value Date     01/14/2025    K 3.9 01/14/2025     01/14/2025    CO2 28 01/14/2025    BUN 11 01/14/2025    CREATININE 0.8 01/14/2025    GLUCOSE 95 01/14/2025    CALCIUM 9.4 01/14/2025    BILITOT 0.5 01/14/2025    ALKPHOS 102 01/14/2025    AST 25 01/14/2025    ALT 33 01/14/2025    LABGLOM >60.0 01/14/2025    GFRAA >60 10/06/2020    AGRATIO 1.3 01/14/2025    GLOB 3.4 01/14/2025

## 2025-06-10 ENCOUNTER — TELEPHONE (OUTPATIENT)
Facility: CLINIC | Age: 75
End: 2025-06-10

## 2025-06-10 NOTE — TELEPHONE ENCOUNTER
----- Message from Mohan M sent at 6/10/2025 10:04 AM EDT -----  Regarding: [facility] Appointment Request  [facility] Appointment Request    Patient needs appointment for [facility] Appointment Type: Existing Condition Follow Up.    Patient Requested Dates(s): June 19  Patient Requested Time: 10 :00 am , 10 : 15 am   Provider Name:Guicho Worrell MD        Reason for Appointment Request: Established Patient - Available appointments did not meet patient need  --------------------------------------------------------------------------------------------------------------------------    Relationship to Patient: Self     Call Back Information: OK to leave message on voicemail  Preferred Call Back Number: Phone [phone]  
show

## 2025-06-19 DIAGNOSIS — J30.0 VASOMOTOR RHINITIS: ICD-10-CM

## 2025-06-19 DIAGNOSIS — M10.9 GOUT, UNSPECIFIED CAUSE, UNSPECIFIED CHRONICITY, UNSPECIFIED SITE: ICD-10-CM

## 2025-06-19 RX ORDER — SUMATRIPTAN 50 MG/1
TABLET, FILM COATED ORAL
Qty: 30 TABLET | Refills: 1 | Status: SHIPPED | OUTPATIENT
Start: 2025-06-19

## 2025-06-19 RX ORDER — CETIRIZINE HYDROCHLORIDE 10 MG/1
10 TABLET ORAL DAILY
Qty: 90 TABLET | Refills: 1 | Status: SHIPPED | OUTPATIENT
Start: 2025-06-19

## 2025-06-19 RX ORDER — COLCHICINE 0.6 MG/1
0.6 TABLET ORAL DAILY
Qty: 30 TABLET | Refills: 1 | Status: SHIPPED | OUTPATIENT
Start: 2025-06-19

## 2025-06-19 RX ORDER — IBUPROFEN 600 MG/1
600 TABLET, FILM COATED ORAL 3 TIMES DAILY PRN
Qty: 90 TABLET | Refills: 0 | Status: CANCELLED | OUTPATIENT
Start: 2025-06-19

## 2025-06-19 RX ORDER — BUTALBITAL, ACETAMINOPHEN AND CAFFEINE 300; 40; 50 MG/1; MG/1; MG/1
1 CAPSULE ORAL EVERY 6 HOURS PRN
Qty: 90 CAPSULE | Refills: 1 | Status: SHIPPED | OUTPATIENT
Start: 2025-06-19

## 2025-06-19 NOTE — TELEPHONE ENCOUNTER
Patients partner came in requesting refiolls on Certraline and both headache medications, along with Colchicine for gout.    Advised her that patient needs follow up appt scheduled but provider will review    Pharmacy is [facility]  Javed

## 2025-07-14 DIAGNOSIS — M10.9 GOUT, UNSPECIFIED CAUSE, UNSPECIFIED CHRONICITY, UNSPECIFIED SITE: ICD-10-CM

## 2025-07-14 RX ORDER — INDOMETHACIN 50 MG/1
50 CAPSULE ORAL 3 TIMES DAILY PRN
Qty: 60 CAPSULE | Refills: 0 | Status: SHIPPED | OUTPATIENT
Start: 2025-07-14

## 2025-07-14 NOTE — TELEPHONE ENCOUNTER
Patient was last seen on 1-    Last prescribed on 1-  #60 X 0    Indomethacin-colchicine was prescribed earlier

## 2025-07-14 NOTE — TELEPHONE ENCOUNTER
Ms Francisco walked into the office today to request that Pt need the capsule, pt got the pill for gout     Please be advised

## 2025-07-31 ENCOUNTER — OFFICE VISIT (OUTPATIENT)
Facility: CLINIC | Age: 75
End: 2025-07-31
Payer: MEDICARE

## 2025-07-31 VITALS
TEMPERATURE: 97.8 F | HEIGHT: 65 IN | OXYGEN SATURATION: 99 % | BODY MASS INDEX: 30.66 KG/M2 | SYSTOLIC BLOOD PRESSURE: 163 MMHG | DIASTOLIC BLOOD PRESSURE: 83 MMHG | RESPIRATION RATE: 20 BRPM | WEIGHT: 184 LBS | HEART RATE: 57 BPM

## 2025-07-31 DIAGNOSIS — R19.7 DIARRHEA, UNSPECIFIED TYPE: ICD-10-CM

## 2025-07-31 DIAGNOSIS — F39 UNSPECIFIED MOOD (AFFECTIVE) DISORDER: ICD-10-CM

## 2025-07-31 DIAGNOSIS — I10 ESSENTIAL (PRIMARY) HYPERTENSION: ICD-10-CM

## 2025-07-31 DIAGNOSIS — E78.5 HYPERLIPIDEMIA, UNSPECIFIED HYPERLIPIDEMIA TYPE: ICD-10-CM

## 2025-07-31 DIAGNOSIS — B35.3 TINEA PEDIS OF BOTH FEET: Primary | ICD-10-CM

## 2025-07-31 DIAGNOSIS — R39.9 LOWER URINARY TRACT SYMPTOMS (LUTS): ICD-10-CM

## 2025-07-31 DIAGNOSIS — M10.9 GOUT, UNSPECIFIED CAUSE, UNSPECIFIED CHRONICITY, UNSPECIFIED SITE: ICD-10-CM

## 2025-07-31 PROCEDURE — 1160F RVW MEDS BY RX/DR IN RCRD: CPT | Performed by: FAMILY MEDICINE

## 2025-07-31 PROCEDURE — 3079F DIAST BP 80-89 MM HG: CPT | Performed by: FAMILY MEDICINE

## 2025-07-31 PROCEDURE — 3077F SYST BP >= 140 MM HG: CPT | Performed by: FAMILY MEDICINE

## 2025-07-31 PROCEDURE — 1159F MED LIST DOCD IN RCRD: CPT | Performed by: FAMILY MEDICINE

## 2025-07-31 PROCEDURE — 99215 OFFICE O/P EST HI 40 MIN: CPT | Performed by: FAMILY MEDICINE

## 2025-07-31 PROCEDURE — 1123F ACP DISCUSS/DSCN MKR DOCD: CPT | Performed by: FAMILY MEDICINE

## 2025-07-31 RX ORDER — CLOTRIMAZOLE 1 %
CREAM (GRAM) TOPICAL
Qty: 113 G | Refills: 1 | Status: SHIPPED | OUTPATIENT
Start: 2025-07-31 | End: 2025-08-07

## 2025-07-31 RX ORDER — INDOMETHACIN 50 MG/1
50 CAPSULE ORAL 3 TIMES DAILY PRN
Qty: 60 CAPSULE | Refills: 0 | Status: SHIPPED | OUTPATIENT
Start: 2025-07-31

## 2025-07-31 RX ORDER — DIPHENOXYLATE HYDROCHLORIDE AND ATROPINE SULFATE 2.5; .025 MG/1; MG/1
TABLET ORAL
Qty: 30 TABLET | Refills: 0 | Status: SHIPPED | OUTPATIENT
Start: 2025-07-31 | End: 2025-08-09

## 2025-07-31 RX ORDER — SERTRALINE HYDROCHLORIDE 100 MG/1
100 TABLET, FILM COATED ORAL DAILY
Qty: 90 TABLET | Refills: 1 | Status: SHIPPED | OUTPATIENT
Start: 2025-07-31

## 2025-07-31 NOTE — PROGRESS NOTES
GINA Nicolewillie Humphrey comes in for follow up care.  HTN: Patient has hypertension.  Blood pressure is elevated.  He denies headache, changes in vision or focal weakness.  He has not taken his medication this morning he states.  He is on amlodipine 10 mg daily, losartan 100 mg daily and hydralazine 100 mg 3 times a day.  States that when he takes his medication his blood pressure comes down.  He will take a low-sodium diet.  He will take his blood pressure medication as prescribed.  He will keep a blood pressure log and we will follow-up at next visit.  Athletes foot: Patient has foot pain and occasional swelling.  Pain is mainly in the interdigital areas of both feet.  He has athlete's foot bilaterally.  He has onychomycosis and overgrown toenails.  I will send in Lotrimin to apply for the athlete's foot.  I will refer him to the podiatrist for nail care.  Diarrhea: Patient has diarrhea that comes on and off.  He has been referred to see the gastroenterologist.  He denies blood in stool.  He takes Lomotil and this has helped with his symptoms.  I did emphasize the need to see a gastroenterologist for possible colonoscopy.  In the meantime I will send a refill of the low mid to.  Patient will call to schedule an appointment with the gastroenterologist.  Headache: Patient has a history of chronic headaches.  He has been on Imitrex and occasionally has taken Fioricet for the headaches.  Currently stable.  Continue current management plan.  Dyslipidemia: Patient has dyslipidemia.  He is on atorvastatin 80 mg daily.  Stable on medication.  He will exercise and take a diet low in polysaturated fats.  LUTS: Patient has lower urinary tract symptoms.  He has straining on urination and poor urinary stream.  He is on Cardura.  Continue current treatment plan.  Gout: Patient has gout arthritis.  He gets gout flareups on and off.  He is taking a purine restricted diet.  He is on colchicine.  He has used indomethacin for acute

## 2025-08-05 ENCOUNTER — TELEPHONE (OUTPATIENT)
Facility: CLINIC | Age: 75
End: 2025-08-05

## 2025-08-06 RX ORDER — KETOCONAZOLE 20 MG/G
CREAM TOPICAL
Qty: 30 G | Refills: 1 | Status: SHIPPED | OUTPATIENT
Start: 2025-08-06

## 2025-08-13 RX ORDER — ATORVASTATIN CALCIUM 80 MG/1
80 TABLET, FILM COATED ORAL DAILY
Qty: 90 TABLET | Refills: 1 | Status: SHIPPED | OUTPATIENT
Start: 2025-08-13

## (undated) DEVICE — TUBING, SUCTION, 9/32" X 10', STRAIGHT: Brand: MEDLINE

## (undated) DEVICE — SOLUTION IRRIG 500ML STRL H2O NONPYROGENIC

## (undated) DEVICE — AIRLIFE™ NASAL OXYGEN CANNULA CURVED, NONFLARED TIP WITH 14 FOOT (4.3 M) CRUSH-RESISTANT TUBING, OVER-THE-EAR STYLE: Brand: AIRLIFE™

## (undated) DEVICE — TUBING INSUFFLATION CAP W/ EXT CARBON DIOX ENDO SMARTCAP

## (undated) DEVICE — FORCEPS BX L240CM JAW DIA2.8MM L CAP W/ NDL MIC MESH TOOTH

## (undated) DEVICE — SOLUTION IRRIG 1000ML STRL H2O USP PLAS POUR BTL

## (undated) DEVICE — REM POLYHESIVE ADULT PATIENT RETURN ELECTRODE: Brand: VALLEYLAB

## (undated) DEVICE — KIT COLON W/ 1.1OZ LUB AND 2 END

## (undated) DEVICE — MEDI-VAC NON-CONDUCTIVE SUCTION TUBING: Brand: CARDINAL HEALTH

## (undated) DEVICE — GLOVE SURG SZ 65 L12IN FNGR THK79MIL GRN LTX FREE

## (undated) DEVICE — Device: Brand: DEFENDO VALVE AND CONNECTOR KIT

## (undated) DEVICE — CATH IV SAFE STR 22GX1IN BLU -- PROTECTIV PLUS

## (undated) DEVICE — ENDOGATOR TUBING FOR BOSTON SCIENTIFIC ENDOSTAT II PUMP, OLYMPUS OFP PUMP OR ENDO STRATUS PUMP: Brand: ENDOGATOR

## (undated) DEVICE — FLEX ADVANTAGE 1500CC: Brand: FLEX ADVANTAGE

## (undated) DEVICE — Device